# Patient Record
Sex: FEMALE | Race: BLACK OR AFRICAN AMERICAN | NOT HISPANIC OR LATINO | URBAN - METROPOLITAN AREA
[De-identification: names, ages, dates, MRNs, and addresses within clinical notes are randomized per-mention and may not be internally consistent; named-entity substitution may affect disease eponyms.]

---

## 2017-01-01 ENCOUNTER — INPATIENT (INPATIENT)
Facility: HOSPITAL | Age: 72
LOS: 2 days | Discharge: NURSING, REHAB OR LONG-TERM CARE | End: 2017-01-04
Attending: INTERNAL MEDICINE | Admitting: INTERNAL MEDICINE
Payer: MEDICARE

## 2017-01-01 VITALS
RESPIRATION RATE: 20 BRPM | HEART RATE: 88 BPM | DIASTOLIC BLOOD PRESSURE: 91 MMHG | TEMPERATURE: 99 F | SYSTOLIC BLOOD PRESSURE: 156 MMHG | OXYGEN SATURATION: 99 %

## 2017-01-01 DIAGNOSIS — E11.9 TYPE 2 DIABETES MELLITUS WITHOUT COMPLICATIONS: ICD-10-CM

## 2017-01-01 DIAGNOSIS — Z96.7 PRESENCE OF OTHER BONE AND TENDON IMPLANTS: Chronic | ICD-10-CM

## 2017-01-01 DIAGNOSIS — Z29.9 ENCOUNTER FOR PROPHYLACTIC MEASURES, UNSPECIFIED: ICD-10-CM

## 2017-01-01 DIAGNOSIS — S32.599A OTHER SPECIFIED FRACTURE OF UNSPECIFIED PUBIS, INITIAL ENCOUNTER FOR CLOSED FRACTURE: ICD-10-CM

## 2017-01-01 DIAGNOSIS — Z98.89 OTHER SPECIFIED POSTPROCEDURAL STATES: Chronic | ICD-10-CM

## 2017-01-01 DIAGNOSIS — Z98.51 TUBAL LIGATION STATUS: Chronic | ICD-10-CM

## 2017-01-01 DIAGNOSIS — I10 ESSENTIAL (PRIMARY) HYPERTENSION: ICD-10-CM

## 2017-01-01 DIAGNOSIS — Z87.81 PERSONAL HISTORY OF (HEALED) TRAUMATIC FRACTURE: Chronic | ICD-10-CM
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
## 2017-01-01 DIAGNOSIS — E78.5 HYPERLIPIDEMIA, UNSPECIFIED: ICD-10-CM

## 2017-01-01 DIAGNOSIS — N60.02 SOLITARY CYST OF LEFT BREAST: Chronic | ICD-10-CM

## 2017-01-01 LAB
ALBUMIN SERPL ELPH-MCNC: 3.8 G/DL — SIGNIFICANT CHANGE UP (ref 3.3–5)
ALP SERPL-CCNC: 93 U/L — SIGNIFICANT CHANGE UP (ref 40–120)
ALT FLD-CCNC: 18 U/L — SIGNIFICANT CHANGE UP (ref 4–33)
ANISOCYTOSIS BLD QL: SLIGHT — SIGNIFICANT CHANGE UP
AST SERPL-CCNC: 28 U/L — SIGNIFICANT CHANGE UP (ref 4–32)
BASOPHILS # BLD AUTO: 0.01 K/UL — SIGNIFICANT CHANGE UP (ref 0–0.2)
BASOPHILS NFR BLD AUTO: 0.2 % — SIGNIFICANT CHANGE UP (ref 0–2)
BILIRUB SERPL-MCNC: 0.4 MG/DL — SIGNIFICANT CHANGE UP (ref 0.2–1.2)
BUN SERPL-MCNC: 15 MG/DL — SIGNIFICANT CHANGE UP (ref 7–23)
CALCIUM SERPL-MCNC: 9.1 MG/DL — SIGNIFICANT CHANGE UP (ref 8.4–10.5)
CHLORIDE SERPL-SCNC: 109 MMOL/L — HIGH (ref 98–107)
CO2 SERPL-SCNC: 22 MMOL/L — SIGNIFICANT CHANGE UP (ref 22–31)
CREAT SERPL-MCNC: 0.88 MG/DL — SIGNIFICANT CHANGE UP (ref 0.5–1.3)
DACRYOCYTES BLD QL SMEAR: SLIGHT — SIGNIFICANT CHANGE UP
EOSINOPHIL # BLD AUTO: 0.07 K/UL — SIGNIFICANT CHANGE UP (ref 0–0.5)
EOSINOPHIL NFR BLD AUTO: 1.3 % — SIGNIFICANT CHANGE UP (ref 0–6)
GLUCOSE SERPL-MCNC: 134 MG/DL — HIGH (ref 70–99)
HCT VFR BLD CALC: 32.4 % — LOW (ref 34.5–45)
HGB BLD-MCNC: 10.5 G/DL — LOW (ref 11.5–15.5)
HYPOCHROMIA BLD QL: SLIGHT — SIGNIFICANT CHANGE UP
IMM GRANULOCYTES NFR BLD AUTO: 0 % — SIGNIFICANT CHANGE UP (ref 0–1.5)
LYMPHOCYTES # BLD AUTO: 1.65 K/UL — SIGNIFICANT CHANGE UP (ref 1–3.3)
LYMPHOCYTES # BLD AUTO: 31 % — SIGNIFICANT CHANGE UP (ref 13–44)
MANUAL SMEAR VERIFICATION: SIGNIFICANT CHANGE UP
MCHC RBC-ENTMCNC: 23.5 PG — LOW (ref 27–34)
MCHC RBC-ENTMCNC: 32.4 % — SIGNIFICANT CHANGE UP (ref 32–36)
MCV RBC AUTO: 72.6 FL — LOW (ref 80–100)
MICROCYTES BLD QL: SLIGHT — SIGNIFICANT CHANGE UP
MONOCYTES # BLD AUTO: 0.22 K/UL — SIGNIFICANT CHANGE UP (ref 0–0.9)
MONOCYTES NFR BLD AUTO: 4.1 % — SIGNIFICANT CHANGE UP (ref 2–14)
NEUTROPHILS # BLD AUTO: 3.38 K/UL — SIGNIFICANT CHANGE UP (ref 1.8–7.4)
NEUTROPHILS NFR BLD AUTO: 63.4 % — SIGNIFICANT CHANGE UP (ref 43–77)
PLATELET # BLD AUTO: 164 K/UL — SIGNIFICANT CHANGE UP (ref 150–400)
PLATELET COUNT - ESTIMATE: NORMAL — SIGNIFICANT CHANGE UP
PMV BLD: 8.5 FL — SIGNIFICANT CHANGE UP (ref 7–13)
POLYCHROMASIA BLD QL SMEAR: SLIGHT — SIGNIFICANT CHANGE UP
POTASSIUM SERPL-MCNC: 3.6 MMOL/L — SIGNIFICANT CHANGE UP (ref 3.5–5.3)
POTASSIUM SERPL-SCNC: 3.6 MMOL/L — SIGNIFICANT CHANGE UP (ref 3.5–5.3)
PROT SERPL-MCNC: 6.9 G/DL — SIGNIFICANT CHANGE UP (ref 6–8.3)
RBC # BLD: 4.46 M/UL — SIGNIFICANT CHANGE UP (ref 3.8–5.2)
RBC # FLD: 15.3 % — HIGH (ref 10.3–14.5)
SODIUM SERPL-SCNC: 144 MMOL/L — SIGNIFICANT CHANGE UP (ref 135–145)
WBC # BLD: 5.33 K/UL — SIGNIFICANT CHANGE UP (ref 3.8–10.5)
WBC # FLD AUTO: 5.33 K/UL — SIGNIFICANT CHANGE UP (ref 3.8–10.5)

## 2017-01-01 PROCEDURE — 99223 1ST HOSP IP/OBS HIGH 75: CPT

## 2017-01-01 PROCEDURE — 72192 CT PELVIS W/O DYE: CPT | Mod: 26

## 2017-01-01 PROCEDURE — 72020 X-RAY EXAM OF SPINE 1 VIEW: CPT | Mod: 26

## 2017-01-01 PROCEDURE — 71020: CPT | Mod: 26

## 2017-01-01 PROCEDURE — 72170 X-RAY EXAM OF PELVIS: CPT | Mod: 26

## 2017-01-01 RX ORDER — DEXTROSE 50 % IN WATER 50 %
12.5 SYRINGE (ML) INTRAVENOUS ONCE
Qty: 0 | Refills: 0 | Status: DISCONTINUED | OUTPATIENT
Start: 2017-01-01 | End: 2017-01-04

## 2017-01-01 RX ORDER — ATORVASTATIN CALCIUM 80 MG/1
80 TABLET, FILM COATED ORAL AT BEDTIME
Qty: 0 | Refills: 0 | Status: DISCONTINUED | OUTPATIENT
Start: 2017-01-01 | End: 2017-01-04

## 2017-01-01 RX ORDER — INSULIN GLARGINE 100 [IU]/ML
15 INJECTION, SOLUTION SUBCUTANEOUS AT BEDTIME
Qty: 0 | Refills: 0 | Status: DISCONTINUED | OUTPATIENT
Start: 2017-01-01 | End: 2017-01-04

## 2017-01-01 RX ORDER — INSULIN GLARGINE 100 [IU]/ML
20 INJECTION, SOLUTION SUBCUTANEOUS AT BEDTIME
Qty: 0 | Refills: 0 | Status: DISCONTINUED | OUTPATIENT
Start: 2017-01-01 | End: 2017-01-01

## 2017-01-01 RX ORDER — LIDOCAINE 4 G/100G
1 CREAM TOPICAL DAILY
Qty: 0 | Refills: 0 | Status: DISCONTINUED | OUTPATIENT
Start: 2017-01-01 | End: 2017-01-04

## 2017-01-01 RX ORDER — DEXTROSE 50 % IN WATER 50 %
25 SYRINGE (ML) INTRAVENOUS ONCE
Qty: 0 | Refills: 0 | Status: DISCONTINUED | OUTPATIENT
Start: 2017-01-01 | End: 2017-01-04

## 2017-01-01 RX ORDER — GLUCAGON INJECTION, SOLUTION 0.5 MG/.1ML
1 INJECTION, SOLUTION SUBCUTANEOUS ONCE
Qty: 0 | Refills: 0 | Status: DISCONTINUED | OUTPATIENT
Start: 2017-01-01 | End: 2017-01-04

## 2017-01-01 RX ORDER — ENOXAPARIN SODIUM 100 MG/ML
40 INJECTION SUBCUTANEOUS EVERY 24 HOURS
Qty: 0 | Refills: 0 | Status: DISCONTINUED | OUTPATIENT
Start: 2017-01-01 | End: 2017-01-04

## 2017-01-01 RX ORDER — ACETAMINOPHEN 500 MG
650 TABLET ORAL EVERY 6 HOURS
Qty: 0 | Refills: 0 | Status: DISCONTINUED | OUTPATIENT
Start: 2017-01-01 | End: 2017-01-04

## 2017-01-01 RX ORDER — ACETAMINOPHEN 500 MG
650 TABLET ORAL EVERY 6 HOURS
Qty: 0 | Refills: 0 | Status: DISCONTINUED | OUTPATIENT
Start: 2017-01-01 | End: 2017-01-01

## 2017-01-01 RX ORDER — INSULIN LISPRO 100/ML
VIAL (ML) SUBCUTANEOUS
Qty: 0 | Refills: 0 | Status: DISCONTINUED | OUTPATIENT
Start: 2017-01-01 | End: 2017-01-04

## 2017-01-01 RX ORDER — INSULIN LISPRO 100/ML
VIAL (ML) SUBCUTANEOUS AT BEDTIME
Qty: 0 | Refills: 0 | Status: DISCONTINUED | OUTPATIENT
Start: 2017-01-01 | End: 2017-01-04

## 2017-01-01 RX ORDER — VALSARTAN 80 MG/1
160 TABLET ORAL DAILY
Qty: 0 | Refills: 0 | Status: DISCONTINUED | OUTPATIENT
Start: 2017-01-01 | End: 2017-01-04

## 2017-01-01 RX ORDER — OXYCODONE HYDROCHLORIDE 5 MG/1
5 TABLET ORAL EVERY 4 HOURS
Qty: 0 | Refills: 0 | Status: DISCONTINUED | OUTPATIENT
Start: 2017-01-01 | End: 2017-01-04

## 2017-01-01 RX ORDER — METOPROLOL TARTRATE 50 MG
50 TABLET ORAL
Qty: 0 | Refills: 0 | Status: DISCONTINUED | OUTPATIENT
Start: 2017-01-01 | End: 2017-01-04

## 2017-01-01 RX ORDER — IBUPROFEN 200 MG
400 TABLET ORAL ONCE
Qty: 0 | Refills: 0 | Status: COMPLETED | OUTPATIENT
Start: 2017-01-01 | End: 2017-01-01

## 2017-01-01 RX ORDER — OXYCODONE HYDROCHLORIDE 5 MG/1
5 TABLET ORAL ONCE
Qty: 0 | Refills: 0 | Status: DISCONTINUED | OUTPATIENT
Start: 2017-01-01 | End: 2017-01-01

## 2017-01-01 RX ORDER — ACETAMINOPHEN 500 MG
650 TABLET ORAL EVERY 6 HOURS
Qty: 0 | Refills: 0 | Status: COMPLETED | OUTPATIENT
Start: 2017-01-01 | End: 2017-01-02

## 2017-01-01 RX ORDER — SODIUM CHLORIDE 9 MG/ML
1000 INJECTION, SOLUTION INTRAVENOUS
Qty: 0 | Refills: 0 | Status: DISCONTINUED | OUTPATIENT
Start: 2017-01-01 | End: 2017-01-04

## 2017-01-01 RX ORDER — INSULIN LISPRO 100/ML
5 VIAL (ML) SUBCUTANEOUS
Qty: 0 | Refills: 0 | Status: DISCONTINUED | OUTPATIENT
Start: 2017-01-01 | End: 2017-01-04

## 2017-01-01 RX ORDER — HYDROMORPHONE HYDROCHLORIDE 2 MG/ML
0.5 INJECTION INTRAMUSCULAR; INTRAVENOUS; SUBCUTANEOUS ONCE
Qty: 0 | Refills: 0 | Status: DISCONTINUED | OUTPATIENT
Start: 2017-01-01 | End: 2017-01-01

## 2017-01-01 RX ORDER — DEXTROSE 50 % IN WATER 50 %
1 SYRINGE (ML) INTRAVENOUS ONCE
Qty: 0 | Refills: 0 | Status: DISCONTINUED | OUTPATIENT
Start: 2017-01-01 | End: 2017-01-04

## 2017-01-01 RX ADMIN — VALSARTAN 160 MILLIGRAM(S): 80 TABLET ORAL at 14:15

## 2017-01-01 RX ADMIN — Medication 5 UNIT(S): at 17:48

## 2017-01-01 RX ADMIN — ATORVASTATIN CALCIUM 80 MILLIGRAM(S): 80 TABLET, FILM COATED ORAL at 22:44

## 2017-01-01 RX ADMIN — OXYCODONE HYDROCHLORIDE 5 MILLIGRAM(S): 5 TABLET ORAL at 22:55

## 2017-01-01 RX ADMIN — LIDOCAINE 1 PATCH: 4 CREAM TOPICAL at 17:51

## 2017-01-01 RX ADMIN — OXYCODONE HYDROCHLORIDE 5 MILLIGRAM(S): 5 TABLET ORAL at 21:51

## 2017-01-01 RX ADMIN — OXYCODONE HYDROCHLORIDE 5 MILLIGRAM(S): 5 TABLET ORAL at 10:32

## 2017-01-01 RX ADMIN — OXYCODONE HYDROCHLORIDE 5 MILLIGRAM(S): 5 TABLET ORAL at 14:50

## 2017-01-01 RX ADMIN — Medication 650 MILLIGRAM(S): at 18:21

## 2017-01-01 RX ADMIN — Medication 650 MILLIGRAM(S): at 17:51

## 2017-01-01 RX ADMIN — ENOXAPARIN SODIUM 40 MILLIGRAM(S): 100 INJECTION SUBCUTANEOUS at 14:15

## 2017-01-01 RX ADMIN — HYDROMORPHONE HYDROCHLORIDE 0.5 MILLIGRAM(S): 2 INJECTION INTRAMUSCULAR; INTRAVENOUS; SUBCUTANEOUS at 17:47

## 2017-01-01 RX ADMIN — OXYCODONE HYDROCHLORIDE 5 MILLIGRAM(S): 5 TABLET ORAL at 09:57

## 2017-01-01 RX ADMIN — Medication 400 MILLIGRAM(S): at 03:31

## 2017-01-01 RX ADMIN — OXYCODONE HYDROCHLORIDE 5 MILLIGRAM(S): 5 TABLET ORAL at 14:16

## 2017-01-01 RX ADMIN — HYDROMORPHONE HYDROCHLORIDE 0.5 MILLIGRAM(S): 2 INJECTION INTRAMUSCULAR; INTRAVENOUS; SUBCUTANEOUS at 18:07

## 2017-01-01 RX ADMIN — Medication 400 MILLIGRAM(S): at 04:00

## 2017-01-01 RX ADMIN — INSULIN GLARGINE 15 UNIT(S): 100 INJECTION, SOLUTION SUBCUTANEOUS at 22:43

## 2017-01-01 RX ADMIN — Medication 50 MILLIGRAM(S): at 17:51

## 2017-01-01 NOTE — ED PROVIDER NOTE - PMH
Anemia    Diabetes mellitus type II    HTN (hypertension), benign    Hypercholesterolemia    Radius fracture  left

## 2017-01-01 NOTE — ED PROVIDER NOTE - ATTENDING CONTRIBUTION TO CARE
Dr. Thomas:  I have personally performed a face to face bedside history and physical examination of this patient. I have discussed the history, examination, review of systems, assessment and plan of management with the resident. I have reviewed the electronic medical record and amended it to reflect my history, review of systems, physical exam, assessment and plan. Dr. Thomas:  I have personally performed a face to face bedside history and physical examination of this patient. I have discussed the history, examination, review of systems, assessment and plan of management with the resident. I have reviewed the electronic medical record and amended it to reflect my history, review of systems, physical exam, assessment and plan.    71F h/o HTN, HLD, DM, presents s/p mechanical fall with bilateral shoulder and right groin pain.  Pt states she was attempting to get ready to go to [employer] and slipped and fell in the bathroom.  Denies head trauma, syncope, other complaints.    Exam:  - NAD  - RRR  - CTAB  - +mild TTP thoracic spine  - pain with ranging lower extremities at the hip, stable pelvis    A/P  - mechanical fall  - CXR, pelvic XR, spine XR  - pain control  - reassess

## 2017-01-01 NOTE — ED ADULT NURSE NOTE - OBJECTIVE STATEMENT
Pt received to rm 20 aaox4 reports she can't walk d/t pain c/o mechanical trip and fall. Pt denies head trauma or LOC.  Pt c/o right groin pain and bilateral shoulder pain.  Pt denies numbness tingling.  Pt exhibits full ROM and sensory in all 4 extremities.  Pt in NAD awaiting further evaluation.

## 2017-01-01 NOTE — H&P ADULT. - PROBLEM SELECTOR PLAN 1
-non-operative per Ortho, WBAT  -f/u official Ortho note  -PT eval  -pain control w/ Tylenol, oxycodone PRN

## 2017-01-01 NOTE — H&P ADULT. - HISTORY OF PRESENT ILLNESS
70 yo F w/ anemia, HTN, HLD, IDDM presents s/p fall. Patient was getting dressed for [employer] this morning when she saw some water on the floor. She went to the bathroom to get a towel and thinks she slipped on the water. Patient fell and landed on her L hip. She denies hitting her head and did not have any LOC. Shortly thereafter, patient began experiencing R groin pain and difficulty ambulating and presented to the ED.    In the ED, patient was afebrile, VSS.  Labs unremarkable.   CT pelvis revealed an acute non-displaced R superior pubic ramus fracture.   Ortho was consulted and verbalized recs of WBAT, no surgical intervention. Official consult pending.

## 2017-01-01 NOTE — H&P ADULT. - ASSESSMENT
72 yo F w/ anemia, HTN, HLD, IDDM presents s/p fall with R groin pain found to have acute nondisplaced R superior pubic ramus fracture

## 2017-01-01 NOTE — H&P ADULT. - PROBLEM SELECTOR PLAN 2
-pt takes Lantus 40 qhs at home, but serum glucose in low 100's, will order 20 units Lantus tonight, monitor FS throughout the day  -ISS  -check Hga1c in AM

## 2017-01-01 NOTE — ED ADULT NURSE NOTE - CHIEF COMPLAINT QUOTE
Pt. brought to [facility] ED via EMS. Pt. tripped and fell while going to the bathroom. Denies head trauma. c/o right groin pain and bilateral shoulder pain. NAD noted.

## 2017-01-01 NOTE — ED ADULT TRIAGE NOTE - CHIEF COMPLAINT QUOTE
Pt. brought to [facility] ED via EMS. Pt. tripped and fell while going to the bathroom. Denies head trauma. c/o right groin pain and bilateral shoulder pain. NAD noted.

## 2017-01-01 NOTE — ED PROVIDER NOTE - PSH
Breast cyst, left  Excision of left breast cyst; 1964  H/O tubal ligation  1983  History of bunionectomy of left great toe    History of cataract extraction  bilateral;  (Left 02/013 - right 03/3013)  History of wrist fracture  ORIF left wrist ; 2013  S/P ORIF (open reduction internal fixation) fracture  left ankle; ~ 5-6 years ago

## 2017-01-01 NOTE — ED PROVIDER NOTE - OBJECTIVE STATEMENT
70yo f pmh htn, hld, dm, presents s/p fall. Pt tripped while getting dressed for [employer], pt now complaining for right groin pain, difficulty ambulating. Pt has some bilateral shoulder pain as well. Pt did not hit head, no LOC. Not on AC.

## 2017-01-01 NOTE — PROVIDER CONTACT NOTE (OTHER) - ACTION/TREATMENT ORDERED:
Dr. العلي notified and ordered 15U Lantus to be administered, hold Humalog at this time. Will continue to monitor and assess.

## 2017-01-02 ENCOUNTER — TRANSCRIPTION ENCOUNTER (OUTPATIENT)
Age: 72
End: 2017-01-02

## 2017-01-02 LAB
ALBUMIN SERPL ELPH-MCNC: 3.5 G/DL — SIGNIFICANT CHANGE UP (ref 3.3–5)
ALP SERPL-CCNC: 88 U/L — SIGNIFICANT CHANGE UP (ref 40–120)
ALT FLD-CCNC: 16 U/L — SIGNIFICANT CHANGE UP (ref 4–33)
AST SERPL-CCNC: 23 U/L — SIGNIFICANT CHANGE UP (ref 4–32)
BASOPHILS # BLD AUTO: 0.02 K/UL — SIGNIFICANT CHANGE UP (ref 0–0.2)
BASOPHILS NFR BLD AUTO: 0.4 % — SIGNIFICANT CHANGE UP (ref 0–2)
BILIRUB SERPL-MCNC: 0.4 MG/DL — SIGNIFICANT CHANGE UP (ref 0.2–1.2)
BUN SERPL-MCNC: 13 MG/DL — SIGNIFICANT CHANGE UP (ref 7–23)
BUN SERPL-MCNC: 13 MG/DL — SIGNIFICANT CHANGE UP (ref 7–23)
CALCIUM SERPL-MCNC: 8.8 MG/DL — SIGNIFICANT CHANGE UP (ref 8.4–10.5)
CALCIUM SERPL-MCNC: 8.8 MG/DL — SIGNIFICANT CHANGE UP (ref 8.4–10.5)
CHLORIDE SERPL-SCNC: 104 MMOL/L — SIGNIFICANT CHANGE UP (ref 98–107)
CHLORIDE SERPL-SCNC: 104 MMOL/L — SIGNIFICANT CHANGE UP (ref 98–107)
CO2 SERPL-SCNC: 21 MMOL/L — LOW (ref 22–31)
CO2 SERPL-SCNC: 21 MMOL/L — LOW (ref 22–31)
CREAT SERPL-MCNC: 0.92 MG/DL — SIGNIFICANT CHANGE UP (ref 0.5–1.3)
CREAT SERPL-MCNC: 0.92 MG/DL — SIGNIFICANT CHANGE UP (ref 0.5–1.3)
EOSINOPHIL # BLD AUTO: 0.18 K/UL — SIGNIFICANT CHANGE UP (ref 0–0.5)
EOSINOPHIL NFR BLD AUTO: 3.8 % — SIGNIFICANT CHANGE UP (ref 0–6)
GLUCOSE SERPL-MCNC: 150 MG/DL — HIGH (ref 70–99)
GLUCOSE SERPL-MCNC: 150 MG/DL — HIGH (ref 70–99)
HBA1C BLD-MCNC: 7 % — HIGH (ref 4–5.6)
HCT VFR BLD CALC: 33.7 % — LOW (ref 34.5–45)
HGB BLD-MCNC: 10.6 G/DL — LOW (ref 11.5–15.5)
IMM GRANULOCYTES NFR BLD AUTO: 0.2 % — SIGNIFICANT CHANGE UP (ref 0–1.5)
LYMPHOCYTES # BLD AUTO: 1.39 K/UL — SIGNIFICANT CHANGE UP (ref 1–3.3)
LYMPHOCYTES # BLD AUTO: 29 % — SIGNIFICANT CHANGE UP (ref 13–44)
MAGNESIUM SERPL-MCNC: 1.8 MG/DL — SIGNIFICANT CHANGE UP (ref 1.6–2.6)
MCHC RBC-ENTMCNC: 23.5 PG — LOW (ref 27–34)
MCHC RBC-ENTMCNC: 31.5 % — LOW (ref 32–36)
MCV RBC AUTO: 74.7 FL — LOW (ref 80–100)
MONOCYTES # BLD AUTO: 0.15 K/UL — SIGNIFICANT CHANGE UP (ref 0–0.9)
MONOCYTES NFR BLD AUTO: 3.1 % — SIGNIFICANT CHANGE UP (ref 2–14)
NEUTROPHILS # BLD AUTO: 3.05 K/UL — SIGNIFICANT CHANGE UP (ref 1.8–7.4)
NEUTROPHILS NFR BLD AUTO: 63.5 % — SIGNIFICANT CHANGE UP (ref 43–77)
PHOSPHATE SERPL-MCNC: 2.3 MG/DL — LOW (ref 2.5–4.5)
PLATELET # BLD AUTO: 159 K/UL — SIGNIFICANT CHANGE UP (ref 150–400)
PMV BLD: 9 FL — SIGNIFICANT CHANGE UP (ref 7–13)
POTASSIUM SERPL-MCNC: 3.7 MMOL/L — SIGNIFICANT CHANGE UP (ref 3.5–5.3)
POTASSIUM SERPL-MCNC: 3.7 MMOL/L — SIGNIFICANT CHANGE UP (ref 3.5–5.3)
POTASSIUM SERPL-SCNC: 3.7 MMOL/L — SIGNIFICANT CHANGE UP (ref 3.5–5.3)
POTASSIUM SERPL-SCNC: 3.7 MMOL/L — SIGNIFICANT CHANGE UP (ref 3.5–5.3)
PROT SERPL-MCNC: 6.5 G/DL — SIGNIFICANT CHANGE UP (ref 6–8.3)
RBC # BLD: 4.51 M/UL — SIGNIFICANT CHANGE UP (ref 3.8–5.2)
RBC # FLD: 15.3 % — HIGH (ref 10.3–14.5)
SODIUM SERPL-SCNC: 137 MMOL/L — SIGNIFICANT CHANGE UP (ref 135–145)
SODIUM SERPL-SCNC: 137 MMOL/L — SIGNIFICANT CHANGE UP (ref 135–145)
WBC # BLD: 4.8 K/UL — SIGNIFICANT CHANGE UP (ref 3.8–10.5)
WBC # FLD AUTO: 4.8 K/UL — SIGNIFICANT CHANGE UP (ref 3.8–10.5)

## 2017-01-02 RX ORDER — SENNA PLUS 8.6 MG/1
2 TABLET ORAL AT BEDTIME
Qty: 0 | Refills: 0 | Status: DISCONTINUED | OUTPATIENT
Start: 2017-01-02 | End: 2017-01-04

## 2017-01-02 RX ORDER — CALCIUM CARBONATE 500(1250)
2 TABLET ORAL ONCE
Qty: 0 | Refills: 0 | Status: COMPLETED | OUTPATIENT
Start: 2017-01-02 | End: 2017-01-02

## 2017-01-02 RX ORDER — DOCUSATE SODIUM 100 MG
100 CAPSULE ORAL THREE TIMES A DAY
Qty: 0 | Refills: 0 | Status: DISCONTINUED | OUTPATIENT
Start: 2017-01-02 | End: 2017-01-04

## 2017-01-02 RX ADMIN — OXYCODONE HYDROCHLORIDE 5 MILLIGRAM(S): 5 TABLET ORAL at 19:26

## 2017-01-02 RX ADMIN — Medication 1: at 12:30

## 2017-01-02 RX ADMIN — OXYCODONE HYDROCHLORIDE 5 MILLIGRAM(S): 5 TABLET ORAL at 07:40

## 2017-01-02 RX ADMIN — OXYCODONE HYDROCHLORIDE 5 MILLIGRAM(S): 5 TABLET ORAL at 02:09

## 2017-01-02 RX ADMIN — Medication 100 MILLIGRAM(S): at 21:58

## 2017-01-02 RX ADMIN — INSULIN GLARGINE 15 UNIT(S): 100 INJECTION, SOLUTION SUBCUTANEOUS at 22:03

## 2017-01-02 RX ADMIN — Medication 50 MILLIGRAM(S): at 18:33

## 2017-01-02 RX ADMIN — ENOXAPARIN SODIUM 40 MILLIGRAM(S): 100 INJECTION SUBCUTANEOUS at 13:35

## 2017-01-02 RX ADMIN — OXYCODONE HYDROCHLORIDE 5 MILLIGRAM(S): 5 TABLET ORAL at 22:35

## 2017-01-02 RX ADMIN — LIDOCAINE 1 PATCH: 4 CREAM TOPICAL at 13:34

## 2017-01-02 RX ADMIN — OXYCODONE HYDROCHLORIDE 5 MILLIGRAM(S): 5 TABLET ORAL at 18:33

## 2017-01-02 RX ADMIN — LIDOCAINE 1 PATCH: 4 CREAM TOPICAL at 05:40

## 2017-01-02 RX ADMIN — Medication 650 MILLIGRAM(S): at 06:40

## 2017-01-02 RX ADMIN — Medication 2 TABLET(S): at 21:58

## 2017-01-02 RX ADMIN — OXYCODONE HYDROCHLORIDE 5 MILLIGRAM(S): 5 TABLET ORAL at 03:10

## 2017-01-02 RX ADMIN — Medication 650 MILLIGRAM(S): at 07:40

## 2017-01-02 RX ADMIN — SENNA PLUS 2 TABLET(S): 8.6 TABLET ORAL at 21:58

## 2017-01-02 RX ADMIN — OXYCODONE HYDROCHLORIDE 5 MILLIGRAM(S): 5 TABLET ORAL at 06:40

## 2017-01-02 RX ADMIN — VALSARTAN 160 MILLIGRAM(S): 80 TABLET ORAL at 06:40

## 2017-01-02 RX ADMIN — OXYCODONE HYDROCHLORIDE 5 MILLIGRAM(S): 5 TABLET ORAL at 14:30

## 2017-01-02 RX ADMIN — Medication 650 MILLIGRAM(S): at 13:30

## 2017-01-02 RX ADMIN — ATORVASTATIN CALCIUM 80 MILLIGRAM(S): 80 TABLET, FILM COATED ORAL at 21:58

## 2017-01-02 RX ADMIN — Medication 650 MILLIGRAM(S): at 12:30

## 2017-01-02 RX ADMIN — Medication 5 UNIT(S): at 17:33

## 2017-01-02 RX ADMIN — Medication 5 UNIT(S): at 12:30

## 2017-01-02 RX ADMIN — OXYCODONE HYDROCHLORIDE 5 MILLIGRAM(S): 5 TABLET ORAL at 13:34

## 2017-01-02 RX ADMIN — Medication 50 MILLIGRAM(S): at 06:40

## 2017-01-02 RX ADMIN — Medication 5 UNIT(S): at 08:55

## 2017-01-02 RX ADMIN — OXYCODONE HYDROCHLORIDE 5 MILLIGRAM(S): 5 TABLET ORAL at 23:35

## 2017-01-02 RX ADMIN — Medication 100 MILLIGRAM(S): at 13:34

## 2017-01-02 NOTE — DISCHARGE NOTE ADULT - HOSPITAL COURSE
72 yo F w/ anemia, HTN, HLD, IDDM presents s/p fall, found to have, acute non-displaced R superior pubic ramus fracture on CT. Ortho consulted, no surgical intervention, WBAT, pain control. PT eval-    Diabetes mellitus.  [id number]  -ISS  -Lantus 15 units  -Endo consulted Dr. Saleem    HTN (hypertension).   -c/w metoprolol and valsartan.     HLD (hyperlipidemia).  -c/w statin.     Prophylactic measure  -Lovenox. 70 yo F w/ anemia, HTN, HLD, IDDM presents s/p fall, found to have, acute non-displaced R superior pubic ramus fracture on CT. Ortho consulted, no surgical intervention, WBAT, pain control. PT eval-    Diabetes mellitus.  [id number]-6.0  -ISS  -Lantus 15 units  -Endo consulted Dr. Saleem    HTN (hypertension).   -c/w metoprolol and valsartan.     HLD (hyperlipidemia).  -c/w statin.     Prophylactic measure  -Lovenox.     Pt medically stable for discharge to rehab 72 yo F w/ anemia, HTN, HLD, IDDM presents s/p fall, found to have, acute non-displaced R superior pubic ramus fracture on CT. Ortho consulted, no surgical intervention, WBAT, pain control. PT eval- rehab recommened    Diabetes mellitus.  - Zwa2q-1.0  - ISS  - Basal/bolus  - Endo consulted Dr. Saleem    HTN    -c/w metoprolol and valsartan.     HLD   - c/w statin.     Prophylactic measure  - Lovenox.     Pt medically stable for discharge to rehab

## 2017-01-02 NOTE — DISCHARGE NOTE ADULT - CARE PLAN
Principal Discharge DX:	Pubic ramus fracture  Goal:	tolerable pain level  Instructions for follow-up, activity and diet:	follow up  Secondary Diagnosis:	HTN (hypertension)  Goal:	SBP<140  Instructions for follow-up, activity and diet:	continue Valsartan, Metoprolol  Secondary Diagnosis:	Diabetes mellitus  Goal:	Blood sugar 110-180  Instructions for follow-up, activity and diet:	continue Insulin  Secondary Diagnosis:	HLD (hyperlipidemia)  Instructions for follow-up, activity and diet:	continue Crestor Principal Discharge DX:	Pubic ramus fracture  Goal:	tolerable pain level  Instructions for follow-up, activity and diet:	Follow up with orthopedics within 1 to 2 wks of discharge  Secondary Diagnosis:	HTN (hypertension)  Goal:	SBP<140  Instructions for follow-up, activity and diet:	continue Valsartan, Metoprolol  Secondary Diagnosis:	Diabetes mellitus  Goal:	Blood sugar 110-180  Instructions for follow-up, activity and diet:	continue Insulin  Secondary Diagnosis:	HLD (hyperlipidemia)  Instructions for follow-up, activity and diet:	continue Crestor

## 2017-01-02 NOTE — PHYSICAL THERAPY INITIAL EVALUATION ADULT - NS ASR WT BEARING DETAIL RLE
"as per verbal consult from Ortho - WBAT - awaiting official c/s" as per PT Orders./weight-bearing as tolerated

## 2017-01-02 NOTE — DISCHARGE NOTE ADULT - CARE PROVIDERS DIRECT ADDRESSES
,DirectAddress_Unknown,DirectAddress_Unknown ,DirectAddress_Unknown,DirectAddress_Unknown,DirectAddress_Unknown

## 2017-01-02 NOTE — DISCHARGE NOTE ADULT - PLAN OF CARE
tolerable pain level follow up SBP<140 Blood sugar 110-180 continue Valsartan, Metoprolol continue Crestor continue Insulin Follow up with orthopedics within 1 to 2 wks of discharge

## 2017-01-02 NOTE — DISCHARGE NOTE ADULT - MEDICATION SUMMARY - MEDICATIONS TO TAKE
I will START or STAY ON the medications listed below when I get home from the hospital:    acetaminophen 325 mg oral tablet  -- 2 tab(s) by mouth every 6 hours, As needed, For Temp greater than 38 C (100.4 F)  -- Indication: For Fever     oxyCODONE 5 mg oral tablet  -- 1 tab(s) by mouth every 4 hours, As needed, Severe Pain (7 - 10)  -- Indication: For Pain     valsartan 160 mg oral tablet  -- 1 tab(s) by mouth once a day  -- Indication: For HTN (hypertension)    insulin glargine  -- 15 unit(s) subcutaneous once a day (at bedtime)  -- Indication: For DM    insulin lispro 100 units/mL subcutaneous solution  -- 5 unit(s) subcutaneous 3 times a day (before meals)  -- Indication: For DM    Crestor 20 mg oral tablet  -- 1 tab(s) by mouth once a day (at bedtime)  -- Indication: For HLD (hyperlipidemia)    metoprolol tartrate 50 mg oral tablet  -- 1 tab(s) by mouth 2 times a day  -- Indication: For HTN (hypertension)    lidocaine 5% topical film  -- Apply on skin to affected area once a day  -- Indication: For PAIN    senna oral tablet  -- 2 tab(s) by mouth once a day (at bedtime)  -- Indication: For cONSTIAPTION     docusate sodium 100 mg oral capsule  -- 1 cap(s) by mouth 3 times a day  -- Indication: For cONSTIAPTION     polyethylene glycol 3350 oral powder for reconstitution  -- 17 gram(s) by mouth 2 times a day  -- Indication: For cONSTIAPTION

## 2017-01-02 NOTE — DISCHARGE NOTE ADULT - PROVIDER TOKENS
FREE:[LAST:[Dr. Majano (PCP)],PHONE:[(   )    -],FAX:[(   )    -]] FREE:[LAST:[Dr. Majano (PCP)],PHONE:[(   )    -],FAX:[(   )    -]],FREE:[LAST:[[facility]],PHONE:[phone]],FAX:[(   )    -]]

## 2017-01-02 NOTE — DISCHARGE NOTE ADULT - CARE PROVIDER_API CALL
Dr. Majano (PCP),   Phone: (   )    -  Fax: (   )    - Dr. Majano (PCP),   Phone: (   )    -  Fax: (   )    -    [facility],   Phone: [phone]  Fax: (   )    -

## 2017-01-02 NOTE — DISCHARGE NOTE ADULT - PATIENT PORTAL LINK FT
“You can access the Follow[facility] Patient Portal, offered by [facility], by registering with the following website: [email or web address]”

## 2017-01-03 LAB
BUN SERPL-MCNC: 13 MG/DL — SIGNIFICANT CHANGE UP (ref 7–23)
CALCIUM SERPL-MCNC: 8.6 MG/DL — SIGNIFICANT CHANGE UP (ref 8.4–10.5)
CHLORIDE SERPL-SCNC: 103 MMOL/L — SIGNIFICANT CHANGE UP (ref 98–107)
CO2 SERPL-SCNC: 22 MMOL/L — SIGNIFICANT CHANGE UP (ref 22–31)
CREAT SERPL-MCNC: 0.88 MG/DL — SIGNIFICANT CHANGE UP (ref 0.5–1.3)
GLUCOSE SERPL-MCNC: 123 MG/DL — HIGH (ref 70–99)
HBA1C BLD-MCNC: 7.3 % — HIGH (ref 4–5.6)
HCT VFR BLD CALC: 33.5 % — LOW (ref 34.5–45)
HGB BLD-MCNC: 11 G/DL — LOW (ref 11.5–15.5)
MCHC RBC-ENTMCNC: 23.7 PG — LOW (ref 27–34)
MCHC RBC-ENTMCNC: 32.8 % — SIGNIFICANT CHANGE UP (ref 32–36)
MCV RBC AUTO: 72 FL — LOW (ref 80–100)
PLATELET # BLD AUTO: 150 K/UL — SIGNIFICANT CHANGE UP (ref 150–400)
PMV BLD: 8.6 FL — SIGNIFICANT CHANGE UP (ref 7–13)
POTASSIUM SERPL-MCNC: 3.7 MMOL/L — SIGNIFICANT CHANGE UP (ref 3.5–5.3)
POTASSIUM SERPL-SCNC: 3.7 MMOL/L — SIGNIFICANT CHANGE UP (ref 3.5–5.3)
RBC # BLD: 4.65 M/UL — SIGNIFICANT CHANGE UP (ref 3.8–5.2)
RBC # FLD: 15.4 % — HIGH (ref 10.3–14.5)
SODIUM SERPL-SCNC: 140 MMOL/L — SIGNIFICANT CHANGE UP (ref 135–145)
T4 FREE SERPL-MCNC: 1.04 NG/DL — SIGNIFICANT CHANGE UP (ref 0.9–1.8)
TSH SERPL-MCNC: 0.84 UIU/ML — SIGNIFICANT CHANGE UP (ref 0.27–4.2)
WBC # BLD: 4.24 K/UL — SIGNIFICANT CHANGE UP (ref 3.8–10.5)
WBC # FLD AUTO: 4.24 K/UL — SIGNIFICANT CHANGE UP (ref 3.8–10.5)

## 2017-01-03 RX ORDER — POLYETHYLENE GLYCOL 3350 17 G/17G
17 POWDER, FOR SOLUTION ORAL
Qty: 0 | Refills: 0 | Status: DISCONTINUED | OUTPATIENT
Start: 2017-01-03 | End: 2017-01-04

## 2017-01-03 RX ADMIN — POLYETHYLENE GLYCOL 3350 17 GRAM(S): 17 POWDER, FOR SOLUTION ORAL at 17:07

## 2017-01-03 RX ADMIN — LIDOCAINE 1 PATCH: 4 CREAM TOPICAL at 13:29

## 2017-01-03 RX ADMIN — OXYCODONE HYDROCHLORIDE 5 MILLIGRAM(S): 5 TABLET ORAL at 15:01

## 2017-01-03 RX ADMIN — Medication 5 UNIT(S): at 08:34

## 2017-01-03 RX ADMIN — LIDOCAINE 1 PATCH: 4 CREAM TOPICAL at 02:13

## 2017-01-03 RX ADMIN — OXYCODONE HYDROCHLORIDE 5 MILLIGRAM(S): 5 TABLET ORAL at 03:00

## 2017-01-03 RX ADMIN — OXYCODONE HYDROCHLORIDE 5 MILLIGRAM(S): 5 TABLET ORAL at 15:46

## 2017-01-03 RX ADMIN — Medication 5 UNIT(S): at 13:28

## 2017-01-03 RX ADMIN — OXYCODONE HYDROCHLORIDE 5 MILLIGRAM(S): 5 TABLET ORAL at 03:50

## 2017-01-03 RX ADMIN — OXYCODONE HYDROCHLORIDE 5 MILLIGRAM(S): 5 TABLET ORAL at 08:44

## 2017-01-03 RX ADMIN — Medication 5 UNIT(S): at 17:06

## 2017-01-03 RX ADMIN — VALSARTAN 160 MILLIGRAM(S): 80 TABLET ORAL at 06:54

## 2017-01-03 RX ADMIN — SENNA PLUS 2 TABLET(S): 8.6 TABLET ORAL at 21:16

## 2017-01-03 RX ADMIN — Medication 1: at 17:06

## 2017-01-03 RX ADMIN — ATORVASTATIN CALCIUM 80 MILLIGRAM(S): 80 TABLET, FILM COATED ORAL at 21:16

## 2017-01-03 RX ADMIN — OXYCODONE HYDROCHLORIDE 5 MILLIGRAM(S): 5 TABLET ORAL at 21:55

## 2017-01-03 RX ADMIN — ENOXAPARIN SODIUM 40 MILLIGRAM(S): 100 INJECTION SUBCUTANEOUS at 13:28

## 2017-01-03 RX ADMIN — OXYCODONE HYDROCHLORIDE 5 MILLIGRAM(S): 5 TABLET ORAL at 09:20

## 2017-01-03 RX ADMIN — Medication 100 MILLIGRAM(S): at 06:54

## 2017-01-03 RX ADMIN — LIDOCAINE 1 PATCH: 4 CREAM TOPICAL at 22:56

## 2017-01-03 RX ADMIN — Medication 100 MILLIGRAM(S): at 13:29

## 2017-01-03 RX ADMIN — INSULIN GLARGINE 15 UNIT(S): 100 INJECTION, SOLUTION SUBCUTANEOUS at 22:54

## 2017-01-03 RX ADMIN — Medication 50 MILLIGRAM(S): at 06:54

## 2017-01-03 RX ADMIN — Medication 50 MILLIGRAM(S): at 17:07

## 2017-01-03 RX ADMIN — Medication 100 MILLIGRAM(S): at 21:16

## 2017-01-03 RX ADMIN — OXYCODONE HYDROCHLORIDE 5 MILLIGRAM(S): 5 TABLET ORAL at 21:16

## 2017-01-04 VITALS — HEART RATE: 76 BPM | SYSTOLIC BLOOD PRESSURE: 154 MMHG | DIASTOLIC BLOOD PRESSURE: 76 MMHG

## 2017-01-04 LAB
BUN SERPL-MCNC: 13 MG/DL — SIGNIFICANT CHANGE UP (ref 7–23)
CALCIUM SERPL-MCNC: 9 MG/DL — SIGNIFICANT CHANGE UP (ref 8.4–10.5)
CHLORIDE SERPL-SCNC: 102 MMOL/L — SIGNIFICANT CHANGE UP (ref 98–107)
CO2 SERPL-SCNC: 26 MMOL/L — SIGNIFICANT CHANGE UP (ref 22–31)
CREAT SERPL-MCNC: 0.9 MG/DL — SIGNIFICANT CHANGE UP (ref 0.5–1.3)
GLUCOSE SERPL-MCNC: 139 MG/DL — HIGH (ref 70–99)
HCT VFR BLD CALC: 33.7 % — LOW (ref 34.5–45)
HGB BLD-MCNC: 10.9 G/DL — LOW (ref 11.5–15.5)
MCHC RBC-ENTMCNC: 23.6 PG — LOW (ref 27–34)
MCHC RBC-ENTMCNC: 32.3 % — SIGNIFICANT CHANGE UP (ref 32–36)
MCV RBC AUTO: 72.9 FL — LOW (ref 80–100)
PLATELET # BLD AUTO: 169 K/UL — SIGNIFICANT CHANGE UP (ref 150–400)
PMV BLD: 9 FL — SIGNIFICANT CHANGE UP (ref 7–13)
POTASSIUM SERPL-MCNC: 4.4 MMOL/L — SIGNIFICANT CHANGE UP (ref 3.5–5.3)
POTASSIUM SERPL-SCNC: 4.4 MMOL/L — SIGNIFICANT CHANGE UP (ref 3.5–5.3)
RBC # BLD: 4.62 M/UL — SIGNIFICANT CHANGE UP (ref 3.8–5.2)
RBC # FLD: 15.3 % — HIGH (ref 10.3–14.5)
SODIUM SERPL-SCNC: 141 MMOL/L — SIGNIFICANT CHANGE UP (ref 135–145)
WBC # BLD: 4.26 K/UL — SIGNIFICANT CHANGE UP (ref 3.8–10.5)
WBC # FLD AUTO: 4.26 K/UL — SIGNIFICANT CHANGE UP (ref 3.8–10.5)

## 2017-01-04 RX ORDER — INSULIN GLARGINE 100 [IU]/ML
30 INJECTION, SOLUTION SUBCUTANEOUS
Qty: 0 | Refills: 0 | COMMUNITY
Start: 2017-01-04

## 2017-01-04 RX ORDER — INSULIN GLARGINE 100 [IU]/ML
35 INJECTION, SOLUTION SUBCUTANEOUS
Qty: 0 | Refills: 0 | COMMUNITY
Start: 2017-01-04

## 2017-01-04 RX ORDER — INSULIN GLARGINE 100 [IU]/ML
40 INJECTION, SOLUTION SUBCUTANEOUS
Qty: 0 | Refills: 0 | COMMUNITY

## 2017-01-04 RX ORDER — INSULIN LISPRO 100/ML
5 VIAL (ML) SUBCUTANEOUS
Qty: 0 | Refills: 0 | COMMUNITY
Start: 2017-01-04

## 2017-01-04 RX ORDER — POLYETHYLENE GLYCOL 3350 17 G/17G
17 POWDER, FOR SOLUTION ORAL
Qty: 0 | Refills: 0 | COMMUNITY
Start: 2017-01-04

## 2017-01-04 RX ORDER — DOCUSATE SODIUM 100 MG
1 CAPSULE ORAL
Qty: 0 | Refills: 0 | COMMUNITY
Start: 2017-01-04

## 2017-01-04 RX ORDER — LIDOCAINE 4 G/100G
1 CREAM TOPICAL
Qty: 0 | Refills: 0 | COMMUNITY
Start: 2017-01-04

## 2017-01-04 RX ORDER — SENNA PLUS 8.6 MG/1
2 TABLET ORAL
Qty: 0 | Refills: 0 | COMMUNITY
Start: 2017-01-04

## 2017-01-04 RX ORDER — OXYCODONE HYDROCHLORIDE 5 MG/1
1 TABLET ORAL
Qty: 0 | Refills: 0 | COMMUNITY
Start: 2017-01-04

## 2017-01-04 RX ORDER — ACETAMINOPHEN 500 MG
2 TABLET ORAL
Qty: 0 | Refills: 0 | COMMUNITY
Start: 2017-01-04

## 2017-01-04 RX ORDER — INSULIN GLARGINE 100 [IU]/ML
15 INJECTION, SOLUTION SUBCUTANEOUS
Qty: 0 | Refills: 0 | COMMUNITY
Start: 2017-01-04

## 2017-01-04 RX ADMIN — Medication 100 MILLIGRAM(S): at 06:40

## 2017-01-04 RX ADMIN — OXYCODONE HYDROCHLORIDE 5 MILLIGRAM(S): 5 TABLET ORAL at 02:00

## 2017-01-04 RX ADMIN — OXYCODONE HYDROCHLORIDE 5 MILLIGRAM(S): 5 TABLET ORAL at 12:10

## 2017-01-04 RX ADMIN — Medication 1: at 17:23

## 2017-01-04 RX ADMIN — POLYETHYLENE GLYCOL 3350 17 GRAM(S): 17 POWDER, FOR SOLUTION ORAL at 17:33

## 2017-01-04 RX ADMIN — Medication 50 MILLIGRAM(S): at 06:40

## 2017-01-04 RX ADMIN — VALSARTAN 160 MILLIGRAM(S): 80 TABLET ORAL at 06:40

## 2017-01-04 RX ADMIN — Medication 5 UNIT(S): at 17:22

## 2017-01-04 RX ADMIN — ENOXAPARIN SODIUM 40 MILLIGRAM(S): 100 INJECTION SUBCUTANEOUS at 13:19

## 2017-01-04 RX ADMIN — OXYCODONE HYDROCHLORIDE 5 MILLIGRAM(S): 5 TABLET ORAL at 01:18

## 2017-01-04 RX ADMIN — OXYCODONE HYDROCHLORIDE 5 MILLIGRAM(S): 5 TABLET ORAL at 16:30

## 2017-01-04 RX ADMIN — OXYCODONE HYDROCHLORIDE 5 MILLIGRAM(S): 5 TABLET ORAL at 15:42

## 2017-01-04 RX ADMIN — Medication 50 MILLIGRAM(S): at 17:33

## 2017-01-04 RX ADMIN — POLYETHYLENE GLYCOL 3350 17 GRAM(S): 17 POWDER, FOR SOLUTION ORAL at 06:40

## 2017-01-04 RX ADMIN — Medication 5 UNIT(S): at 12:40

## 2017-01-04 RX ADMIN — LIDOCAINE 1 PATCH: 4 CREAM TOPICAL at 11:20

## 2017-01-04 RX ADMIN — Medication 100 MILLIGRAM(S): at 13:19

## 2017-01-04 RX ADMIN — OXYCODONE HYDROCHLORIDE 5 MILLIGRAM(S): 5 TABLET ORAL at 11:20

## 2017-01-04 RX ADMIN — Medication 5 UNIT(S): at 08:33

## 2017-01-06 ENCOUNTER — OUTPATIENT (OUTPATIENT)
Dept: OUTPATIENT SERVICES | Facility: HOSPITAL | Age: 72
LOS: 1 days | Discharge: HOME | End: 2017-01-06
Payer: MEDICARE

## 2017-01-06 DIAGNOSIS — I72.4 ANEURYSM OF ARTERY OF LOWER EXTREMITY: ICD-10-CM

## 2017-01-06 DIAGNOSIS — Z87.81 PERSONAL HISTORY OF (HEALED) TRAUMATIC FRACTURE: Chronic | ICD-10-CM

## 2017-01-06 PROCEDURE — 71101 X-RAY EXAM UNILAT RIBS/CHEST: CPT | Mod: 26,RT

## 2017-06-13 ENCOUNTER — APPOINTMENT (OUTPATIENT)
Dept: OPHTHALMOLOGY | Facility: CLINIC | Age: 72
End: 2017-06-13

## 2017-06-21 ENCOUNTER — OUTPATIENT (OUTPATIENT)
Dept: OUTPATIENT SERVICES | Facility: HOSPITAL | Age: 72
LOS: 1 days | End: 2017-06-21
Payer: MEDICARE

## 2017-06-21 ENCOUNTER — RESULT REVIEW (OUTPATIENT)
Age: 72
End: 2017-06-21

## 2017-06-21 DIAGNOSIS — Z98.51 TUBAL LIGATION STATUS: Chronic | ICD-10-CM

## 2017-06-21 DIAGNOSIS — Z87.81 PERSONAL HISTORY OF (HEALED) TRAUMATIC FRACTURE: Chronic | ICD-10-CM

## 2017-06-21 DIAGNOSIS — D64.9 ANEMIA, UNSPECIFIED: ICD-10-CM

## 2017-06-21 DIAGNOSIS — N60.02 SOLITARY CYST OF LEFT BREAST: Chronic | ICD-10-CM

## 2017-06-21 DIAGNOSIS — Z96.7 PRESENCE OF OTHER BONE AND TENDON IMPLANTS: Chronic | ICD-10-CM

## 2017-06-21 DIAGNOSIS — Z98.89 OTHER SPECIFIED POSTPROCEDURAL STATES: Chronic | ICD-10-CM

## 2017-06-21 PROCEDURE — 88312 SPECIAL STAINS GROUP 1: CPT | Mod: 26

## 2017-06-21 PROCEDURE — 43239 EGD BIOPSY SINGLE/MULTIPLE: CPT

## 2017-06-21 PROCEDURE — 45385 COLONOSCOPY W/LESION REMOVAL: CPT

## 2017-06-21 PROCEDURE — 88305 TISSUE EXAM BY PATHOLOGIST: CPT

## 2017-06-21 PROCEDURE — 88305 TISSUE EXAM BY PATHOLOGIST: CPT | Mod: 26

## 2017-06-21 PROCEDURE — 88312 SPECIAL STAINS GROUP 1: CPT

## 2017-06-22 ENCOUNTER — OUTPATIENT (OUTPATIENT)
Dept: OUTPATIENT SERVICES | Facility: HOSPITAL | Age: 72
LOS: 1 days | End: 2017-06-22
Payer: MEDICARE

## 2017-06-22 ENCOUNTER — RESULT REVIEW (OUTPATIENT)
Age: 72
End: 2017-06-22

## 2017-06-22 DIAGNOSIS — Z98.89 OTHER SPECIFIED POSTPROCEDURAL STATES: Chronic | ICD-10-CM

## 2017-06-22 DIAGNOSIS — D64.9 ANEMIA, UNSPECIFIED: ICD-10-CM

## 2017-06-22 DIAGNOSIS — Z87.81 PERSONAL HISTORY OF (HEALED) TRAUMATIC FRACTURE: Chronic | ICD-10-CM

## 2017-06-22 DIAGNOSIS — Z98.51 TUBAL LIGATION STATUS: Chronic | ICD-10-CM

## 2017-06-22 DIAGNOSIS — N60.02 SOLITARY CYST OF LEFT BREAST: Chronic | ICD-10-CM

## 2017-06-22 DIAGNOSIS — Z96.7 PRESENCE OF OTHER BONE AND TENDON IMPLANTS: Chronic | ICD-10-CM

## 2017-06-22 LAB — SURGICAL PATHOLOGY STUDY: SIGNIFICANT CHANGE UP

## 2017-06-22 PROCEDURE — 88305 TISSUE EXAM BY PATHOLOGIST: CPT | Mod: 26

## 2017-06-22 PROCEDURE — 88305 TISSUE EXAM BY PATHOLOGIST: CPT

## 2017-06-22 PROCEDURE — 45385 COLONOSCOPY W/LESION REMOVAL: CPT

## 2017-06-22 PROCEDURE — 45380 COLONOSCOPY AND BIOPSY: CPT | Mod: 59

## 2017-06-23 LAB — SURGICAL PATHOLOGY STUDY: SIGNIFICANT CHANGE UP

## 2017-07-31 ENCOUNTER — EMERGENCY (EMERGENCY)
Facility: HOSPITAL | Age: 72
LOS: 1 days | Discharge: HOME | End: 2017-07-31
Attending: EMERGENCY MEDICINE | Admitting: EMERGENCY MEDICINE
Payer: MEDICARE

## 2017-07-31 VITALS
OXYGEN SATURATION: 100 % | HEART RATE: 90 BPM | TEMPERATURE: 98 F | RESPIRATION RATE: 18 BRPM | SYSTOLIC BLOOD PRESSURE: 156 MMHG | DIASTOLIC BLOOD PRESSURE: 117 MMHG

## 2017-07-31 DIAGNOSIS — Z98.89 OTHER SPECIFIED POSTPROCEDURAL STATES: Chronic | ICD-10-CM

## 2017-07-31 DIAGNOSIS — N60.02 SOLITARY CYST OF LEFT BREAST: Chronic | ICD-10-CM

## 2017-07-31 DIAGNOSIS — Z96.7 PRESENCE OF OTHER BONE AND TENDON IMPLANTS: Chronic | ICD-10-CM

## 2017-07-31 DIAGNOSIS — Z87.81 PERSONAL HISTORY OF (HEALED) TRAUMATIC FRACTURE: Chronic | ICD-10-CM

## 2017-07-31 DIAGNOSIS — Z98.51 TUBAL LIGATION STATUS: Chronic | ICD-10-CM

## 2017-07-31 PROCEDURE — 93010 ELECTROCARDIOGRAM REPORT: CPT

## 2017-07-31 PROCEDURE — 71250 CT THORAX DX C-: CPT | Mod: 26

## 2017-07-31 PROCEDURE — 99285 EMERGENCY DEPT VISIT HI MDM: CPT | Mod: 25

## 2017-07-31 RX ORDER — IBUPROFEN 200 MG
600 TABLET ORAL ONCE
Qty: 0 | Refills: 0 | Status: COMPLETED | OUTPATIENT
Start: 2017-07-31 | End: 2017-07-31

## 2017-07-31 RX ADMIN — Medication 600 MILLIGRAM(S): at 10:35

## 2017-07-31 NOTE — ED PROVIDER NOTE - MEDICAL DECISION MAKING DETAILS
fell 3 days ago hitting chest wall. only findings are chest wall pain. As xrays are relatively insensitive, will obtain CT chest

## 2017-07-31 NOTE — ED PROVIDER NOTE - PROGRESS NOTE DETAILS
joann: possible fractures 3 and 4th rib. no other pathology. will give incentive spirometry, nsaids and recc fu pcp

## 2017-07-31 NOTE — ED PROVIDER NOTE - OBJECTIVE STATEMENT
joann: tripped and fell to anterior chest wall 3 days prior, hitting floor first. only pain was to chest wall and left shoulder (milder). chest pain progressively worsening. no relief from tylenol. denies head or neck trauma.

## 2017-07-31 NOTE — ED PROVIDER NOTE - MUSCULOSKELETAL, MLM
Spine appears normal, range of motion is not limited, no muscle or joint tenderness. full ROM appropriate for age

## 2017-07-31 NOTE — ED ADULT NURSE NOTE - CHIEF COMPLAINT QUOTE
Pt states she tripped and fell on Thursday. States she landed on her chest and left arm. Pain progressively getting worse. Hx of diabetes, htn, high cholesterol.

## 2017-09-21 ENCOUNTER — EMERGENCY (EMERGENCY)
Facility: HOSPITAL | Age: 72
LOS: 1 days | Discharge: HOME | End: 2017-09-21
Attending: EMERGENCY MEDICINE | Admitting: EMERGENCY MEDICINE
Payer: MEDICARE

## 2017-09-21 VITALS
HEART RATE: 74 BPM | DIASTOLIC BLOOD PRESSURE: 114 MMHG | OXYGEN SATURATION: 100 % | RESPIRATION RATE: 16 BRPM | SYSTOLIC BLOOD PRESSURE: 161 MMHG | TEMPERATURE: 98 F

## 2017-09-21 DIAGNOSIS — Z87.81 PERSONAL HISTORY OF (HEALED) TRAUMATIC FRACTURE: Chronic | ICD-10-CM

## 2017-09-21 DIAGNOSIS — Z98.51 TUBAL LIGATION STATUS: Chronic | ICD-10-CM

## 2017-09-21 DIAGNOSIS — Z98.89 OTHER SPECIFIED POSTPROCEDURAL STATES: Chronic | ICD-10-CM

## 2017-09-21 DIAGNOSIS — Z96.7 PRESENCE OF OTHER BONE AND TENDON IMPLANTS: Chronic | ICD-10-CM

## 2017-09-21 DIAGNOSIS — N60.02 SOLITARY CYST OF LEFT BREAST: Chronic | ICD-10-CM

## 2017-09-21 PROCEDURE — 99284 EMERGENCY DEPT VISIT MOD MDM: CPT | Mod: 25

## 2017-09-21 NOTE — ED ADULT TRIAGE NOTE - CHIEF COMPLAINT QUOTE
Pt arrives c/o trip and fall down stairs, missed a step and fell to Left side, no head trauma or LOC.  Pt reports pain to entire left side, L lateral Wrist w/ +Deformity, very painful per pt.  Abrasion w/ dried blood to L Knee, no swelling noted. Pt ambulatory w/ steady gait currently, denies dizziness.  Denies Anticoag use. Pt arrives c/o trip and fall, missed a step and fell to Left side, no head trauma or LOC.  Pt reports pain to entire left side, L lateral Wrist w/ +Deformity, very painful per pt.  Abrasion w/ dried blood to L Knee, no swelling noted. Pt ambulatory w/ steady gait currently, denies dizziness.  Denies Anticoag use.

## 2017-09-22 PROCEDURE — 73110 X-RAY EXAM OF WRIST: CPT | Mod: 26,76,LT

## 2017-09-22 RX ORDER — IBUPROFEN 200 MG
600 TABLET ORAL ONCE
Qty: 0 | Refills: 0 | Status: COMPLETED | OUTPATIENT
Start: 2017-09-22 | End: 2017-09-22

## 2017-09-22 RX ORDER — ACETAMINOPHEN 500 MG
650 TABLET ORAL ONCE
Qty: 0 | Refills: 0 | Status: COMPLETED | OUTPATIENT
Start: 2017-09-22 | End: 2017-09-22

## 2017-09-22 RX ADMIN — Medication 600 MILLIGRAM(S): at 00:34

## 2017-09-22 RX ADMIN — Medication 650 MILLIGRAM(S): at 00:34

## 2017-09-22 NOTE — ED PROVIDER NOTE - OBJECTIVE STATEMENT
72y F presents to the ED for left wrist pain s/p fall. Pt states she missed last step walking down stairs and fell on left wrist. Pain is severe and drove to ED after incident. Pt is right hand dominant. Did not take medications before arrival. Denies HIV testing

## 2017-09-22 NOTE — ED PROVIDER NOTE - CONSTITUTIONAL, MLM
normal... Well appearing, well nourished, awake, alert, oriented to person, place, time/situation. Appears uncomfortable. Good hygiene

## 2017-09-22 NOTE — ED PROVIDER NOTE - PROGRESS NOTE DETAILS
MICHAEL Rojas- Spoke with radiology resident, xray neg for scaphoid fracture. Pt placed in thumb spica splint, hand f/u given. stable for dc

## 2017-10-23 ENCOUNTER — EMERGENCY (EMERGENCY)
Facility: HOSPITAL | Age: 72
LOS: 1 days | Discharge: HOME | End: 2017-10-23
Attending: EMERGENCY MEDICINE | Admitting: EMERGENCY MEDICINE
Payer: MEDICARE

## 2017-10-23 VITALS
OXYGEN SATURATION: 100 % | RESPIRATION RATE: 18 BRPM | HEART RATE: 64 BPM | TEMPERATURE: 99 F | SYSTOLIC BLOOD PRESSURE: 143 MMHG | DIASTOLIC BLOOD PRESSURE: 80 MMHG

## 2017-10-23 DIAGNOSIS — Z96.7 PRESENCE OF OTHER BONE AND TENDON IMPLANTS: Chronic | ICD-10-CM

## 2017-10-23 DIAGNOSIS — Z87.81 PERSONAL HISTORY OF (HEALED) TRAUMATIC FRACTURE: Chronic | ICD-10-CM

## 2017-10-23 DIAGNOSIS — Z98.51 TUBAL LIGATION STATUS: Chronic | ICD-10-CM

## 2017-10-23 DIAGNOSIS — Z98.89 OTHER SPECIFIED POSTPROCEDURAL STATES: Chronic | ICD-10-CM

## 2017-10-23 DIAGNOSIS — N60.02 SOLITARY CYST OF LEFT BREAST: Chronic | ICD-10-CM

## 2017-10-23 PROCEDURE — 73110 X-RAY EXAM OF WRIST: CPT | Mod: 26,LT

## 2017-10-23 PROCEDURE — 99284 EMERGENCY DEPT VISIT MOD MDM: CPT

## 2017-10-23 RX ORDER — ACETAMINOPHEN 500 MG
650 TABLET ORAL ONCE
Qty: 0 | Refills: 0 | Status: COMPLETED | OUTPATIENT
Start: 2017-10-23 | End: 2017-10-23

## 2017-10-23 RX ADMIN — Medication 650 MILLIGRAM(S): at 12:03

## 2017-10-23 NOTE — ED PROVIDER NOTE - OBJECTIVE STATEMENT
71 y/o F w/ PMHx of anemia, DM, HTN, HLD and radius fracture, presents to the ED c/o left wrist pain x1 month s/p mechanical fall. Pain is rated 7/10 in severity. Pt states she was seen in ED 1 month ago, dx w/ an old left wrist fracture and had splint placed. Pt notes her ace wrap/splint came off, can't re-wrap it on her own and wants to know if she still needs to wear it. Denies numbness/tingling or any other complaints. NKDA. Meds: Metformin, Metoprolol

## 2017-10-23 NOTE — ED ADULT TRIAGE NOTE - CHIEF COMPLAINT QUOTE
s/p left wrist fracture 1 month ago.  arrives today stating her ace wrap/splint came off and she cant re-wrap it and also want to know if she still needs to wear it.  pt still c/o soreness to area.

## 2017-10-23 NOTE — ED PROVIDER NOTE - PROGRESS NOTE DETAILS
marcella shah- x ray findings reviewed with attending.  patient to follow up with ortho as outpatient. ready for discharge

## 2017-10-23 NOTE — ED PROVIDER NOTE - MEDICAL DECISION MAKING DETAILS
71 y/o F w/ likely wrist sprain. Will XR to evaluate for evidence of any healing fractures, pain control and reassess.

## 2017-10-23 NOTE — ED PROVIDER NOTE - CARE PLAN
Principal Discharge DX:	History of wrist fracture  Instructions for follow-up, activity and diet:	follow up with your pmd regarding your recent hospital stay in the next 24-48 hours.  if symptoms worsen return to the ed immediately.   follow up with orthopedics either in the clinic call for appointment  or call for appointment - referral list provided Principal Discharge DX:	History of wrist fracture  Instructions for follow-up, activity and diet:	follow up with your pmd regarding your recent hospital stay in the next 24-48 hours.  if symptoms worsen return to the ed immediately.   follow up with orthopedics either in the clinic call for appointment  or call for appointment - referral list provided.  start range of motion exercises may need physical therapy referral

## 2017-10-23 NOTE — ED PROVIDER NOTE - UPPER EXTREMITY EXAM, LEFT
no soft tissue swelling, no deformity of left hand, mild tenderness of dorsum of carpus, no radius or ulnar tenderness, mild snuff box tenderness, FROM of fingers, pulses normal, sensation intact

## 2017-10-23 NOTE — ED PROVIDER NOTE - PLAN OF CARE
follow up with your pmd regarding your recent hospital stay in the next 24-48 hours.  if symptoms worsen return to the ed immediately.   follow up with orthopedics either in the clinic call for appointment  or call for appointment - referral list provided follow up with your pmd regarding your recent hospital stay in the next 24-48 hours.  if symptoms worsen return to the ed immediately.   follow up with orthopedics either in the clinic call for appointment  or call for appointment - referral list provided.  start range of motion exercises may need physical therapy referral

## 2018-01-04 ENCOUNTER — EMERGENCY (EMERGENCY)
Facility: HOSPITAL | Age: 73
LOS: 0 days | Discharge: HOME | End: 2018-01-04
Attending: EMERGENCY MEDICINE
Payer: MEDICARE

## 2018-01-04 VITALS
TEMPERATURE: 98 F | HEART RATE: 79 BPM | WEIGHT: 179.9 LBS | SYSTOLIC BLOOD PRESSURE: 136 MMHG | OXYGEN SATURATION: 99 % | DIASTOLIC BLOOD PRESSURE: 106 MMHG | HEIGHT: 66 IN

## 2018-01-04 VITALS
RESPIRATION RATE: 16 BRPM | HEART RATE: 76 BPM | SYSTOLIC BLOOD PRESSURE: 126 MMHG | DIASTOLIC BLOOD PRESSURE: 77 MMHG | OXYGEN SATURATION: 98 %

## 2018-01-04 DIAGNOSIS — E11.9 TYPE 2 DIABETES MELLITUS WITHOUT COMPLICATIONS: ICD-10-CM
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
## 2018-01-04 DIAGNOSIS — S61.213A LACERATION WITHOUT FOREIGN BODY OF LEFT MIDDLE FINGER WITHOUT DAMAGE TO NAIL, INITIAL ENCOUNTER: ICD-10-CM

## 2018-01-04 DIAGNOSIS — N60.02 SOLITARY CYST OF LEFT BREAST: Chronic | ICD-10-CM

## 2018-01-04 DIAGNOSIS — S92.911A UNSPECIFIED FRACTURE OF RIGHT TOE(S), INITIAL ENCOUNTER FOR CLOSED FRACTURE: ICD-10-CM
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
## 2018-01-04 DIAGNOSIS — Z23 ENCOUNTER FOR IMMUNIZATION: ICD-10-CM

## 2018-01-04 DIAGNOSIS — I10 ESSENTIAL (PRIMARY) HYPERTENSION: ICD-10-CM

## 2018-01-04 DIAGNOSIS — S61.511A LACERATION WITHOUT FOREIGN BODY OF RIGHT WRIST, INITIAL ENCOUNTER: ICD-10-CM

## 2018-01-04 PROCEDURE — 99284 EMERGENCY DEPT VISIT MOD MDM: CPT | Mod: 57,25

## 2018-01-04 PROCEDURE — 28510 TREATMENT OF TOE FRACTURE: CPT | Mod: 54

## 2018-01-04 PROCEDURE — 73660 X-RAY EXAM OF TOE(S): CPT | Mod: 26,RT

## 2018-01-04 PROCEDURE — 73140 X-RAY EXAM OF FINGER(S): CPT | Mod: 26,LT

## 2018-01-04 PROCEDURE — 12002 RPR S/N/AX/GEN/TRNK2.6-7.5CM: CPT | Mod: 59

## 2018-01-04 RX ORDER — TETANUS TOXOID, REDUCED DIPHTHERIA TOXOID AND ACELLULAR PERTUSSIS VACCINE, ADSORBED 5; 2.5; 8; 8; 2.5 [IU]/.5ML; [IU]/.5ML; UG/.5ML; UG/.5ML; UG/.5ML
0.5 SUSPENSION INTRAMUSCULAR ONCE
Refills: 0 | Status: COMPLETED | OUTPATIENT
Start: 2018-01-04 | End: 2018-01-04

## 2018-01-04 RX ORDER — ACETAMINOPHEN 500 MG
650 TABLET ORAL ONCE
Refills: 0 | Status: COMPLETED | OUTPATIENT
Start: 2018-01-04 | End: 2018-01-04

## 2018-01-04 RX ADMIN — Medication 650 MILLIGRAM(S): at 17:05

## 2018-01-04 RX ADMIN — TETANUS TOXOID, REDUCED DIPHTHERIA TOXOID AND ACELLULAR PERTUSSIS VACCINE, ADSORBED 0.5 MILLILITER(S): 5; 2.5; 8; 8; 2.5 SUSPENSION INTRAMUSCULAR at 17:05

## 2018-01-04 NOTE — ED PROVIDER NOTE - MEDICAL DECISION MAKING DETAILS
Lac repaired with splint on finger. toe jorge taped. dc with cane. tdap given. Discussed results and outcome of testing with the patient.  Patient given copy of available results. Patient advised to please follow up with their PMD within the next 24 hours and return to the Emergency Department for worsening symptoms or any other concerns.

## 2018-01-04 NOTE — ED PROVIDER NOTE - CARE PLAN
Principal Discharge DX:	Laceration of hand  Secondary Diagnosis:	Laceration of wrist  Secondary Diagnosis:	Toe fracture, right

## 2018-01-04 NOTE — ED PROCEDURE NOTE - CPROC ED TIME OUT STATEMENT1
“Patient's name, , procedure and correct site were confirmed during the [place] Timeout.”
“Patient's name, , procedure and correct site were confirmed during the [place] Timeout.”
“Patient's name, , procedure and correct site were confirmed during the [place] Timeout.”

## 2018-01-04 NOTE — ED ADULT TRIAGE NOTE - CHIEF COMPLAINT QUOTE
pt status post trip and fall this afternoon. pt states she fell with a glass in her hand . lac to left middle finger. pt denies head injury or LOC. no blood thinners.

## 2018-01-04 NOTE — ED PROCEDURE NOTE - CPROC ED LACER REPAIR DETAIL1
The wound was explored to base in bloodless field.
The wound was explored to base in bloodless field.

## 2018-01-04 NOTE — ED PROVIDER NOTE - PHYSICAL EXAMINATION
Gen: Alert, Well appearing. NAD    Head: NC, AT, PERRL, EOMI, normal lids/conjunctiva   ENT: Bilateral TM WNL, normal hearing, patent oropharynx without erythema/exudate, uvula midline  Neck: supple, no tenderness/meningismus/JVD   Pulm: Bilateral clear BS, normal resp effort, no wheeze/stridor/retractions  CV: RRR, no M/R/G, +dist pulses   Abd: soft, NT/ND, +BS, no guarding/rebound tenderness  Mskel: linear 4cm superficial vertical lac to volar pip area. 2cm very superficial lac over ulnart styloid of rt wrist. from, power and pulses intact. 2nd big toe with swelling and tender to palpation. cr intact.   Skin: no rash   Neuro: AAOx3, no sensory/motor deficits, CN 2-12 intact

## 2018-01-04 NOTE — ED PROCEDURE NOTE - CPROC ED POST PROC CARE GUIDE1
Verbal/written post procedure instructions were given to patient/caregiver.

## 2018-01-04 NOTE — ED ADULT NURSE NOTE - OBJECTIVE STATEMENT
patient tripped and fell in the kitchen, reports pain of 7 on a scale of 0 to 10, pain the in the right foot, right wrist and left hand, laceration in right wrist and left third digit finger

## 2018-01-04 NOTE — ED PROVIDER NOTE - OBJECTIVE STATEMENT
71yo female with pmh DM, HTN, HL, presents s/p trip and fall. States water on floor in kitchen and had glass in hand. She slipped and landed on rt side. denies head trauma, loc, headache. Pt with lac to 3rd finger, rt lateral wrist, and has pain to 2nd toe.     ROS: No fever/chills. No photophobia/eye pain/changes in vision, No ear pain/sore throat/dysphagia, No chest pain/palpitations. No SOB/cough/stridor. No abdominal pain, N/V/D, no black/bloody bm. No dysuria/frequency/discharge, No headache. No Dizziness.  + LAc  No numbness/tingling/weakness.

## 2018-02-01 NOTE — ED ADULT TRIAGE NOTE - CHIEF COMPLAINT QUOTE
Blood drawn, a rainbow with lactic in Triage. ECG done at 1824  
Patient denies dizziness at this time.   
Provider at bedside. Waiting on lab results.   
Pt ambulated to the bathroom with a steady gait.   
Pt states she tripped and fell on Thursday. States she landed on her chest and left arm. Pain progressively getting worse. Hx of diabetes, htn, high cholesterol.

## 2018-06-13 ENCOUNTER — INPATIENT (INPATIENT)
Facility: HOSPITAL | Age: 73
LOS: 2 days | Discharge: NURSING, REHAB OR LONG-TERM CARE | End: 2018-06-16
Attending: HOSPITALIST | Admitting: HOSPITALIST
Payer: MEDICARE

## 2018-06-13 VITALS
TEMPERATURE: 98 F | RESPIRATION RATE: 18 BRPM | OXYGEN SATURATION: 99 % | HEART RATE: 79 BPM | SYSTOLIC BLOOD PRESSURE: 146 MMHG | DIASTOLIC BLOOD PRESSURE: 84 MMHG

## 2018-06-13 DIAGNOSIS — Z98.89 OTHER SPECIFIED POSTPROCEDURAL STATES: Chronic | ICD-10-CM

## 2018-06-13 DIAGNOSIS — S82.002S UNSPECIFIED FRACTURE OF LEFT PATELLA, SEQUELA: ICD-10-CM

## 2018-06-13 DIAGNOSIS — E78.5 HYPERLIPIDEMIA, UNSPECIFIED: ICD-10-CM

## 2018-06-13 DIAGNOSIS — S82.009A UNSPECIFIED FRACTURE OF UNSPECIFIED PATELLA, INITIAL ENCOUNTER FOR CLOSED FRACTURE: ICD-10-CM

## 2018-06-13 DIAGNOSIS — N60.02 SOLITARY CYST OF LEFT BREAST: Chronic | ICD-10-CM

## 2018-06-13 DIAGNOSIS — Z29.9 ENCOUNTER FOR PROPHYLACTIC MEASURES, UNSPECIFIED: ICD-10-CM

## 2018-06-13 DIAGNOSIS — E11.8 TYPE 2 DIABETES MELLITUS WITH UNSPECIFIED COMPLICATIONS: ICD-10-CM

## 2018-06-13 DIAGNOSIS — I10 ESSENTIAL (PRIMARY) HYPERTENSION: ICD-10-CM

## 2018-06-13 DIAGNOSIS — Z87.81 PERSONAL HISTORY OF (HEALED) TRAUMATIC FRACTURE: Chronic | ICD-10-CM

## 2018-06-13 DIAGNOSIS — Z98.51 TUBAL LIGATION STATUS: Chronic | ICD-10-CM

## 2018-06-13 DIAGNOSIS — Z96.7 PRESENCE OF OTHER BONE AND TENDON IMPLANTS: Chronic | ICD-10-CM

## 2018-06-13 LAB
ALBUMIN SERPL ELPH-MCNC: 3.8 G/DL — SIGNIFICANT CHANGE UP (ref 3.3–5)
ALP SERPL-CCNC: 91 U/L — SIGNIFICANT CHANGE UP (ref 40–120)
ALT FLD-CCNC: 17 U/L — SIGNIFICANT CHANGE UP (ref 4–33)
APTT BLD: 29.7 SEC — SIGNIFICANT CHANGE UP (ref 27.5–37.4)
AST SERPL-CCNC: 25 U/L — SIGNIFICANT CHANGE UP (ref 4–32)
BASOPHILS # BLD AUTO: 0.02 K/UL — SIGNIFICANT CHANGE UP (ref 0–0.2)
BASOPHILS NFR BLD AUTO: 0.4 % — SIGNIFICANT CHANGE UP (ref 0–2)
BILIRUB SERPL-MCNC: 0.2 MG/DL — SIGNIFICANT CHANGE UP (ref 0.2–1.2)
BLD GP AB SCN SERPL QL: NEGATIVE — SIGNIFICANT CHANGE UP
BUN SERPL-MCNC: 14 MG/DL — SIGNIFICANT CHANGE UP (ref 7–23)
CALCIUM SERPL-MCNC: 9 MG/DL — SIGNIFICANT CHANGE UP (ref 8.4–10.5)
CHLORIDE SERPL-SCNC: 101 MMOL/L — SIGNIFICANT CHANGE UP (ref 98–107)
CO2 SERPL-SCNC: 26 MMOL/L — SIGNIFICANT CHANGE UP (ref 22–31)
CREAT SERPL-MCNC: 0.87 MG/DL — SIGNIFICANT CHANGE UP (ref 0.5–1.3)
EOSINOPHIL # BLD AUTO: 0.13 K/UL — SIGNIFICANT CHANGE UP (ref 0–0.5)
EOSINOPHIL NFR BLD AUTO: 2.9 % — SIGNIFICANT CHANGE UP (ref 0–6)
GLUCOSE SERPL-MCNC: 161 MG/DL — HIGH (ref 70–99)
HCT VFR BLD CALC: 33.4 % — LOW (ref 34.5–45)
HGB BLD-MCNC: 10.3 G/DL — LOW (ref 11.5–15.5)
IMM GRANULOCYTES # BLD AUTO: 0.01 # — SIGNIFICANT CHANGE UP
IMM GRANULOCYTES NFR BLD AUTO: 0.2 % — SIGNIFICANT CHANGE UP (ref 0–1.5)
INR BLD: 0.97 — SIGNIFICANT CHANGE UP (ref 0.88–1.17)
LYMPHOCYTES # BLD AUTO: 1.74 K/UL — SIGNIFICANT CHANGE UP (ref 1–3.3)
LYMPHOCYTES # BLD AUTO: 38.4 % — SIGNIFICANT CHANGE UP (ref 13–44)
MCHC RBC-ENTMCNC: 23 PG — LOW (ref 27–34)
MCHC RBC-ENTMCNC: 30.8 % — LOW (ref 32–36)
MCV RBC AUTO: 74.7 FL — LOW (ref 80–100)
MONOCYTES # BLD AUTO: 0.32 K/UL — SIGNIFICANT CHANGE UP (ref 0–0.9)
MONOCYTES NFR BLD AUTO: 7.1 % — SIGNIFICANT CHANGE UP (ref 2–14)
NEUTROPHILS # BLD AUTO: 2.31 K/UL — SIGNIFICANT CHANGE UP (ref 1.8–7.4)
NEUTROPHILS NFR BLD AUTO: 51 % — SIGNIFICANT CHANGE UP (ref 43–77)
NRBC # FLD: 0 — SIGNIFICANT CHANGE UP
PLATELET # BLD AUTO: 205 K/UL — SIGNIFICANT CHANGE UP (ref 150–400)
PMV BLD: 9.2 FL — SIGNIFICANT CHANGE UP (ref 7–13)
POTASSIUM SERPL-MCNC: 3.6 MMOL/L — SIGNIFICANT CHANGE UP (ref 3.5–5.3)
POTASSIUM SERPL-SCNC: 3.6 MMOL/L — SIGNIFICANT CHANGE UP (ref 3.5–5.3)
PROT SERPL-MCNC: 6.9 G/DL — SIGNIFICANT CHANGE UP (ref 6–8.3)
PROTHROM AB SERPL-ACNC: 11.2 SEC — SIGNIFICANT CHANGE UP (ref 9.8–13.1)
RBC # BLD: 4.47 M/UL — SIGNIFICANT CHANGE UP (ref 3.8–5.2)
RBC # FLD: 14.5 % — SIGNIFICANT CHANGE UP (ref 10.3–14.5)
RH IG SCN BLD-IMP: POSITIVE — SIGNIFICANT CHANGE UP
SODIUM SERPL-SCNC: 139 MMOL/L — SIGNIFICANT CHANGE UP (ref 135–145)
WBC # BLD: 4.53 K/UL — SIGNIFICANT CHANGE UP (ref 3.8–10.5)
WBC # FLD AUTO: 4.53 K/UL — SIGNIFICANT CHANGE UP (ref 3.8–10.5)

## 2018-06-13 PROCEDURE — 73590 X-RAY EXAM OF LOWER LEG: CPT | Mod: 26,50

## 2018-06-13 PROCEDURE — 73562 X-RAY EXAM OF KNEE 3: CPT | Mod: 26,50

## 2018-06-13 PROCEDURE — 73552 X-RAY EXAM OF FEMUR 2/>: CPT | Mod: 26,LT

## 2018-06-13 PROCEDURE — 73610 X-RAY EXAM OF ANKLE: CPT | Mod: 26,50

## 2018-06-13 PROCEDURE — 72170 X-RAY EXAM OF PELVIS: CPT | Mod: 26

## 2018-06-13 PROCEDURE — 73080 X-RAY EXAM OF ELBOW: CPT | Mod: 26,LT

## 2018-06-13 PROCEDURE — 99223 1ST HOSP IP/OBS HIGH 75: CPT

## 2018-06-13 RX ORDER — ENOXAPARIN SODIUM 100 MG/ML
40 INJECTION SUBCUTANEOUS EVERY 24 HOURS
Qty: 0 | Refills: 0 | Status: DISCONTINUED | OUTPATIENT
Start: 2018-06-13 | End: 2018-06-16

## 2018-06-13 RX ORDER — DEXTROSE 50 % IN WATER 50 %
25 SYRINGE (ML) INTRAVENOUS ONCE
Qty: 0 | Refills: 0 | Status: DISCONTINUED | OUTPATIENT
Start: 2018-06-13 | End: 2018-06-16

## 2018-06-13 RX ORDER — DEXTROSE 50 % IN WATER 50 %
15 SYRINGE (ML) INTRAVENOUS ONCE
Qty: 0 | Refills: 0 | Status: DISCONTINUED | OUTPATIENT
Start: 2018-06-13 | End: 2018-06-16

## 2018-06-13 RX ORDER — OXYCODONE AND ACETAMINOPHEN 5; 325 MG/1; MG/1
1 TABLET ORAL ONCE
Qty: 0 | Refills: 0 | Status: DISCONTINUED | OUTPATIENT
Start: 2018-06-13 | End: 2018-06-13

## 2018-06-13 RX ORDER — INSULIN GLARGINE 100 [IU]/ML
30 INJECTION, SOLUTION SUBCUTANEOUS ONCE
Qty: 0 | Refills: 0 | Status: COMPLETED | OUTPATIENT
Start: 2018-06-13 | End: 2018-06-14

## 2018-06-13 RX ORDER — OXYCODONE AND ACETAMINOPHEN 5; 325 MG/1; MG/1
1 TABLET ORAL EVERY 6 HOURS
Qty: 0 | Refills: 0 | Status: DISCONTINUED | OUTPATIENT
Start: 2018-06-13 | End: 2018-06-16

## 2018-06-13 RX ORDER — GLUCAGON INJECTION, SOLUTION 0.5 MG/.1ML
1 INJECTION, SOLUTION SUBCUTANEOUS ONCE
Qty: 0 | Refills: 0 | Status: DISCONTINUED | OUTPATIENT
Start: 2018-06-13 | End: 2018-06-16

## 2018-06-13 RX ORDER — INSULIN LISPRO 100/ML
VIAL (ML) SUBCUTANEOUS AT BEDTIME
Qty: 0 | Refills: 0 | Status: DISCONTINUED | OUTPATIENT
Start: 2018-06-13 | End: 2018-06-16

## 2018-06-13 RX ORDER — MORPHINE SULFATE 50 MG/1
4 CAPSULE, EXTENDED RELEASE ORAL ONCE
Qty: 0 | Refills: 0 | Status: DISCONTINUED | OUTPATIENT
Start: 2018-06-13 | End: 2018-06-13

## 2018-06-13 RX ORDER — INSULIN GLARGINE 100 [IU]/ML
30 INJECTION, SOLUTION SUBCUTANEOUS AT BEDTIME
Qty: 0 | Refills: 0 | Status: DISCONTINUED | OUTPATIENT
Start: 2018-06-14 | End: 2018-06-16

## 2018-06-13 RX ORDER — INSULIN LISPRO 100/ML
VIAL (ML) SUBCUTANEOUS
Qty: 0 | Refills: 0 | Status: DISCONTINUED | OUTPATIENT
Start: 2018-06-13 | End: 2018-06-16

## 2018-06-13 RX ORDER — SODIUM CHLORIDE 9 MG/ML
1000 INJECTION, SOLUTION INTRAVENOUS
Qty: 0 | Refills: 0 | Status: DISCONTINUED | OUTPATIENT
Start: 2018-06-13 | End: 2018-06-16

## 2018-06-13 RX ORDER — DEXTROSE 50 % IN WATER 50 %
12.5 SYRINGE (ML) INTRAVENOUS ONCE
Qty: 0 | Refills: 0 | Status: DISCONTINUED | OUTPATIENT
Start: 2018-06-13 | End: 2018-06-16

## 2018-06-13 RX ADMIN — OXYCODONE AND ACETAMINOPHEN 1 TABLET(S): 5; 325 TABLET ORAL at 19:34

## 2018-06-13 RX ADMIN — MORPHINE SULFATE 4 MILLIGRAM(S): 50 CAPSULE, EXTENDED RELEASE ORAL at 19:34

## 2018-06-13 RX ADMIN — MORPHINE SULFATE 4 MILLIGRAM(S): 50 CAPSULE, EXTENDED RELEASE ORAL at 17:02

## 2018-06-13 NOTE — CONSULT NOTE ADULT - SUBJECTIVE AND OBJECTIVE BOX
Orthopaedic Surgery Consult Note    HPI:    74 y/o F with PMH as below who presents with LLE pain after mechanical fall. Patient was walking out of [employer] walking out of [employer] when she fell and landed hard of her left knee. She was immediately unable to bear weight on the leg. No other injuries reported, denies LOC, headstrike. No weakness, SOB, CP, at the time of fall.       PAST MEDICAL & SURGICAL HISTORY:  Anemia  Hypercholesterolemia  Radius fracture: left  HTN (hypertension), benign  Diabetes mellitus type II  S/P ORIF (open reduction internal fixation) fracture: left ankle; ~ 5-6 years ago  Breast cyst, left: Excision of left breast cyst; 1964  H/O tubal ligation: 1983  History of bunionectomy of left great toe  History of wrist fracture: ORIF left wrist ; 2013  History of cataract extraction: bilateral;  (Left 02/013 - right 03/3013)    [] No significant past history as reviewed with the patient and family    FAMILY HISTORY:  Family history of diabetes mellitus (Sibling)    [] Family history not pertinent as reviewed with the patient and family    SOCIAL HISTORY:    MEDICATIONS  (STANDING):    MEDICATIONS  (PRN):    Allergies    No Known Allergies    Intolerances        REVIEW OF SYSTEMS  [x] All review of systems negative except for those marked.  Systemic:	[] Fever		[] Chills		[] Night sweats		[] Fatigue	[] Other  [] Cardiovascular:  [] Pulmonary:  [] Renal/Urologic:  [] Gastrointestinal:  [] Metabolic:  [] Neurologic:  [] Hematologic:  [] ENT:  [] Ophthalmologic:  [] Musculoskeletal: see HPI    Vital Signs Last 24 Hrs  T(C): 36.9 (13 Jun 2018 12:47), Max: 36.9 (13 Jun 2018 12:47)  T(F): 98.5 (13 Jun 2018 12:47), Max: 98.5 (13 Jun 2018 12:47)  HR: 77 (13 Jun 2018 19:36) (77 - 79)  BP: 158/74 (13 Jun 2018 19:36) (146/84 - 158/74)  BP(mean): --  RR: 20 (13 Jun 2018 19:36) (18 - 20)  SpO2: 100% (13 Jun 2018 19:36) (99% - 100%)      PHYSICAL EXAM:  Exam:  Gen: NAD  LLE:  Superficial abrasions on skin right over central portion of patella  Diffuse swelling of L knee as compared to Right  Inability to straight leg raise   Motor: 5/5 EHL/FHL/TA/Gastrocnemius  Sensory: SILT DP/SP/S/S/T nerve distributions  Vascular: 2+ Dorsalis Pedis pulse                              10.3   4.53  )-----------( 205      ( 13 Jun 2018 16:00 )             33.4     06-13    139  |  101  |  14  ----------------------------<  161<H>  3.6   |  26  |  0.87    Ca    9.0      13 Jun 2018 16:20    TPro  6.9  /  Alb  3.8  /  TBili  0.2  /  DBili  x   /  AST  25  /  ALT  17  /  AlkPhos  91  06-13    PT/INR - ( 13 Jun 2018 16:20 )   PT: 11.2 SEC;   INR: 0.97          PTT - ( 13 Jun 2018 16:20 )  PTT:29.7 SEC      IMAGING STUDIES:  L knee XR  Comminuted L patella fracture. No evidence of patella baja or elsa indicative of rupture extensor mechanism.     73y Female who presented with comminuted L patella fracture. Patient placed in Bulky Yang Dressing with knee immobilizer and reported immediate relief.    -pain control  -PT  -NWB in bulky yang knee immobilizer  -OOB  -DVT ppx  -dispo plan  -No acute orthopaedic intervention at this time  -May follow up with Dr. Abhinav Dempsey as outpatient

## 2018-06-13 NOTE — H&P ADULT - HISTORY OF PRESENT ILLNESS
74 y/o F with h/o dm, htn, hld; presents with LLE pain after mechanical fall tripping over shopping cart wheel. She was immediately unable to bear weight on the leg. No other injuries reported, denies LOC, headstrike. No weakness, SOB, CP, at the time of fall. Plainfilm + for acute left patellar fracture. Seen by ortho, placed in bulky sly knee immobilizer 72 y/o [place]  F with h/o dm, htn, hld; presents with LLE pain after mechanical fall tripping over shopping cart wheel. She was immediately unable to bear weight on the leg. No other injuries reported, denies LOC, headstrike. No weakness, SOB, CP, at the time of fall. Plainfilm + for acute left patellar fracture. Seen by ortho, placed in bulky chu knee immobilizer.   Denies recent change to bp or dm med regimen, denies low FS recordings at home; denies mer-episodic cp, sob, lightheadedness, palpitations   last fell (mechanical) Jan 2017    EKG sinus stephanie 56 bpm, 1st degree HB

## 2018-06-13 NOTE — ED PROVIDER NOTE - LOWER EXTREMITY EXAM, LEFT
SWELLING/JOINT SWELLING/DEFORMITY/BRUISING/LIMITED ROM/TENDERNESS/of knee. ankle with full rom, no swelling

## 2018-06-13 NOTE — H&P ADULT - PROBLEM SELECTOR PLAN 2
-c/w losartan  -hold lopressor for now given sinus bradycardia and 1st degree block not seen on prior EKG  -repeat ekg in am -c/w losartan  -reduce lopressor from 50 to 25 bid given sinus bradycardia and 1st degree block not seen on prior EKG  -repeat ekg in am

## 2018-06-13 NOTE — ED PROVIDER NOTE - PROGRESS NOTE DETAILS
Zaire: Spoke with orthopedics, no acute surgical intervention at this time. patient must stay NWB and in full extension with KI on at all times. Should not be moving leg. follow up with Dr. Dempsey in 1 week Z[place]: attempted to have patient use crutches. unable to move leg at all. will admit.

## 2018-06-13 NOTE — H&P ADULT - PROBLEM SELECTOR PLAN 5
IMPROVE VTE Individual Risk Assessment    RISK                                                          Points  [] Previous VTE                                           3  [] Thrombophilia                                        2  [] Lower limb paralysis                              2   [] Current Cancer                                       2   [x] Immobilization > 24 hrs                        1  [] ICU/CCU stay > 24 hours                       1  [x] Age > 60                                                   1    IMPROVE VTE Score: 2  lovenox daily

## 2018-06-13 NOTE — ED PROVIDER NOTE - LOCATION
knee/abrasion and deformity with swelling of knee, tender over entire anterior surface of knee. . mild tenderness of ankles/ankle ankle/knee/tender over medial aspect of knee elbow/tender over lateral aspect of knee

## 2018-06-13 NOTE — ED ADULT NURSE NOTE - OBJECTIVE STATEMENT
pt alert oriented x 4 c/o left knee pain after falling today SL placed admission labs sent awaiting x-ray

## 2018-06-13 NOTE — ED ADULT TRIAGE NOTE - CHIEF COMPLAINT QUOTE
Patient brought to [facility] from [facility] by EMS after she tripped over a shopping cart on her knees. She c/o left elbow and bilateral knee pain.

## 2018-06-13 NOTE — ED PROVIDER NOTE - ATTENDING CONTRIBUTION TO CARE
Dr. Thomas:  I have personally performed a face to face bedside history and physical examination of this patient. I have discussed the history, examination, review of systems, assessment and plan of management with the resident. I have reviewed the electronic medical record and amended it to reflect my history, review of systems, physical exam, assessment and plan.    73F presents after mechanical fall with bilateral knee pain, L>R, and left elbow pain.  Denies head trauma/LOC and other injuries.    Exam:  - nad  - rrr  - ctab   - abd soft ntnd Dr. Thomas:  I have personally performed a face to face bedside history and physical examination of this patient. I have discussed the history, examination, review of systems, assessment and plan of management with the resident. I have reviewed the electronic medical record and amended it to reflect my history, review of systems, physical exam, assessment and plan.    73F presents after mechanical fall with bilateral knee pain, L>R, and left elbow pain.  Denies head trauma/LOC and other injuries.    Exam:  - nad  - rrr  - ctab   - abd soft ntnd  - left knee with swelling and deformity, neurovascularly intact distally; +abrasion over left knee; +mild ttp right knee and b/l ankles with no deformity, +mild TTP over left olecranon    A/P  - mechanical fall, eval fracture  - XR left elbow, pelvis, left knee/tib/fib/ankle, right knee/tib/fib/ankle

## 2018-06-13 NOTE — H&P ADULT - NSHPLABSRESULTS_GEN_ALL_CORE
10.3   4.53  )-----------( 205      ( 13 Jun 2018 16:00 )             33.4     06-13    139  |  101  |  14  ----------------------------<  161<H>  3.6   |  26  |  0.87    Ca    9.0      13 Jun 2018 16:20    TPro  6.9  /  Alb  3.8  /  TBili  0.2  /  DBili  x   /  AST  25  /  ALT  17  /  AlkPhos  91  06-13    CAPILLARY BLOOD GLUCOSE        PT/INR - ( 13 Jun 2018 16:20 )   PT: 11.2 SEC;   INR: 0.97          PTT - ( 13 Jun 2018 16:20 )  PTT:29.7 SEC    Vital Signs Last 24 Hrs  T(C): 36.9 (13 Jun 2018 12:47), Max: 36.9 (13 Jun 2018 12:47)  T(F): 98.5 (13 Jun 2018 12:47), Max: 98.5 (13 Jun 2018 12:47)  HR: 62 (13 Jun 2018 22:24) (62 - 79)  BP: 137/72 (13 Jun 2018 22:24) (137/72 - 158/74)  BP(mean): --  RR: 18 (13 Jun 2018 22:24) (18 - 20)  SpO2: 100% (13 Jun 2018 22:24) (99% - 100%)

## 2018-06-13 NOTE — ED PROVIDER NOTE - OBJECTIVE STATEMENT
73F presents after mechanical fall. patient was at [employer] and then was walking out when she tripped and fell. her foot got caught on a shopping cart near the doorway. patient fell onto her knees and left elbow. no head strike, no LOC. Patient is c/o b/l knee and elbow pain. 73F presents after mechanical fall. patient was at [employer] and then was walking out when she tripped and fell. her foot got caught on a shopping cart near the doorway. patient fell onto her knees and left elbow. no head strike, no LOC. Patient is c/o b/l knee pain worse on the left than the right and left elbow pain. Patient unable to ambulate after the fall secondary to pain. Last Tdap 5 years ago. 73F presents after mechanical fall. patient was at [employer] and then was walking out when she tripped and fell. her foot got caught on a shopping cart near the doorway. patient fell onto her knees and left elbow. no head strike, no LOC. Patient is c/o b/l knee pain worse on the left than the right and left elbow pain. Patient unable to ambulate after the fall secondary to pain. Last Tdap 5 years ago.    PMD: Dr. Majano

## 2018-06-13 NOTE — ED ADULT NURSE NOTE - CHIEF COMPLAINT QUOTE
Patient brought to [facility] from [facility] by EMS after she tripped over a shopping cart on her knees. She c/o left elbow and bilateral knee pain.

## 2018-06-13 NOTE — H&P ADULT - PROBLEM SELECTOR PLAN 1
-seen by ortho; no intervention planned at this time  -papito chu knee immobilizer  -NWB  -pain control, PT  -follow up with DR Dempsey as outpt

## 2018-06-13 NOTE — H&P ADULT - NSHPREVIEWOFSYSTEMS_GEN_ALL_CORE
Review of Systems:   CONSTITUTIONAL: No fever, weight loss, or fatigue  EYES: No eye pain, visual disturbances, or discharge  ENMT:  No difficulty hearing, tinnitus, vertigo; No sinus or throat pain  NECK: No pain or stiffness  RESPIRATORY: No cough, wheezing, chills or hemoptysis; No shortness of breath  CARDIOVASCULAR: No chest pain, palpitations, dizziness, or leg swelling  GASTROINTESTINAL: No abdominal or epigastric pain. No nausea, vomiting, or hematemesis; No diarrhea or constipation. No melena or hematochezia.  GENITOURINARY: No dysuria, frequency, hematuria, or incontinence  NEUROLOGICAL: No headaches, memory loss, loss of strength, numbness, or tremors  SKIN: No itching, burning, rashes, or lesions   LYMPH NODES: No enlarged glands  ENDOCRINE: No heat or cold intolerance; No hair loss  MUSCULOSKELETAL: Left knee pain

## 2018-06-14 DIAGNOSIS — D64.9 ANEMIA, UNSPECIFIED: ICD-10-CM

## 2018-06-14 LAB
BUN SERPL-MCNC: 11 MG/DL — SIGNIFICANT CHANGE UP (ref 7–23)
CALCIUM SERPL-MCNC: 8.8 MG/DL — SIGNIFICANT CHANGE UP (ref 8.4–10.5)
CHLORIDE SERPL-SCNC: 102 MMOL/L — SIGNIFICANT CHANGE UP (ref 98–107)
CO2 SERPL-SCNC: 29 MMOL/L — SIGNIFICANT CHANGE UP (ref 22–31)
CREAT SERPL-MCNC: 0.87 MG/DL — SIGNIFICANT CHANGE UP (ref 0.5–1.3)
GLUCOSE BLDC GLUCOMTR-MCNC: 137 MG/DL — HIGH (ref 70–99)
GLUCOSE BLDC GLUCOMTR-MCNC: 160 MG/DL — HIGH (ref 70–99)
GLUCOSE SERPL-MCNC: 98 MG/DL — SIGNIFICANT CHANGE UP (ref 70–99)
HBA1C BLD-MCNC: 7.7 % — HIGH (ref 4–5.6)
HCT VFR BLD CALC: 31.3 % — LOW (ref 34.5–45)
HGB BLD-MCNC: 9.6 G/DL — LOW (ref 11.5–15.5)
MCHC RBC-ENTMCNC: 22.9 PG — LOW (ref 27–34)
MCHC RBC-ENTMCNC: 30.7 % — LOW (ref 32–36)
MCV RBC AUTO: 74.7 FL — LOW (ref 80–100)
NRBC # FLD: 0 — SIGNIFICANT CHANGE UP
PLATELET # BLD AUTO: 187 K/UL — SIGNIFICANT CHANGE UP (ref 150–400)
PMV BLD: 9.3 FL — SIGNIFICANT CHANGE UP (ref 7–13)
POTASSIUM SERPL-MCNC: 3.5 MMOL/L — SIGNIFICANT CHANGE UP (ref 3.5–5.3)
POTASSIUM SERPL-SCNC: 3.5 MMOL/L — SIGNIFICANT CHANGE UP (ref 3.5–5.3)
RBC # BLD: 4.19 M/UL — SIGNIFICANT CHANGE UP (ref 3.8–5.2)
RBC # FLD: 14.4 % — SIGNIFICANT CHANGE UP (ref 10.3–14.5)
SODIUM SERPL-SCNC: 142 MMOL/L — SIGNIFICANT CHANGE UP (ref 135–145)
WBC # BLD: 4.99 K/UL — SIGNIFICANT CHANGE UP (ref 3.8–10.5)
WBC # FLD AUTO: 4.99 K/UL — SIGNIFICANT CHANGE UP (ref 3.8–10.5)

## 2018-06-14 PROCEDURE — 99233 SBSQ HOSP IP/OBS HIGH 50: CPT

## 2018-06-14 PROCEDURE — 93010 ELECTROCARDIOGRAM REPORT: CPT

## 2018-06-14 RX ORDER — BENZOCAINE AND MENTHOL 5; 1 G/100ML; G/100ML
1 LIQUID ORAL
Qty: 0 | Refills: 0 | Status: DISCONTINUED | OUTPATIENT
Start: 2018-06-14 | End: 2018-06-16

## 2018-06-14 RX ORDER — VALSARTAN 80 MG/1
160 TABLET ORAL DAILY
Qty: 0 | Refills: 0 | Status: DISCONTINUED | OUTPATIENT
Start: 2018-06-13 | End: 2018-06-16

## 2018-06-14 RX ORDER — SENNA PLUS 8.6 MG/1
2 TABLET ORAL AT BEDTIME
Qty: 0 | Refills: 0 | Status: DISCONTINUED | OUTPATIENT
Start: 2018-06-14 | End: 2018-06-15

## 2018-06-14 RX ORDER — VALSARTAN 80 MG/1
1 TABLET ORAL
Qty: 0 | Refills: 0 | COMMUNITY

## 2018-06-14 RX ORDER — ATORVASTATIN CALCIUM 80 MG/1
80 TABLET, FILM COATED ORAL AT BEDTIME
Qty: 0 | Refills: 0 | Status: DISCONTINUED | OUTPATIENT
Start: 2018-06-13 | End: 2018-06-16

## 2018-06-14 RX ORDER — METOPROLOL TARTRATE 50 MG
25 TABLET ORAL
Qty: 0 | Refills: 0 | Status: DISCONTINUED | OUTPATIENT
Start: 2018-06-14 | End: 2018-06-16

## 2018-06-14 RX ORDER — OMEPRAZOLE 10 MG/1
1 CAPSULE, DELAYED RELEASE ORAL
Qty: 0 | Refills: 0 | COMMUNITY

## 2018-06-14 RX ORDER — PREGABALIN 225 MG/1
0 CAPSULE ORAL
Qty: 0 | Refills: 0 | COMMUNITY

## 2018-06-14 RX ORDER — METOPROLOL TARTRATE 50 MG
1 TABLET ORAL
Qty: 0 | Refills: 0 | COMMUNITY

## 2018-06-14 RX ORDER — DOCUSATE SODIUM 100 MG
100 CAPSULE ORAL THREE TIMES A DAY
Qty: 0 | Refills: 0 | Status: DISCONTINUED | OUTPATIENT
Start: 2018-06-14 | End: 2018-06-15

## 2018-06-14 RX ORDER — SENNA PLUS 8.6 MG/1
2 TABLET ORAL AT BEDTIME
Qty: 0 | Refills: 0 | Status: DISCONTINUED | OUTPATIENT
Start: 2018-06-14 | End: 2018-06-14

## 2018-06-14 RX ADMIN — Medication 25 MILLIGRAM(S): at 17:00

## 2018-06-14 RX ADMIN — OXYCODONE AND ACETAMINOPHEN 1 TABLET(S): 5; 325 TABLET ORAL at 12:10

## 2018-06-14 RX ADMIN — OXYCODONE AND ACETAMINOPHEN 1 TABLET(S): 5; 325 TABLET ORAL at 06:39

## 2018-06-14 RX ADMIN — ENOXAPARIN SODIUM 40 MILLIGRAM(S): 100 INJECTION SUBCUTANEOUS at 00:14

## 2018-06-14 RX ADMIN — OXYCODONE AND ACETAMINOPHEN 1 TABLET(S): 5; 325 TABLET ORAL at 00:36

## 2018-06-14 RX ADMIN — OXYCODONE AND ACETAMINOPHEN 1 TABLET(S): 5; 325 TABLET ORAL at 20:43

## 2018-06-14 RX ADMIN — INSULIN GLARGINE 30 UNIT(S): 100 INJECTION, SOLUTION SUBCUTANEOUS at 00:14

## 2018-06-14 RX ADMIN — OXYCODONE AND ACETAMINOPHEN 1 TABLET(S): 5; 325 TABLET ORAL at 06:09

## 2018-06-14 RX ADMIN — Medication 25 MILLIGRAM(S): at 06:02

## 2018-06-14 RX ADMIN — Medication 1: at 12:12

## 2018-06-14 RX ADMIN — BENZOCAINE AND MENTHOL 1 LOZENGE: 5; 1 LIQUID ORAL at 21:15

## 2018-06-14 RX ADMIN — OXYCODONE AND ACETAMINOPHEN 1 TABLET(S): 5; 325 TABLET ORAL at 21:43

## 2018-06-14 RX ADMIN — OXYCODONE AND ACETAMINOPHEN 1 TABLET(S): 5; 325 TABLET ORAL at 13:05

## 2018-06-14 RX ADMIN — ENOXAPARIN SODIUM 40 MILLIGRAM(S): 100 INJECTION SUBCUTANEOUS at 23:16

## 2018-06-14 RX ADMIN — Medication 1: at 17:00

## 2018-06-14 RX ADMIN — ATORVASTATIN CALCIUM 80 MILLIGRAM(S): 80 TABLET, FILM COATED ORAL at 21:15

## 2018-06-14 RX ADMIN — INSULIN GLARGINE 30 UNIT(S): 100 INJECTION, SOLUTION SUBCUTANEOUS at 21:16

## 2018-06-14 RX ADMIN — OXYCODONE AND ACETAMINOPHEN 1 TABLET(S): 5; 325 TABLET ORAL at 00:06

## 2018-06-14 RX ADMIN — VALSARTAN 160 MILLIGRAM(S): 80 TABLET ORAL at 06:02

## 2018-06-14 NOTE — PROGRESS NOTE ADULT - PROBLEM SELECTOR PLAN 1
-seen by ortho; no intervention planned at this time  -papito chu knee immobilizer  -NWB  -pain control, PT  -follow up with DR Dempsey as outpt -seen by ortho; no intervention planned at this time  -papito chu knee immobilizer  -NWB  -pain control as needed with percocet  -follow up with DR Dempsey as outpt  - f/u MINDA regarding subacute rehab placement

## 2018-06-14 NOTE — PROGRESS NOTE ADULT - SUBJECTIVE AND OBJECTIVE BOX
Ms Cristobal is known to me for a long time.She is being rx in my office for Hypertension,Dyslipidemia,and for the past one year for B 12 and folic acid anemia by the GI consultant Dr. Rafi Henao. Her Diabetesmis fairly stable with HGB A1C hovering around 7.5 to 8 without any serious microproteinuria. She has not been good in going to the ophthalmologist for regular examination. Lately her HCT/Hb seems to drop and I relay this information to Dr. Hneao to make sure she is not having a concomittant GI bleeding.    Ezekiel Majano   -[id number].

## 2018-06-14 NOTE — PROGRESS NOTE ADULT - PROBLEM SELECTOR PLAN 2
-c/w losartan  -reduce lopressor from 50 to 25 bid given sinus bradycardia and 1st degree block not seen on prior EKG  -repeat ekg in am -c/w losartan  -continue lower dose of metoprolol 25 bid given sinus bradycardia and 1st degree block not seen on prior EKG

## 2018-06-14 NOTE — PROGRESS NOTE ADULT - PROBLEM SELECTOR PLAN 5
IMPROVE VTE Individual Risk Assessment    RISK                                                          Points  [] Previous VTE                                           3  [] Thrombophilia                                        2  [] Lower limb paralysis                              2   [] Current Cancer                                       2   [x] Immobilization > 24 hrs                        1  [] ICU/CCU stay > 24 hours                       1  [x] Age > 60                                                   1    IMPROVE VTE Score: 2  lovenox daily Chronic and stable. Patient reports being diagnosed with pernicious anemia. She follows up with her oupatient hematology for "shots"  - no inpatient w/u indicated at this time

## 2018-06-14 NOTE — PROGRESS NOTE ADULT - ASSESSMENT
72 yo f with left patellar fracture following mechanical fall 74 yo woman admitted for left patellar fracture following mechanical fall, now deemed candidate for rehab placement.

## 2018-06-14 NOTE — PROGRESS NOTE ADULT - SUBJECTIVE AND OBJECTIVE BOX
No acute events overnight. Pain controlled.     PE:  Vital Signs Last 24 Hrs  T(C): 36.7 (14 Jun 2018 05:59), Max: 36.9 (13 Jun 2018 12:47)  T(F): 98 (14 Jun 2018 05:59), Max: 98.5 (13 Jun 2018 12:47)  HR: 60 (14 Jun 2018 05:59) (60 - 79)  BP: 147/73 (14 Jun 2018 05:59) (137/72 - 158/74)  BP(mean): --  RR: 18 (14 Jun 2018 05:59) (18 - 20)  SpO2: 100% (14 Jun 2018 05:59) (99% - 100%)    Exam:  Gen: NAD  LLE:  Knee in bulky chu dressing and knee immobilizer   Motor: 5/5 EHL/FHL/TA/Gastrocnemius  Sensory: SILT DP/SP/S/S/T nerve distributions  Vascular: 2+ Dorsalis Pedis pulse      Labs:                        9.6    4.99  )-----------( 187      ( 14 Jun 2018 05:35 )             31.3   06-14    142  |  102  |  11  ----------------------------<  98  3.5   |  29  |  0.87    Ca    8.8      14 Jun 2018 05:35    TPro  6.9  /  Alb  3.8  /  TBili  0.2  /  DBili  x   /  AST  25  /  ALT  17  /  AlkPhos  91  06-13        Assessment/Plan:  73yFemale who p/w L comminuted patella fracture. Encouraged to ambulate and get out of bed. Able to ambulate a few short steps, will need adequate pain control and PT.     -pain control  -PT  -WBAT in bulky chu knee immobilizer   -OOB  -DVT ppx  -dispo plan

## 2018-06-14 NOTE — PROGRESS NOTE ADULT - SUBJECTIVE AND OBJECTIVE BOX
Patient is a 73y old  Female who presents with a chief complaint of fall (13 Jun 2018 22:37)    SUBJECTIVE / OVERNIGHT EVENTS:      MEDICATIONS  (STANDING):  atorvastatin 80 milliGRAM(s) Oral at bedtime  dextrose 5%. 1000 milliLiter(s) (50 mL/Hr) IV Continuous <Continuous>  dextrose 50% Injectable 12.5 Gram(s) IV Push once  dextrose 50% Injectable 25 Gram(s) IV Push once  dextrose 50% Injectable 25 Gram(s) IV Push once  enoxaparin Injectable 40 milliGRAM(s) SubCutaneous every 24 hours  insulin glargine Injectable (LANTUS) 30 Unit(s) SubCutaneous at bedtime  insulin lispro (HumaLOG) corrective regimen sliding scale   SubCutaneous three times a day before meals  insulin lispro (HumaLOG) corrective regimen sliding scale   SubCutaneous at bedtime  metoprolol tartrate 25 milliGRAM(s) Oral two times a day  senna 2 Tablet(s) Oral at bedtime  valsartan 160 milliGRAM(s) Oral daily    MEDICATIONS  (PRN):  dextrose 40% Gel 15 Gram(s) Oral once PRN Blood Glucose LESS THAN 70 milliGRAM(s)/deciliter  docusate sodium 100 milliGRAM(s) Oral three times a day PRN Constipation  glucagon  Injectable 1 milliGRAM(s) IntraMuscular once PRN Glucose LESS THAN 70 milligrams/deciliter  oxyCODONE    5 mG/acetaminophen 325 mG 1 Tablet(s) Oral every 6 hours PRN moderate and severe pain  senna 2 Tablet(s) Oral at bedtime PRN Constipation      Vital Signs Last 24 Hrs  T(C): 36.7 (14 Jun 2018 05:59), Max: 36.9 (13 Jun 2018 12:47)  T(F): 98 (14 Jun 2018 05:59), Max: 98.5 (13 Jun 2018 12:47)  HR: 60 (14 Jun 2018 05:59) (60 - 79)  BP: 147/73 (14 Jun 2018 05:59) (137/72 - 158/74)  BP(mean): --  RR: 18 (14 Jun 2018 05:59) (18 - 20)  SpO2: 100% (14 Jun 2018 05:59) (99% - 100%)  CAPILLARY BLOOD GLUCOSE      POCT Blood Glucose.: 115 mg/dL (14 Jun 2018 06:29)  POCT Blood Glucose.: 139 mg/dL (13 Jun 2018 23:53)      PHYSICAL EXAM  GENERAL: NAD, well-developed  HEAD:  Atraumatic, Normocephalic  EYES: EOMI, PERRLA, conjunctiva and sclera clear  NECK: Supple, No JVD  CHEST/LUNG: Clear to auscultation bilaterally; No wheeze  HEART: Regular rate and rhythm; No murmurs, rubs, or gallops  ABDOMEN: Soft, Nontender, Nondistended; Bowel sounds present  EXTREMITIES:  2+ Peripheral Pulses, No clubbing, cyanosis, or edema  PSYCH: AAOx3  SKIN: No rashes or lesions    LABS:                        9.6    4.99  )-----------( 187      ( 14 Jun 2018 05:35 )             31.3     06-14    142  |  102  |  11  ----------------------------<  98  3.5   |  29  |  0.87    Ca    8.8      14 Jun 2018 05:35    TPro  6.9  /  Alb  3.8  /  TBili  0.2  /  DBili  x   /  AST  25  /  ALT  17  /  AlkPhos  91  06-13    PT/INR - ( 13 Jun 2018 16:20 )   PT: 11.2 SEC;   INR: 0.97          PTT - ( 13 Jun 2018 16:20 )  PTT:29.7 SEC          RADIOLOGY & ADDITIONAL TESTS:    Imaging Personally Reviewed:  Consultant(s) Notes Reviewed:    Care Discussed with Consultants/Other Providers: Patient is a 73y old  Female who presents with a chief complaint of fall (13 Jun 2018 22:37)    SUBJECTIVE / OVERNIGHT EVENTS:  Patient seen and examined. Pt complaining of pain from her knee. Otherwise no other complaints. She participated with PT earlier this morning and is agreeable to go to rehab.    MEDICATIONS  (STANDING):  atorvastatin 80 milliGRAM(s) Oral at bedtime  dextrose 5%. 1000 milliLiter(s) (50 mL/Hr) IV Continuous <Continuous>  dextrose 50% Injectable 12.5 Gram(s) IV Push once  dextrose 50% Injectable 25 Gram(s) IV Push once  dextrose 50% Injectable 25 Gram(s) IV Push once  enoxaparin Injectable 40 milliGRAM(s) SubCutaneous every 24 hours  insulin glargine Injectable (LANTUS) 30 Unit(s) SubCutaneous at bedtime  insulin lispro (HumaLOG) corrective regimen sliding scale   SubCutaneous three times a day before meals  insulin lispro (HumaLOG) corrective regimen sliding scale   SubCutaneous at bedtime  metoprolol tartrate 25 milliGRAM(s) Oral two times a day  senna 2 Tablet(s) Oral at bedtime  valsartan 160 milliGRAM(s) Oral daily    MEDICATIONS  (PRN):  dextrose 40% Gel 15 Gram(s) Oral once PRN Blood Glucose LESS THAN 70 milliGRAM(s)/deciliter  docusate sodium 100 milliGRAM(s) Oral three times a day PRN Constipation  glucagon  Injectable 1 milliGRAM(s) IntraMuscular once PRN Glucose LESS THAN 70 milligrams/deciliter  oxyCODONE    5 mG/acetaminophen 325 mG 1 Tablet(s) Oral every 6 hours PRN moderate and severe pain  senna 2 Tablet(s) Oral at bedtime PRN Constipation      Vital Signs Last 24 Hrs  T(C): 36.7 (14 Jun 2018 05:59), Max: 36.9 (13 Jun 2018 12:47)  T(F): 98 (14 Jun 2018 05:59), Max: 98.5 (13 Jun 2018 12:47)  HR: 60 (14 Jun 2018 05:59) (60 - 79)  BP: 147/73 (14 Jun 2018 05:59) (137/72 - 158/74)  BP(mean): --  RR: 18 (14 Jun 2018 05:59) (18 - 20)  SpO2: 100% (14 Jun 2018 05:59) (99% - 100%)      CAPILLARY BLOOD GLUCOSE  POCT Blood Glucose.: 115 mg/dL (14 Jun 2018 06:29)  POCT Blood Glucose.: 139 mg/dL (13 Jun 2018 23:53)      PHYSICAL EXAM  GENERAL: NAD, well-developed  CHEST/LUNG: Clear to auscultation bilaterally; No wheeze  HEART: Regular rate and rhythm; No murmurs, rubs, or gallops  ABDOMEN: Soft, Nontender, Nondistended; Bowel sounds present  EXTREMITIES:  2+ Peripheral Pulses, No clubbing, cyanosis. LLE wrapped in ACE bandage and immobilizer brace in place  PSYCH: AAOx3        LABS:                        9.6    4.99  )-----------( 187      ( 14 Jun 2018 05:35 )             31.3     06-14    142  |  102  |  11  ----------------------------<  98  3.5   |  29  |  0.87    Ca    8.8      14 Jun 2018 05:35    TPro  6.9  /  Alb  3.8  /  TBili  0.2  /  DBili  x   /  AST  25  /  ALT  17  /  AlkPhos  91  06-13    PT/INR - ( 13 Jun 2018 16:20 )   PT: 11.2 SEC;   INR: 0.97          PTT - ( 13 Jun 2018 16:20 )  PTT:29.7 SEC        Consultant(s) Notes Reviewed:  Yes  Care Discussed with Consultants/Other Providers: Yes

## 2018-06-14 NOTE — PROGRESS NOTE ADULT - PROBLEM SELECTOR PLAN 3
-ISS  -reports 35 lantus qhs  -will email pharmacist -ISS  - continue lantus 30 units at bedtime  - FSG before meals TID and at bedtime

## 2018-06-15 ENCOUNTER — TRANSCRIPTION ENCOUNTER (OUTPATIENT)
Age: 73
End: 2018-06-15

## 2018-06-15 LAB
GLUCOSE BLDC GLUCOMTR-MCNC: 177 MG/DL — HIGH (ref 70–99)
GLUCOSE BLDC GLUCOMTR-MCNC: 178 MG/DL — HIGH (ref 70–99)
GLUCOSE BLDC GLUCOMTR-MCNC: 89 MG/DL — SIGNIFICANT CHANGE UP (ref 70–99)
GLUCOSE BLDC GLUCOMTR-MCNC: 92 MG/DL — SIGNIFICANT CHANGE UP (ref 70–99)

## 2018-06-15 PROCEDURE — 99233 SBSQ HOSP IP/OBS HIGH 50: CPT

## 2018-06-15 RX ORDER — METOPROLOL TARTRATE 50 MG
1 TABLET ORAL
Qty: 0 | Refills: 0 | DISCHARGE
Start: 2018-06-15

## 2018-06-15 RX ORDER — DOCUSATE SODIUM 100 MG
100 CAPSULE ORAL THREE TIMES A DAY
Qty: 0 | Refills: 0 | Status: DISCONTINUED | OUTPATIENT
Start: 2018-06-15 | End: 2018-06-16

## 2018-06-15 RX ORDER — METOPROLOL TARTRATE 50 MG
1 TABLET ORAL
Qty: 0 | Refills: 0 | COMMUNITY

## 2018-06-15 RX ORDER — POLYETHYLENE GLYCOL 3350 17 G/17G
17 POWDER, FOR SOLUTION ORAL DAILY
Qty: 0 | Refills: 0 | Status: DISCONTINUED | OUTPATIENT
Start: 2018-06-15 | End: 2018-06-16

## 2018-06-15 RX ORDER — VALSARTAN 80 MG/1
1 TABLET ORAL
Qty: 0 | Refills: 0 | DISCHARGE
Start: 2018-06-15

## 2018-06-15 RX ORDER — MECLIZINE HCL 12.5 MG
1 TABLET ORAL
Qty: 0 | Refills: 0 | COMMUNITY

## 2018-06-15 RX ORDER — ROSUVASTATIN CALCIUM 5 MG/1
1 TABLET ORAL
Qty: 0 | Refills: 0 | COMMUNITY

## 2018-06-15 RX ORDER — DOCUSATE SODIUM 100 MG
1 CAPSULE ORAL
Qty: 0 | Refills: 0 | DISCHARGE
Start: 2018-06-15

## 2018-06-15 RX ORDER — ATORVASTATIN CALCIUM 80 MG/1
1 TABLET, FILM COATED ORAL
Qty: 0 | Refills: 0 | DISCHARGE
Start: 2018-06-15

## 2018-06-15 RX ORDER — OXYCODONE AND ACETAMINOPHEN 5; 325 MG/1; MG/1
1 TABLET ORAL
Qty: 0 | Refills: 0 | DISCHARGE
Start: 2018-06-15

## 2018-06-15 RX ORDER — SENNA PLUS 8.6 MG/1
2 TABLET ORAL AT BEDTIME
Qty: 0 | Refills: 0 | Status: DISCONTINUED | OUTPATIENT
Start: 2018-06-15 | End: 2018-06-16

## 2018-06-15 RX ORDER — SENNA PLUS 8.6 MG/1
2 TABLET ORAL
Qty: 0 | Refills: 0 | DISCHARGE
Start: 2018-06-15

## 2018-06-15 RX ORDER — BENZOCAINE AND MENTHOL 5; 1 G/100ML; G/100ML
1 LIQUID ORAL
Qty: 0 | Refills: 0 | DISCHARGE
Start: 2018-06-15

## 2018-06-15 RX ADMIN — OXYCODONE AND ACETAMINOPHEN 1 TABLET(S): 5; 325 TABLET ORAL at 12:44

## 2018-06-15 RX ADMIN — INSULIN GLARGINE 30 UNIT(S): 100 INJECTION, SOLUTION SUBCUTANEOUS at 22:56

## 2018-06-15 RX ADMIN — OXYCODONE AND ACETAMINOPHEN 1 TABLET(S): 5; 325 TABLET ORAL at 07:17

## 2018-06-15 RX ADMIN — SENNA PLUS 2 TABLET(S): 8.6 TABLET ORAL at 22:56

## 2018-06-15 RX ADMIN — Medication 25 MILLIGRAM(S): at 18:17

## 2018-06-15 RX ADMIN — OXYCODONE AND ACETAMINOPHEN 1 TABLET(S): 5; 325 TABLET ORAL at 01:23

## 2018-06-15 RX ADMIN — ENOXAPARIN SODIUM 40 MILLIGRAM(S): 100 INJECTION SUBCUTANEOUS at 23:00

## 2018-06-15 RX ADMIN — OXYCODONE AND ACETAMINOPHEN 1 TABLET(S): 5; 325 TABLET ORAL at 19:00

## 2018-06-15 RX ADMIN — VALSARTAN 160 MILLIGRAM(S): 80 TABLET ORAL at 05:30

## 2018-06-15 RX ADMIN — Medication 100 MILLIGRAM(S): at 22:56

## 2018-06-15 RX ADMIN — OXYCODONE AND ACETAMINOPHEN 1 TABLET(S): 5; 325 TABLET ORAL at 12:17

## 2018-06-15 RX ADMIN — OXYCODONE AND ACETAMINOPHEN 1 TABLET(S): 5; 325 TABLET ORAL at 18:17

## 2018-06-15 RX ADMIN — OXYCODONE AND ACETAMINOPHEN 1 TABLET(S): 5; 325 TABLET ORAL at 02:23

## 2018-06-15 RX ADMIN — ATORVASTATIN CALCIUM 80 MILLIGRAM(S): 80 TABLET, FILM COATED ORAL at 22:56

## 2018-06-15 RX ADMIN — Medication 100 MILLIGRAM(S): at 14:13

## 2018-06-15 RX ADMIN — Medication 1: at 12:01

## 2018-06-15 RX ADMIN — POLYETHYLENE GLYCOL 3350 17 GRAM(S): 17 POWDER, FOR SOLUTION ORAL at 12:01

## 2018-06-15 RX ADMIN — Medication 25 MILLIGRAM(S): at 05:30

## 2018-06-15 NOTE — DISCHARGE NOTE ADULT - HOSPITAL COURSE
Closed displaced fracture of left patella, unspecified fracture morphology, sequela.     -seen by ortho; no intervention planned at this time  -bulky chu knee immobilizer  -NWB  -pain control, PT  -follow up with DR Dempsey as outpt.      Essential hypertension.    -c/w losartan  -reduce lopressor from 50 to 25 bid given sinus bradycardia and 1st degree block not seen on prior EKG  -repeat ekg in am. -c/w losartan  -hold lopressor for now given sinus bradycardia and 1st degree block not seen on prior EKG  -repeat ekg in am      Type 2 diabetes mellitus with complication, unspecified whether long term insulin use.     -ISS  -reports 35 lantus qhs  -will email pharmacist.     Hyperlipidemia, unspecified hyperlipidemia type.    c/w statin. 74 yo f with left patellar fracture following mechanical fall  Hospital Course    Closed displaced fracture of left patella, unspecified fracture morphology, sequela- XRay Pelvis, Knee- Acute comminuted intra-articular left patellar fracture with peripatellar soft tissue swelling and large knee joint effusion. Ortho consulted- no intervention planned at this time, bulky chu knee immobilizer, NWB, pain control, PT, follow up with Dr Dempsey as out pt.     Essential hypertension- c/w Losartan, repeated EKG, changed Lopressor to 25 BID given sinus bradycardia and 1st degree block not seen on prior EKG    Type 2 diabetes mellitus with complication- ISS, reports 35 lantus qhs. HgA1C- 7.7%    Hyperlipidemia, unspecified hyperlipidemia type - c/w statin.

## 2018-06-15 NOTE — PROGRESS NOTE ADULT - PROBLEM SELECTOR PLAN 2
-c/w losartan  -continue lower dose of metoprolol 25 bid given sinus bradycardia and 1st degree block not seen on prior EKG

## 2018-06-15 NOTE — DISCHARGE NOTE ADULT - CARE PROVIDER_API CALL
Abhinav Dempsey), Orthopaedic Surgery  [address] [place] [address]  Phone: [phone]  Fax: [phone]

## 2018-06-15 NOTE — PROGRESS NOTE ADULT - ASSESSMENT
72 yo woman admitted for left patellar fracture following mechanical fall, now deemed candidate for rehab placement.

## 2018-06-15 NOTE — PROGRESS NOTE ADULT - SUBJECTIVE AND OBJECTIVE BOX
Orthopedic Surgery Progress Note  S: Patient continues to have pain.  No nausea, vomiting, chest pain, shortness of breath, lightheadedness, or dizziness. Ambulated with PT yesterday.    O:  Vital Signs Last 24 Hrs  T(C): 36.6 (15 Leobardo 2018 05:21), Max: 36.7 (14 Jun 2018 13:24)  T(F): 97.9 (15 Leobardo 2018 05:21), Max: 98.1 (14 Jun 2018 13:24)  HR: 62 (15 Leobardo 2018 05:21) (62 - 68)  BP: 129/66 (15 Leobardo 2018 05:21) (117/72 - 139/67)  RR: 17 (15 Leobardo 2018 05:21) (16 - 18)  SpO2: 99% (15 Leobardo 2018 05:21) (99% - 100%)    Gen: NAD  LLE  BJKI in place  fires EHL/FHL/TA/GS  sensation intact at foot  WWP distally                        9.6    4.99  )-----------( 187      ( 14 Jun 2018 05:35 )             31.3                         10.3   4.53  )-----------( 205      ( 13 Jun 2018 16:00 )             33.4     06-14    142  |  102  |  11  ----------------------------<  98  3.5   |  29  |  0.8    PT/INR - ( 13 Jun 2018 16:20 )   PT: 11.2 SEC;   INR: 0.97        PTT - ( 13 Jun 2018 16:20 )  PTT:29.7 SEC    73yFemale who p/w L comminuted patella fracture. Encouraged to ambulate and get out of bed. Able to ambulate a few short steps, will need adequate pain control and PT.     -pain control  -PT  -WBAT in bulky chu knee immobilizer   -OOB  -DVT ppx  -dispo plan: home with home PT vs. rehab    Ry Downey MD

## 2018-06-15 NOTE — DISCHARGE NOTE ADULT - PLAN OF CARE
resolution Wear bulky chu knee immobilizer as directed. Weight bearing as tolerated. Percocet as needed for pain. PERCOCET IS NOT A CHRONIC MED, PLEASE WEAN OFF AS you IMPROVE WITH THERAPY. Follow up with Dr. Dempsey upon discharge from rehab. Continue metoprolol and valsartan continue with diabetes medications as prescribed. Continue to check finger sticks at home as directed. Follow up with your PCP or endocrinologist upon discharge from rehab. stable - You were seen by Ortho in the hospital  - Wear bulky chu knee immobilizer as directed. Weight bearing as tolerated.   - Percocet as needed for pain. PERCOCET IS NOT A CHRONIC MED, PLEASE WEAN OFF AS you IMPROVE WITH THERAPY.   - Follow up with Dr. Dempsey upon discharge from Rehab. optimal BP control - Continue Metoprolol dose decreased to 25 mg oral 2xdaily and Valsartan 160mg oral daily  - follow up with PCP as outpatient for further management and treatment - continue with diabetes medications as prescribed. Continue to check finger sticks at home as directed. - Follow up with your PCP or endocrinologist upon discharge from rehab. - follow up with Heme as outpatient for further management

## 2018-06-15 NOTE — PROGRESS NOTE ADULT - PROBLEM SELECTOR PROBLEM 3
Type 2 diabetes mellitus with complication, unspecified whether long term insulin use
Type 2 diabetes mellitus with complication, unspecified whether long term insulin use

## 2018-06-15 NOTE — DISCHARGE NOTE ADULT - MEDICATION SUMMARY - MEDICATIONS TO CHANGE
I will SWITCH the dose or number of times a day I take the medications listed below when I get home from the hospital:    metoprolol tartrate 50 mg oral tablet  -- 1 tab(s) by mouth 2 times a day    metoprolol succinate 50 mg oral tablet, extended release  -- 1 tab(s) by mouth once a day I will SWITCH the dose or number of times a day I take the medications listed below when I get home from the hospital:    metoprolol tartrate 50 mg oral tablet  -- 1 tab(s) by mouth 2 times a day    insulin glargine  -- 35 unit(s) subcutaneous once a day (at bedtime)    metoprolol succinate 50 mg oral tablet, extended release  -- 1 tab(s) by mouth once a day

## 2018-06-15 NOTE — PROGRESS NOTE ADULT - PROBLEM SELECTOR PLAN 6
IMPROVE VTE Individual Risk Assessment    RISK                                                          Points  [] Previous VTE                                           3  [] Thrombophilia                                        2  [] Lower limb paralysis                              2   [] Current Cancer                                       2   [x] Immobilization > 24 hrs                        1  [] ICU/CCU stay > 24 hours                       1  [x] Age > 60                                                   1    IMPROVE VTE Score: 2  lovenox daily    DISPO: Patient medically stable to be discharged to rehab, pending bed availability and insurance authorization. Spent 35 min coordinating discharge plan
IMPROVE VTE Individual Risk Assessment    RISK                                                          Points  [] Previous VTE                                           3  [] Thrombophilia                                        2  [] Lower limb paralysis                              2   [] Current Cancer                                       2   [x] Immobilization > 24 hrs                        1  [] ICU/CCU stay > 24 hours                       1  [x] Age > 60                                                   1    IMPROVE VTE Score: 2  lovenox daily

## 2018-06-15 NOTE — PROGRESS NOTE ADULT - PROBLEM SELECTOR PLAN 5
Chronic and stable. Patient reports being diagnosed with pernicious anemia. She follows up with her outpatient hematology for "shots"  - no inpatient w/u indicated at this time

## 2018-06-15 NOTE — DISCHARGE NOTE ADULT - MEDICATION SUMMARY - MEDICATIONS TO STOP TAKING
I will STOP taking the medications listed below when I get home from the hospital:    valsartan 160 mg oral tablet  -- 1 tab(s) by mouth once a day    insulin glargine  -- 15 unit(s) subcutaneous once a day (at bedtime)    insulin lispro 100 units/mL subcutaneous solution  -- 5 unit(s) subcutaneous 3 times a day (before meals)    lidocaine 5% topical film  -- Apply on skin to affected area once a day    polyethylene glycol 3350 oral powder for reconstitution  -- 17 gram(s) by mouth 2 times a day    meclizine 25 mg oral tablet  -- 1 tab(s) by mouth 3 times a day, As Needed I will STOP taking the medications listed below when I get home from the hospital:    Crestor 20 mg oral tablet  -- 1 tab(s) by mouth once a day (at bedtime)    insulin glargine  -- 15 unit(s) subcutaneous once a day (at bedtime)    insulin lispro 100 units/mL subcutaneous solution  -- 5 unit(s) subcutaneous 3 times a day (before meals)    lidocaine 5% topical film  -- Apply on skin to affected area once a day    polyethylene glycol 3350 oral powder for reconstitution  -- 17 gram(s) by mouth 2 times a day    meclizine 25 mg oral tablet  -- 1 tab(s) by mouth 3 times a day, As Needed    valsartan-hydrochlorothiazide 160mg-25mg oral tablet  -- 1 tab(s) by mouth once a day

## 2018-06-15 NOTE — PROGRESS NOTE ADULT - PROBLEM SELECTOR PLAN 1
-seen by ortho; no intervention planned at this time  -bulky chu knee immobilizer  -WBAT  -pain control as needed with percocet. Added standing bowel regimen for noted constipation  -follow up with DR Ke up outpt  - f/u MINDA regarding subacute rehab placement

## 2018-06-15 NOTE — DISCHARGE NOTE ADULT - MEDICATION SUMMARY - MEDICATIONS TO TAKE
I will START or STAY ON the medications listed below when I get home from the hospital:    oxyCODONE-acetaminophen 5 mg-325 mg oral tablet  -- 1 tab(s) by mouth every 6 hours, As needed, moderate and severe pain  -- Indication: For Closed displaced fracture of left patella, unspecified fracture morphology, sequela    valsartan 160 mg oral tablet  -- 1 tab(s) by mouth once a day  -- Indication: For Essential hypertension    insulin glargine  -- 30 unit(s) subcutaneous once a day (at bedtime)  -- Indication: For Type 2 diabetes mellitus with complication, unspecified whether long term insulin use    metFORMIN 1000 mg oral tablet, extended release  -- 1 tab(s) by mouth once a day  -- Indication: For Type 2 diabetes mellitus with complication, unspecified whether long term insulin use    atorvastatin 80 mg oral tablet  -- 1 tab(s) by mouth once a day (at bedtime)  -- Indication: For High cholesterol    metoprolol tartrate 25 mg oral tablet  -- 1 tab(s) by mouth 2 times a day  -- Indication: For Essential hypertension    Linzess 72 mcg oral capsule  -- 1 cap(s) by mouth once a day  -- Indication: For Constipation/IBS    senna oral tablet  -- 2 tab(s) by mouth once a day (at bedtime)  -- Indication: For bowel health    docusate sodium 100 mg oral capsule  -- 1 cap(s) by mouth 3 times a day  -- Indication: For bowel health    benzocaine-menthol 15 mg-3.6 mg mucous membrane lozenge  -- 1 lozenge mucous membrane 4 times a day, As Needed for sore throat  -- Indication: For sore throat    omeprazole 40 mg oral delayed release capsule  -- 1 cap(s) by mouth once a day  -- Indication: For GERD    cyanocobalamin 100 mcg/mL injectable solution  -- Indication: For supplement

## 2018-06-15 NOTE — DISCHARGE NOTE ADULT - PATIENT PORTAL LINK FT
You can access the [email or web address] Patient Portal, offered by [facility], by registering with the following website: [email or web address]

## 2018-06-15 NOTE — PROGRESS NOTE ADULT - PROBLEM SELECTOR PROBLEM 1
Closed displaced fracture of left patella, unspecified fracture morphology, sequela
Closed displaced fracture of left patella, unspecified fracture morphology, sequela

## 2018-06-15 NOTE — DISCHARGE NOTE ADULT - CARE PLAN
Principal Discharge DX:	Closed displaced fracture of left patella, unspecified fracture morphology, sequela  Goal:	resolution  Assessment and plan of treatment:	Wear bulky chu knee immobilizer as directed. Weight bearing as tolerated. Percocet as needed for pain. PERCOCET IS NOT A CHRONIC MED, PLEASE WEAN OFF AS you IMPROVE WITH THERAPY. Follow up with Dr. Dempsey upon discharge from rehab.  Secondary Diagnosis:	Essential hypertension  Assessment and plan of treatment:	Continue metoprolol and valsartan  Secondary Diagnosis:	Diabetes mellitus type II, controlled  Assessment and plan of treatment:	continue with diabetes medications as prescribed. Continue to check finger sticks at home as directed. Follow up with your PCP or endocrinologist upon discharge from rehab.  Secondary Diagnosis:	Anemia  Goal:	stable Principal Discharge DX:	Closed displaced fracture of left patella, unspecified fracture morphology, sequela  Goal:	resolution  Assessment and plan of treatment:	- You were seen by Ortho in the hospital  - Wear bulky chu knee immobilizer as directed. Weight bearing as tolerated.   - Percocet as needed for pain. PERCOCET IS NOT A CHRONIC MED, PLEASE WEAN OFF AS you IMPROVE WITH THERAPY.   - Follow up with Dr. Dempsey upon discharge from Rehab.  Secondary Diagnosis:	Essential hypertension  Goal:	optimal BP control  Assessment and plan of treatment:	- Continue Metoprolol dose decreased to 25 mg oral 2xdaily and Valsartan 160mg oral daily  - follow up with PCP as outpatient for further management and treatment  Secondary Diagnosis:	Diabetes mellitus type II, controlled  Assessment and plan of treatment:	- continue with diabetes medications as prescribed. Continue to check finger sticks at home as directed. - Follow up with your PCP or endocrinologist upon discharge from rehab.  Secondary Diagnosis:	Anemia  Goal:	stable  Assessment and plan of treatment:	- follow up with Heme as outpatient for further management

## 2018-06-16 VITALS
TEMPERATURE: 98 F | HEART RATE: 76 BPM | SYSTOLIC BLOOD PRESSURE: 118 MMHG | OXYGEN SATURATION: 100 % | RESPIRATION RATE: 18 BRPM | DIASTOLIC BLOOD PRESSURE: 63 MMHG

## 2018-06-16 LAB
GLUCOSE BLDC GLUCOMTR-MCNC: 119 MG/DL — HIGH (ref 70–99)
GLUCOSE BLDC GLUCOMTR-MCNC: 264 MG/DL — HIGH (ref 70–99)

## 2018-06-16 PROCEDURE — 99239 HOSP IP/OBS DSCHRG MGMT >30: CPT

## 2018-06-16 RX ADMIN — Medication 25 MILLIGRAM(S): at 06:20

## 2018-06-16 RX ADMIN — Medication 100 MILLIGRAM(S): at 12:26

## 2018-06-16 RX ADMIN — OXYCODONE AND ACETAMINOPHEN 1 TABLET(S): 5; 325 TABLET ORAL at 06:20

## 2018-06-16 RX ADMIN — OXYCODONE AND ACETAMINOPHEN 1 TABLET(S): 5; 325 TABLET ORAL at 15:00

## 2018-06-16 RX ADMIN — VALSARTAN 160 MILLIGRAM(S): 80 TABLET ORAL at 06:20

## 2018-06-16 RX ADMIN — OXYCODONE AND ACETAMINOPHEN 1 TABLET(S): 5; 325 TABLET ORAL at 01:15

## 2018-06-16 RX ADMIN — OXYCODONE AND ACETAMINOPHEN 1 TABLET(S): 5; 325 TABLET ORAL at 14:35

## 2018-06-16 RX ADMIN — Medication 3: at 12:26

## 2018-06-16 RX ADMIN — Medication 100 MILLIGRAM(S): at 06:20

## 2018-06-16 RX ADMIN — POLYETHYLENE GLYCOL 3350 17 GRAM(S): 17 POWDER, FOR SOLUTION ORAL at 12:26

## 2018-06-16 RX ADMIN — OXYCODONE AND ACETAMINOPHEN 1 TABLET(S): 5; 325 TABLET ORAL at 07:10

## 2018-06-16 RX ADMIN — OXYCODONE AND ACETAMINOPHEN 1 TABLET(S): 5; 325 TABLET ORAL at 00:05

## 2018-06-16 NOTE — PROGRESS NOTE ADULT - ASSESSMENT
74 yo woman admitted for left patellar fracture following mechanical fall, now deemed candidate for rehab placement.      1.  Closed displaced fracture of left patella, unspecified fracture morphology, sequela.    -seen by ortho; no intervention planned at this time  -bulky chu knee immobilizer  -WBAT  -pain control as needed with percocet. Added standing bowel regimen for noted constipation  -follow up with DR Dempsey as outpt  - f/u SW regarding subacute rehab placement.     2. Essential hypertension.     c/w losartan  -continue lower dose of metoprolol 25 bid given sinus bradycardia and 1st degree block not seen on prior EKG.     3. Type 2 diabetes mellitus with complication, unspecified whether long term insulin use.    -ISS  - continue lantus 30 units at bedtime  - FSG before meals TID and at bedtime.     4. Hyperlipidemia, unspecified hyperlipidemia type.    c/w statin.     5. Anemia, unspecified type.     Chronic and stable.    dc plan to rehab today at 3 pm

## 2018-06-16 NOTE — PROGRESS NOTE ADULT - SUBJECTIVE AND OBJECTIVE BOX
Patient is a 73y old  Female who presents with a chief complaint of fall (13 Jun 2018 22:37)    patient seen and examine at bed side  no acute issue    MEDICATIONS  (STANDING):  atorvastatin 80 milliGRAM(s) Oral at bedtime  dextrose 5%. 1000 milliLiter(s) (50 mL/Hr) IV Continuous <Continuous>  dextrose 50% Injectable 12.5 Gram(s) IV Push once  dextrose 50% Injectable 25 Gram(s) IV Push once  dextrose 50% Injectable 25 Gram(s) IV Push once  docusate sodium 100 milliGRAM(s) Oral three times a day  enoxaparin Injectable 40 milliGRAM(s) SubCutaneous every 24 hours  insulin glargine Injectable (LANTUS) 30 Unit(s) SubCutaneous at bedtime  insulin lispro (HumaLOG) corrective regimen sliding scale   SubCutaneous three times a day before meals  insulin lispro (HumaLOG) corrective regimen sliding scale   SubCutaneous at bedtime  metoprolol tartrate 25 milliGRAM(s) Oral two times a day  polyethylene glycol 3350 17 Gram(s) Oral daily  senna 2 Tablet(s) Oral at bedtime  valsartan 160 milliGRAM(s) Oral daily    MEDICATIONS  (PRN):  benzocaine 15 mG/menthol 3.6 mG Lozenge 1 Lozenge Oral four times a day PRN Sore Throat  dextrose 40% Gel 15 Gram(s) Oral once PRN Blood Glucose LESS THAN 70 milliGRAM(s)/deciliter  glucagon  Injectable 1 milliGRAM(s) IntraMuscular once PRN Glucose LESS THAN 70 milligrams/deciliter  oxyCODONE    5 mG/acetaminophen 325 mG 1 Tablet(s) Oral every 6 hours PRN moderate and severe pain        Vital Signs Last 24 Hrs  T(C): 36.9 (16 Jun 2018 05:34), Max: 37.4 (15 Leobardo 2018 12:32)  T(F): 98.5 (16 Jun 2018 05:34), Max: 99.4 (15 Leobardo 2018 12:32)  HR: 65 (16 Jun 2018 05:34) (64 - 89)  BP: 133/80 (16 Jun 2018 05:34) (118/68 - 134/63)  BP(mean): --  RR: 18 (16 Jun 2018 05:34) (17 - 18)  SpO2: 100% (16 Jun 2018 05:34) (99% - 100%)      CAPILLARY BLOOD GLUCOSE      POCT Blood Glucose.: 264 mg/dL (16 Jun 2018 12:03)  POCT Blood Glucose.: 119 mg/dL (16 Jun 2018 07:42)  POCT Blood Glucose.: 178 mg/dL (15 Leobardo 2018 22:02)  POCT Blood Glucose.: 89 mg/dL (15 Leobardo 2018 17:15)          PHYSICAL EXAM  GENERAL: NAD, well-developed  CHEST/LUNG: Clear to auscultation bilaterally; No wheeze  HEART: Regular rate and rhythm; No murmurs, rubs, or gallops  ABDOMEN: Soft, Nontender, Nondistended; Bowel sounds present  EXTREMITIES:  2+ Peripheral Pulses, No clubbing, cyanosis. LLE wrapped in ACE bandage and immobilizer brace in place  PSYCH: AAOx3    NO LABS      Consultant(s) Notes Reviewed:  YES  Care Discussed with Consultants/Other Providers: yES

## 2018-06-21 ENCOUNTER — OUTPATIENT (OUTPATIENT)
Dept: OUTPATIENT SERVICES | Facility: HOSPITAL | Age: 73
LOS: 1 days | Discharge: HOME | End: 2018-06-21
Payer: MEDICARE

## 2018-06-21 DIAGNOSIS — M25.561 PAIN IN RIGHT KNEE: ICD-10-CM

## 2018-06-21 PROCEDURE — 73560 X-RAY EXAM OF KNEE 1 OR 2: CPT | Mod: 26,LT

## 2018-06-28 ENCOUNTER — OUTPATIENT (OUTPATIENT)
Dept: OUTPATIENT SERVICES | Facility: HOSPITAL | Age: 73
LOS: 1 days | Discharge: HOME | End: 2018-06-28
Payer: MEDICARE

## 2018-06-28 DIAGNOSIS — Z96.7 PRESENCE OF OTHER BONE AND TENDON IMPLANTS: Chronic | ICD-10-CM

## 2018-06-28 DIAGNOSIS — S82.102A UNSPECIFIED FRACTURE OF UPPER END OF LEFT TIBIA, INITIAL ENCOUNTER FOR CLOSED FRACTURE: ICD-10-CM

## 2018-06-28 DIAGNOSIS — Z98.89 OTHER SPECIFIED POSTPROCEDURAL STATES: Chronic | ICD-10-CM

## 2018-06-28 DIAGNOSIS — Z98.51 TUBAL LIGATION STATUS: Chronic | ICD-10-CM

## 2018-06-28 PROCEDURE — 73560 X-RAY EXAM OF KNEE 1 OR 2: CPT | Mod: 26,LT

## 2018-09-11 ENCOUNTER — EMERGENCY (EMERGENCY)
Facility: HOSPITAL | Age: 73
LOS: 1 days | Discharge: HOME | End: 2018-09-11
Attending: EMERGENCY MEDICINE | Admitting: EMERGENCY MEDICINE
Payer: COMMERCIAL

## 2018-09-11 VITALS
OXYGEN SATURATION: 100 % | DIASTOLIC BLOOD PRESSURE: 56 MMHG | RESPIRATION RATE: 14 BRPM | SYSTOLIC BLOOD PRESSURE: 121 MMHG | TEMPERATURE: 98 F | HEART RATE: 68 BPM

## 2018-09-11 DIAGNOSIS — Z96.7 PRESENCE OF OTHER BONE AND TENDON IMPLANTS: Chronic | ICD-10-CM

## 2018-09-11 DIAGNOSIS — Z98.51 TUBAL LIGATION STATUS: Chronic | ICD-10-CM

## 2018-09-11 DIAGNOSIS — Z98.89 OTHER SPECIFIED POSTPROCEDURAL STATES: Chronic | ICD-10-CM

## 2018-09-11 DIAGNOSIS — N60.02 SOLITARY CYST OF LEFT BREAST: Chronic | ICD-10-CM

## 2018-09-11 DIAGNOSIS — Z87.81 PERSONAL HISTORY OF (HEALED) TRAUMATIC FRACTURE: Chronic | ICD-10-CM

## 2018-09-11 PROCEDURE — 99283 EMERGENCY DEPT VISIT LOW MDM: CPT

## 2018-09-11 RX ORDER — ACETAMINOPHEN 500 MG
975 TABLET ORAL ONCE
Qty: 0 | Refills: 0 | Status: COMPLETED | OUTPATIENT
Start: 2018-09-11 | End: 2018-09-11

## 2018-09-11 RX ADMIN — Medication 975 MILLIGRAM(S): at 15:24
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
## 2018-09-11 NOTE — ED PROVIDER NOTE - MUSCULOSKELETAL MINIMAL EXAM
no midline spinous process tenderness, 5/5 strength in all extremities, normal ROM in bilateral shoulders
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
## 2018-11-29 NOTE — ED ADULT NURSE NOTE - CAS TRG GENERAL AIRWAY, MLM
[facility] Patient  EP Instructions 1. You are scheduled to have a ROMULO/ With Possible A Fib Ablation on December 11, 2018 , at 8:00 am.    Please check in at 7:00 am. 
 
2. Please go to [facility] and park in the outpatient parking lot that is located around to the back of the hospital and enter through the [facility]. Once you enter through the [facility] check in with the  there. The  will either give you directions or assist you in getting to the cath holding area. 3.  [x]       You are not to eat or drink anything after midnight the morning of the  
            procedure. 4. Please continue to take your medications with a small sip of water on the morning of the procedure. 5. If you are diabetic, do not take your insulin/sugar pill the morning of the procedure. 6. We encourage families to wait in the waiting room on the first floor while the procedure is being done. The Doctor will come out and talk with you as soon as the procedure is over. 7. There is the possibility that you may spend the night in the hospital, depending on the results of the procedure. This will be determined after the procedure is done. 8.   If you or your family have any questions, please call our office Monday-Friday 9:00am  
      -4:30 pm , at [phone], and ask to speak to one of the nurses.
[employer]

## 2019-01-30 ENCOUNTER — APPOINTMENT (OUTPATIENT)
Dept: OPHTHALMOLOGY | Facility: CLINIC | Age: 74
End: 2019-01-30
Payer: MEDICARE

## 2019-01-30 PROCEDURE — 92014 COMPRE OPH EXAM EST PT 1/>: CPT

## 2019-01-30 PROCEDURE — 92225: CPT | Mod: LT

## 2019-01-30 PROCEDURE — 92015 DETERMINE REFRACTIVE STATE: CPT

## 2019-01-30 PROCEDURE — 92004 COMPRE OPH EXAM NEW PT 1/>: CPT

## 2019-01-30 PROCEDURE — 92286 ANT SGM IMG I&R SPECLR MIC: CPT

## 2019-08-16 ENCOUNTER — EMERGENCY (EMERGENCY)
Facility: HOSPITAL | Age: 74
LOS: 1 days | Discharge: HOME | End: 2019-08-16
Attending: EMERGENCY MEDICINE | Admitting: EMERGENCY MEDICINE
Payer: MEDICARE

## 2019-08-16 VITALS
OXYGEN SATURATION: 100 % | RESPIRATION RATE: 18 BRPM | HEART RATE: 64 BPM | DIASTOLIC BLOOD PRESSURE: 88 MMHG | SYSTOLIC BLOOD PRESSURE: 158 MMHG

## 2019-08-16 VITALS
DIASTOLIC BLOOD PRESSURE: 81 MMHG | TEMPERATURE: 98 F | OXYGEN SATURATION: 100 % | HEART RATE: 70 BPM | RESPIRATION RATE: 18 BRPM | SYSTOLIC BLOOD PRESSURE: 134 MMHG

## 2019-08-16 DIAGNOSIS — Z98.51 TUBAL LIGATION STATUS: Chronic | ICD-10-CM

## 2019-08-16 DIAGNOSIS — Z96.7 PRESENCE OF OTHER BONE AND TENDON IMPLANTS: Chronic | ICD-10-CM

## 2019-08-16 DIAGNOSIS — Z87.81 PERSONAL HISTORY OF (HEALED) TRAUMATIC FRACTURE: Chronic | ICD-10-CM

## 2019-08-16 DIAGNOSIS — N60.02 SOLITARY CYST OF LEFT BREAST: Chronic | ICD-10-CM

## 2019-08-16 DIAGNOSIS — Z98.89 OTHER SPECIFIED POSTPROCEDURAL STATES: Chronic | ICD-10-CM

## 2019-08-16 PROCEDURE — 73030 X-RAY EXAM OF SHOULDER: CPT | Mod: 26,RT

## 2019-08-16 PROCEDURE — 73630 X-RAY EXAM OF FOOT: CPT | Mod: 26,RT

## 2019-08-16 PROCEDURE — 93010 ELECTROCARDIOGRAM REPORT: CPT

## 2019-08-16 PROCEDURE — 71046 X-RAY EXAM CHEST 2 VIEWS: CPT | Mod: 26

## 2019-08-16 PROCEDURE — 99283 EMERGENCY DEPT VISIT LOW MDM: CPT | Mod: 25,GC

## 2019-08-16 PROCEDURE — 73610 X-RAY EXAM OF ANKLE: CPT | Mod: 26,RT

## 2019-08-16 RX ORDER — IBUPROFEN 200 MG
600 TABLET ORAL ONCE
Refills: 0 | Status: COMPLETED | OUTPATIENT
Start: 2019-08-16 | End: 2019-08-16

## 2019-08-16 RX ORDER — TETANUS TOXOID, REDUCED DIPHTHERIA TOXOID AND ACELLULAR PERTUSSIS VACCINE, ADSORBED 5; 2.5; 8; 8; 2.5 [IU]/.5ML; [IU]/.5ML; UG/.5ML; UG/.5ML; UG/.5ML
0.5 SUSPENSION INTRAMUSCULAR ONCE
Refills: 0 | Status: COMPLETED | OUTPATIENT
Start: 2019-08-16 | End: 2019-08-16

## 2019-08-16 RX ADMIN — Medication 600 MILLIGRAM(S): at 11:49

## 2019-08-16 RX ADMIN — TETANUS TOXOID, REDUCED DIPHTHERIA TOXOID AND ACELLULAR PERTUSSIS VACCINE, ADSORBED 0.5 MILLILITER(S): 5; 2.5; 8; 8; 2.5 SUSPENSION INTRAMUSCULAR at 12:36

## 2019-08-16 NOTE — ED PROVIDER NOTE - ATTENDING CONTRIBUTION TO CARE
Dr. Morgan: I have personally performed a face to face bedside history and physical examination of this patient. I have discussed the history, examination, review of systems, assessment and plan of management with the resident. I have reviewed the electronic medical record and amended it to reflect my history, review of systems, physical exam, assessment and plan.    71F with pmh of HTN, DM, HL p/w right  shoulder/chest/foot pain s/p mechanical fall yesterday. No head injury, dyspnea LOC, n/v, blood thinner use.    On exam  A -airway intact, no stridor or drooling  B- symmetric breath sounds  C- well perfused extremities,no external bleeding  GEN - NAD; well appearing; A+O x3   HEAD - NC/AT     EYES - EOMI, PERRLA  ENT -   mucous membranes  moist , no facial bone tenderness, normal mandibular alignment  NECK - No midline C spine tenderness  PULM - CTA b/l,  symmetric breath sounds,  mild R chest wall tenderness, no crepitus   COR -  RRR, S1 S2, no murmurs  ABD - , ND, NT, soft, no guarding, no rebound, no masses    BACK - no CVA tenderness, nontender spine, no contusions     EXTREMS - FROM all extremities, no deformity. R foot tenderness dorsally. R shoulder pain on ROM. NO deformity. FROM shoulder and elbow and ankle.   SKIN - no rash or bruising      NEUROLOGIC - alert, sensation nl, motor 5/5 RUE/LUE/RLE/LLE    71F with pmh of HTN, HL, DM p/w chest wall tenderness, shoulder pain, foot pain s/p mechanical fall from standing. No head trauma. Likely chest wall/foot contusion, will r/o ptx, rib fx, foot/shoulder fx. do not suspect  ICH or syncope. Plan for pain control and Xrays of shoulder, chest, foot. Dr. Morgan: I have personally performed a face to face bedside history and physical examination of this patient. I have discussed the history, examination, review of systems, assessment and plan of management with the resident. I have reviewed the electronic medical record and amended it to reflect my history, review of systems, physical exam, assessment and plan.    71F with pmh of HTN, DM, HL p/w right  shoulder/chest/foot pain s/p mechanical fall yesterday. No head injury, dyspnea LOC, n/v, blood thinner use. Reports that tetanus not utd.    On exam  A -airway intact, no stridor or drooling  B- symmetric breath sounds  C- well perfused extremities,no external bleeding  GEN - NAD; well appearing; A+O x3   HEAD - NC/AT     EYES - EOMI, PERRLA  ENT -   mucous membranes  moist , no facial bone tenderness, normal mandibular alignment  NECK - No midline C spine tenderness  PULM - CTA b/l,  symmetric breath sounds,  mild R chest wall tenderness, no crepitus   COR -  RRR, S1 S2, no murmurs  ABD - , ND, NT, soft, no guarding, no rebound, no masses    BACK - no CVA tenderness, nontender spine, no contusions     EXTREMS - FROM all extremities, no deformity. R foot tenderness dorsally and at lateral malleolus. R shoulder pain on ROM. No deformity. FROM shoulder and elbow and ankle.   SKIN - no rash or bruising. abrasion to R elbow, no point tenderness.     NEUROLOGIC - alert, sensation nl, motor 5/5 RUE/LUE/RLE/LLE    71F with pmh of HTN, HL, DM p/w chest wall tenderness, shoulder pain, foot pain s/p mechanical fall from standing. No head trauma. Likely chest wall/foot contusion, will r/o ptx, rib fx, foot/shoulder fx. do not suspect  ICH or syncope. Plan for pain control, tdap and Xrays of shoulder, chest, ankle,  foot.

## 2019-08-16 NOTE — ED PROVIDER NOTE - PHYSICAL EXAMINATION
GENERAL: no acute distress, non-toxic appearing  HEAD: normocephalic, atraumatic  HEENT: normal conjunctiva, oral mucosa moist, neck supple  CARDIAC: regular rate and rhythm, normal S1 and S2,  no appreciable murmurs  PULM: clear to ascultation bilaterally, no crackles, rales, rhonchi, or wheezing  GI: abdomen nondistended, soft, nontender, no guarding or rebound tenderness  NEURO: alert and oriented x 3, normal speech, PERRLA, EOMI, no focal motor or sensory deficits, nonantalgic gait  MSK: limited ROM in the right shoulder, tenderness in the dorsal portion of the right foot around the 2nd metacarpal bone, no visible deformities, no peripheral edema, calf tenderness/redness/swelling  SKIN: +abrasion on the right elbow over the olecranon process, no visible rashes, dry, well-perfused  PSYCH: appropriate mood and affect

## 2019-08-16 NOTE — ED ADULT TRIAGE NOTE - CHIEF COMPLAINT QUOTE
Pt states she tripped and fell yesterday and landed on right side. Co pain to right side, right arm and right foot. Right foot swollen. Abrasion to right elbow noted. Co pain when taking a deep breath. Pt states she tripped and fell yesterday and landed on right side. Co pain to right side, right chest, right arm and right foot. Right foot swollen. Abrasion to right elbow noted. Co pain when taking a deep breath.

## 2019-08-16 NOTE — ED ADULT NURSE NOTE - OBJECTIVE STATEMENT
75 yo female, s/p mechanical fall yesterday where pt landed on right side. abrasion noted to right elbow. pt reports pain to right arm, right rib cage area, right leg. pt denies SOB, however reports it being painful to on right side when taking deep breath in. pt denies hitting head, loc or any other falls within last six months. pt denies chest pain, n/v/d, fevers. pt medicated as ordered.

## 2019-08-16 NOTE — ED PROVIDER NOTE - NSFOLLOWUPINSTRUCTIONS_ED_ALL_ED_FT
Please follow up with your Primary Care Doctor within 3-5 days    - Extra strength Tylenol up to 1000 mg once a day  - Motrin up to 400 mg as needed for pain    - Be sure to return to the ED if you develop new or worsening symptoms. Specific signs and symptoms to be vigilant of: fever or chills, chest pain, difficulty breathing, palpitations, loss of consciousness, headache, vision changes, slurred speech, difficulty swallowing or drooling, facial droop, weakness in the arms or legs, numbness or tingling, abdominal pain, nausea or vomiting, diarrhea, constipation, blood in the stool or urine, pain on urination, difficulty urinating.

## 2019-08-16 NOTE — ED PROVIDER NOTE - CLINICAL SUMMARY MEDICAL DECISION MAKING FREE TEXT BOX
73y/o F with h/o HTN, T2DM, HLD presents to the ED 24 hours after a fall with worsening pain on the right side of her body. Will order x ray to r/o fracture and give NSAIDs for pain management. Reassess after pain and d/c home if xrays are negative.

## 2019-08-16 NOTE — ED PROVIDER NOTE - NS ED ROS FT
GENERAL: denies fever, chills  HEENT: denies congestion, dysphagia, lightheadedness   CARDIAC: denies chest pain, palpitations  PULM: denies dyspnea, wheezing, cough  GI: denies abdominal pain, nausea, vomiting, diarrhea, constipation, melena, hematochezia  : denies dysuria, frequency, incontinence, hematuria  NEURO: denies headache, motor weakness, sensory changes  MSK: +R shoulder foot pain,  SKIN: +new abrasion on the R elbow  HEME: denies active bleeding, bruising

## 2019-08-16 NOTE — ED PROVIDER NOTE - OBJECTIVE STATEMENT
75y/o F w/ h/o T2DM, HTN, HLD presents to the ED after a fall. Patient states that 12PM yesterday she tripped over a shopping cart and fell to the ground on her right side. She comes in today because she is still feeling pain from the fall and had a difficult time sleeping last night. She also complains of pleuritic chest pain, pain in her right foot, pain in the right shoulder. She denies AC use, head trauma, LOC, syncope, SOB, nausea, vomiting.

## 2019-08-16 NOTE — ED ADULT NURSE NOTE - CHIEF COMPLAINT QUOTE
Pt states she tripped and fell yesterday and landed on right side. Co pain to right side, right chest, right arm and right foot. Right foot swollen. Abrasion to right elbow noted. Co pain when taking a deep breath.

## 2019-08-16 NOTE — ED ADULT NURSE NOTE - NSIMPLEMENTINTERV_GEN_ALL_ED
Implemented All Fall Risk Interventions:  [place] to call system. Call bell, personal items and telephone within reach. Instruct patient to call for assistance. Room bathroom lighting operational. Non-slip footwear when patient is off stretcher. Physically safe environment: no spills, clutter or unnecessary equipment. Stretcher in lowest position, wheels locked, appropriate side rails in place. Provide visual cue, wrist band, yellow gown, etc. Monitor gait and stability. Monitor for mental status changes and reorient to person, place, and time. Review medications for side effects contributing to fall risk. Reinforce activity limits and safety measures with patient and family.

## 2020-03-25 NOTE — PATIENT PROFILE ADULT. - FUNCTIONAL SCREEN CURRENT LEVEL: EATING, MLM
"atorvastatin (LIPITOR) 80 MG tablet  90 tablet  3  1/4/2019     Last Written Prescription Date:  1/4/19  Last Fill Quantity: 90,  # refills: 3   Last office visit: 10/29/2019 with prescribing provider:  Iveth   Future Office Visit:  None    Requested Prescriptions   Pending Prescriptions Disp Refills     atorvastatin (LIPITOR) 80 MG tablet 90 tablet 3     Sig: Take 1 tablet (80 mg) by mouth daily       Statins Protocol Passed - 3/25/2020  8:42 AM        Passed - LDL on file in past 12 months     Recent Labs   Lab Test 10/29/19  0827   LDL 64             Passed - No abnormal creatine kinase in past 12 months     Recent Labs   Lab Test 06/02/14  0817                   Passed - Recent (12 mo) or future (30 days) visit within the authorizing provider's specialty     Patient has had an office visit with the authorizing provider or a provider within the authorizing providers department within the previous 12 mos or has a future within next 30 days. See \"Patient Info\" tab in inbasket, or \"Choose Columns\" in Meds & Orders section of the refill encounter.              Passed - Medication is active on med list        Passed - Patient is age 18 or older           No flowsheet data found.      " (0) independent

## 2020-07-07 ENCOUNTER — EMERGENCY (EMERGENCY)
Facility: HOSPITAL | Age: 75
LOS: 1 days | Discharge: HOME | End: 2020-07-07
Attending: EMERGENCY MEDICINE | Admitting: EMERGENCY MEDICINE
Payer: MEDICARE

## 2020-07-07 VITALS
OXYGEN SATURATION: 100 % | SYSTOLIC BLOOD PRESSURE: 130 MMHG | TEMPERATURE: 98 F | HEART RATE: 71 BPM | DIASTOLIC BLOOD PRESSURE: 76 MMHG | RESPIRATION RATE: 18 BRPM

## 2020-07-07 DIAGNOSIS — Z98.51 TUBAL LIGATION STATUS: Chronic | ICD-10-CM

## 2020-07-07 DIAGNOSIS — Z96.7 PRESENCE OF OTHER BONE AND TENDON IMPLANTS: Chronic | ICD-10-CM

## 2020-07-07 DIAGNOSIS — Z98.89 OTHER SPECIFIED POSTPROCEDURAL STATES: Chronic | ICD-10-CM

## 2020-07-07 DIAGNOSIS — N60.02 SOLITARY CYST OF LEFT BREAST: Chronic | ICD-10-CM

## 2020-07-07 DIAGNOSIS — Z87.81 PERSONAL HISTORY OF (HEALED) TRAUMATIC FRACTURE: Chronic | ICD-10-CM

## 2020-07-07 PROCEDURE — 73562 X-RAY EXAM OF KNEE 3: CPT | Mod: 26,RT

## 2020-07-07 PROCEDURE — 71045 X-RAY EXAM CHEST 1 VIEW: CPT | Mod: 26

## 2020-07-07 PROCEDURE — 73030 X-RAY EXAM OF SHOULDER: CPT | Mod: 26,LT

## 2020-07-07 PROCEDURE — 99283 EMERGENCY DEPT VISIT LOW MDM: CPT | Mod: GC

## 2020-07-07 RX ORDER — ACETAMINOPHEN 500 MG
650 TABLET ORAL ONCE
Refills: 0 | Status: COMPLETED | OUTPATIENT
Start: 2020-07-07 | End: 2020-07-07

## 2020-07-07 RX ORDER — LIDOCAINE 4 G/100G
1 CREAM TOPICAL ONCE
Refills: 0 | Status: COMPLETED | OUTPATIENT
Start: 2020-07-07 | End: 2020-07-07

## 2020-07-07 RX ADMIN — LIDOCAINE 1 PATCH: 4 CREAM TOPICAL at 16:07

## 2020-07-07 RX ADMIN — Medication 650 MILLIGRAM(S): at 16:07

## 2020-07-07 NOTE — ED PROVIDER NOTE - PROGRESS NOTE DETAILS
Lexie FINLEY (PGY-1): Pain has improved following medication, discussed normal xray findings with patient at bedside, Discussed strict return precautions and follow-up instructions with her PCP. Patient agreeable with plan. Medically stable for discharge.
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
## 2020-07-07 NOTE — ED PROVIDER NOTE - PATIENT PORTAL LINK FT
You can access the FollowMyHealth Patient Portal offered by [facility] by registering at the following website: [email or web address]. By joining [employer]’s FollowMyHealth portal, you will also be able to view your health information using other applications (apps) compatible with our system.

## 2020-07-07 NOTE — ED PROVIDER NOTE - ATTENDING CONTRIBUTION TO CARE
I performed a history and physical exam of the patient and discussed their management with the resident. I reviewed the resident's note and agree with the documented findings and plan of care. I have edited as appropriate. My medical decision making and observations are found above.  75y F w/ PMHx HTN, DM presenting with R knee pain and L shoulder and rib pain after fall 1.5 weeks ago. Neurologically intact, able to ambulate w/ cane with minimal pain. No point tenderness of ribs, full ROM, no obvious deformities, no midline tenderness. Not on AC. Doubt and intracranial pathology as fall was greater than one week ago, otherwise neurologically intact. Xrays, pain medication, reassess.

## 2020-07-07 NOTE — ED PROVIDER NOTE - CARE PLAN
Principal Discharge DX:	Acute pain of right knee  Secondary Diagnosis:	Rib pain on left side  Secondary Diagnosis:	Fall on same level from slipping, tripping, or stumbling, initial encounter

## 2020-07-07 NOTE — ED PROVIDER NOTE - CLINICAL SUMMARY MEDICAL DECISION MAKING FREE TEXT BOX
75y F w/ PMHx HTN, DM presenting with R knee pain and L shoulder and rib pain after fall 1.5 weeks ago. Neurologically intact, able to ambulate w/ cane with minimal pain. No point tenderness of ribs, full ROM, no obvious deformities, no midline tenderness. Not on AC. Doubt and intracranial pathology as fall was greater than one week ago, otherwise neurologically intact. Xrays, pain medication, reassess. vaginal bleeding

## 2020-07-07 NOTE — ED PROVIDER NOTE - NSFOLLOWUPINSTRUCTIONS_ED_ALL_ED_FT
- Lab and imaging results, if performed, were discussed with you along with your discharge diagnosis    - Please schedule a follow-up appointment with your primary doctor within one week to be reevaluated.    - Return to the ED for any new, worsening, or concerning symptoms to you including worsening pain, numbness, dizziness or recurrent falls     - Continue all prescribed medications    - Take ibuprofen/tylenol as directed as needed for pain  To control your pain at home, you should take Ibuprofen 400 mg along with Tylenol 650mg-1000mg every 6 to 8 hours. Limit your maximum daily Tylenol from all sources to 4000mg. Be aware that many other medications contain acetaminophen which is also known as Tylenol. Taking Tylenol and Ibuprofen together has been shown to be more effective at relieving pain than taking them separately. These are both over the counter medications that you can  at your local pharmacy without a prescription. You need to respect all of the warnings on the bottles. You shouldn’t take these medications for more than a week without following up with your doctor. Both medications come with certain risks and side effects that you need to discuss with your doctor, especially if you are taking them for a prolonged period.    -You can purchase Salonpas over the counter for your back pain. Please only use as directed and as needed for you pain     - Rest and keep yourself hydrated with fluids

## 2020-07-15 NOTE — ED ADULT NURSE NOTE - NS ED NOTE ABUSE RESPONSE YN
July 20, 2020        Enid Cisneros  [id number] [address] [place] [id number]    This letter provides a written record that you were tested for COVID-19 on 7/15/20.       Your result was negative. This means that we didn t find the virus that causes COVID-19 in your sample. A test may show negative when you do actually have the virus. This can happen when the virus is in the early stages of infection, before you feel illness symptoms.    If you have symptoms   Stay home and away from others (self-isolate) until you meet ALL of the guidelines below:    You ve had no fever--and no medicine that reduces fever--for 3 full days (72 hours). And      Your other symptoms have gotten better. For example, your cough or breathing has improved. And     At least 10 days have passed since your symptoms started.    During this time:    Stay home. Don t go to work, school or anywhere else.     Stay in your own room, including for meals. Use your own bathroom if you can.    Stay away from others in your home. No hugging, kissing or shaking hands. No visitors.    Clean  high touch  surfaces often (doorknobs, counters, handles, etc.). Use a household cleaning spray or wipes. You can find a full list on the EPA website at www.epa.gov/pesticide-registration/list-n-disinfectants-use-against-sars-cov-2.    Cover your mouth and nose with a mask, tissue or washcloth to avoid spreading germs.    Wash your hands and face often with soap and water.    Going back to work  Check with your employer for any guidelines to follow for going back to work.    Employers: This document serves as formal notice that your employee tested negative for COVID-19, as of the testing date shown above.     Yes

## 2020-07-20 NOTE — ED ADULT NURSE NOTE - DOES PATIENT HAVE ADVANCE DIRECTIVE
Requesting Norco and Flexeril, but I cannot find that we have prescribed Flexeril in this clinic.    No

## 2020-08-09 NOTE — ED ADULT TRIAGE NOTE - SOURCE OF INFORMATION
Cardiovascular Progress Note      Chief Complaint:   WCT    Impression/Recommendations: Wide complex tachycardia at OSH. VT or SVT with aberrancy. 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 Consider ischemic evaluation to determine LM severity. FFR vs Stress test. D/w CT surgery and lesions likely not severe enough to cause VT. Patient has no symptoms of angina/CHF. Remains on ARB and BB for Cardiomyopathy ; ASA/Statin for CAD  Plan for EP study to determine source of arrhythmia, Monday 8/10  Endocrinology workup in process.      Interval History:  Patient seen and examined. No events overnight. Denies chest pain palpitation shortness of breath dizziness. Shows understanding of discussions with interventional cardiology, electrophysiology, CT surgery and Endocrinology. Telemetry sinus rhythm 80s  no events    8/5/20   -Normal left ventricle size, mild wall thickness and mildly reduced systolic   function with an estimated ejection fraction of 45%. -There is mild diffuse hypokinesis. -Grade I diastolic dysfunction with normal LV filling pressures. E/e\"=7.0   -The aortic valve is mildly thickened/calcified but opens well with normal   gradients.   -Trivial aortic regurgitation.   -The aortic root is mildly dilated.        Medications:  melatonin tablet 6 mg, Nightly PRN  insulin lispro (1 Unit Dial) 0-12 Units, TID WC  insulin lispro (1 Unit Dial) 0-6 Units, Nightly  glucose (GLUTOSE) 40 % oral gel 15 g, PRN  dextrose 50 % IV solution, PRN  glucagon (rDNA) injection 1 mg, PRN  dextrose 5 % solution, PRN  insulin glargine (LANTUS;BASAGLAR) injection pen 5 Units, Q12H  insulin lispro (1 Unit Dial) 3 Units, TID WC  Vitamin D (CHOLECALCIFEROL) tablet 2,000 Units, Daily  zolpidem (AMBIEN) tablet 5 mg, Nightly PRN  tiZANidine (ZANAFLEX) tablet 4 mg, Q8H PRN  rosuvastatin (CRESTOR) tablet 20 mg, Nightly  omega-3 acid ethyl esters (LOVAZA) capsule 2 g, BID  labetalol (NORMODYNE;TRANDATE) injection 10 mg, Q4H PRN  metoprolol tartrate (LOPRESSOR) tablet 100 mg, BID  sodium chloride flush 0.9 % injection 10 mL, 2 times per day  sodium chloride flush 0.9 % injection 10 mL, PRN  acetaminophen (TYLENOL) tablet 650 mg, Q4H PRN  oxyCODONE-acetaminophen (PERCOCET) 5-325 MG per tablet 1 tablet, Q4H PRN    Or  oxyCODONE-acetaminophen (PERCOCET) 5-325 MG per tablet 2 tablet, Q4H PRN  ondansetron (ZOFRAN) injection 4 mg, Q6H PRN  sodium chloride flush 0.9 % injection 10 mL, 2 times per day  sodium chloride flush 0.9 % injection 10 mL, PRN  polyethylene glycol (GLYCOLAX) packet 17 g, Daily PRN  promethazine (PHENERGAN) tablet 12.5 mg, Q6H PRN  enoxaparin (LOVENOX) injection 40 mg, Daily  potassium chloride (KLOR-CON M) extended release tablet 40 mEq, PRN    Or  potassium bicarb-citric acid (EFFER-K) effervescent tablet 40 mEq, PRN    Or  potassium chloride 10 mEq/100 mL IVPB (Peripheral Line), PRN  magnesium sulfate 1 g in dextrose 5% 100 mL IVPB, PRN  sodium phosphate 19.17 mmol in dextrose 5 % 250 mL IVPB, PRN    Or  sodium phosphate 38.34 mmol in dextrose 5 % 250 mL IVPB, PRN  amLODIPine (NORVASC) tablet 10 mg, Daily  aspirin tablet 325 mg, Daily  isosorbide mononitrate (IMDUR) extended release tablet 30 mg, Daily  therapeutic multivitamin-minerals 1 tablet, Daily  nitroGLYCERIN (NITROSTAT) SL tablet 0.4 mg, Q5 Min PRN  tamsulosin (FLOMAX) capsule 0.8 mg, Nightly  traMADol (ULTRAM) tablet 50 mg, Q6H PRN  gabapentin (NEURONTIN) capsule 600 mg, BID  metoprolol (LOPRESSOR) injection 5 mg, Q6H PRN  losartan (COZAAR) tablet 100 mg, Daily        I/O:     Intake/Output Summary (Last 24 hours) at 8/9/2020 1020  Last data filed at 8/9/2020 0944  Gross per 24 hour   Intake --   Output 1550 ml   Net -1550 ml       Physical Exam:    BP (!) 136/94   Pulse 85   Temp 97.4 °F (36.3 °C) (Temporal)   Resp 19   Ht 6' 2\" (1.88 m)   Wt 272 lb 11.3 oz (123.7 kg)   SpO2 97%   BMI 35.01 kg/m²   Wt Readings from Last 3 Encounters: 08/09/20 272 lb 11.3 oz (123.7 kg)   05/22/20 264 lb 15.9 oz (120.2 kg)   05/18/20 265 lb (120.2 kg)       GENERAL: Well developed, well nourished, no acute distress  NEUROLOGICAL: Alert and oriented x3  PSYCH: Normal mood and affect   SKIN: Warm and dry, without lesions  HEENT: Normocephalic, atraumatic, Sclera non-icteric, mucous membranes moist  NECK: supple, JVP normal, thyroid not enlarged   CAROTID: Normal upstroke, no bruits  CARDIAC: Normal PMI, regular rate and rhythm, normal S1S2, no murmur, rub  RESPIRATORY: Normal respiratory effort, clear to auscultation bilaterally  EXTREMITIES: No cyanosis, clubbing or edema, palpable pulses bilaterally   MUSCULOSKELETAL: No joint swelling or tenderness, no chest wall tenderness  GASTROINTESTINAL:  soft, non-tender, no bruit      Data Review:    CBC:   Recent Labs     08/09/20  0304   WBC 5.8   HGB 13.7   HCT 41.4   MCV 91.4        BMP:   Recent Labs     08/09/20  0304   *   K 4.5      CO2 25   BUN 20   CREATININE 1.1   GFRAA >60      PT/INR:   Recent Labs     08/06/20  1343   PROTIME 11.7   INR 1.01     APTT:   Recent Labs     08/06/20  1343   APTT 37.2*               [place] D.O., [facility]  Interventional Cardiology     o: [phone]  [facility] [address], [address]      NOTE:  This report was transcribed using voice recognition software. Every effort was made to ensure accuracy; however, inadvertent computerized transcription errors may be present. Patient

## 2021-02-23 ENCOUNTER — EMERGENCY (EMERGENCY)
Facility: HOSPITAL | Age: 76
LOS: 1 days | Discharge: HOME | End: 2021-02-23
Attending: EMERGENCY MEDICINE | Admitting: EMERGENCY MEDICINE
Payer: COMMERCIAL

## 2021-02-23 VITALS
RESPIRATION RATE: 18 BRPM | TEMPERATURE: 97 F | OXYGEN SATURATION: 100 % | DIASTOLIC BLOOD PRESSURE: 86 MMHG | HEIGHT: 66 IN | HEART RATE: 65 BPM | SYSTOLIC BLOOD PRESSURE: 153 MMHG

## 2021-02-23 DIAGNOSIS — Z98.51 TUBAL LIGATION STATUS: Chronic | ICD-10-CM

## 2021-02-23 DIAGNOSIS — Z98.89 OTHER SPECIFIED POSTPROCEDURAL STATES: Chronic | ICD-10-CM

## 2021-02-23 DIAGNOSIS — N60.02 SOLITARY CYST OF LEFT BREAST: Chronic | ICD-10-CM

## 2021-02-23 DIAGNOSIS — Z87.81 PERSONAL HISTORY OF (HEALED) TRAUMATIC FRACTURE: Chronic | ICD-10-CM

## 2021-02-23 DIAGNOSIS — Z96.7 PRESENCE OF OTHER BONE AND TENDON IMPLANTS: Chronic | ICD-10-CM

## 2021-02-23 LAB
ALBUMIN SERPL ELPH-MCNC: 4.1 G/DL — SIGNIFICANT CHANGE UP (ref 3.3–5)
ALP SERPL-CCNC: 134 U/L — HIGH (ref 40–120)
ALT FLD-CCNC: 16 U/L — SIGNIFICANT CHANGE UP (ref 4–33)
ANION GAP SERPL CALC-SCNC: 12 MMOL/L — SIGNIFICANT CHANGE UP (ref 7–14)
AST SERPL-CCNC: 26 U/L — SIGNIFICANT CHANGE UP (ref 4–32)
BASOPHILS # BLD AUTO: 0.02 K/UL — SIGNIFICANT CHANGE UP (ref 0–0.2)
BASOPHILS NFR BLD AUTO: 0.4 % — SIGNIFICANT CHANGE UP (ref 0–2)
BILIRUB SERPL-MCNC: 0.5 MG/DL — SIGNIFICANT CHANGE UP (ref 0.2–1.2)
BUN SERPL-MCNC: 12 MG/DL — SIGNIFICANT CHANGE UP (ref 7–23)
CALCIUM SERPL-MCNC: 9.5 MG/DL — SIGNIFICANT CHANGE UP (ref 8.4–10.5)
CHLORIDE SERPL-SCNC: 102 MMOL/L — SIGNIFICANT CHANGE UP (ref 98–107)
CO2 SERPL-SCNC: 23 MMOL/L — SIGNIFICANT CHANGE UP (ref 22–31)
CREAT SERPL-MCNC: 0.77 MG/DL — SIGNIFICANT CHANGE UP (ref 0.5–1.3)
EOSINOPHIL # BLD AUTO: 0.03 K/UL — SIGNIFICANT CHANGE UP (ref 0–0.5)
EOSINOPHIL NFR BLD AUTO: 0.6 % — SIGNIFICANT CHANGE UP (ref 0–6)
GLUCOSE SERPL-MCNC: 259 MG/DL — HIGH (ref 70–99)
HCT VFR BLD CALC: 37.7 % — SIGNIFICANT CHANGE UP (ref 34.5–45)
HGB BLD-MCNC: 11.5 G/DL — SIGNIFICANT CHANGE UP (ref 11.5–15.5)
IANC: 3.78 K/UL — SIGNIFICANT CHANGE UP (ref 1.5–8.5)
IMM GRANULOCYTES NFR BLD AUTO: 0.2 % — SIGNIFICANT CHANGE UP (ref 0–1.5)
LYMPHOCYTES # BLD AUTO: 1.33 K/UL — SIGNIFICANT CHANGE UP (ref 1–3.3)
LYMPHOCYTES # BLD AUTO: 24.6 % — SIGNIFICANT CHANGE UP (ref 13–44)
MCHC RBC-ENTMCNC: 22.4 PG — LOW (ref 27–34)
MCHC RBC-ENTMCNC: 30.5 GM/DL — LOW (ref 32–36)
MCV RBC AUTO: 73.5 FL — LOW (ref 80–100)
MONOCYTES # BLD AUTO: 0.24 K/UL — SIGNIFICANT CHANGE UP (ref 0–0.9)
MONOCYTES NFR BLD AUTO: 4.4 % — SIGNIFICANT CHANGE UP (ref 2–14)
NEUTROPHILS # BLD AUTO: 3.78 K/UL — SIGNIFICANT CHANGE UP (ref 1.8–7.4)
NEUTROPHILS NFR BLD AUTO: 69.8 % — SIGNIFICANT CHANGE UP (ref 43–77)
NRBC # BLD: 0 /100 WBCS — SIGNIFICANT CHANGE UP
NRBC # FLD: 0 K/UL — SIGNIFICANT CHANGE UP
PLATELET # BLD AUTO: 255 K/UL — SIGNIFICANT CHANGE UP (ref 150–400)
POTASSIUM SERPL-MCNC: 4.2 MMOL/L — SIGNIFICANT CHANGE UP (ref 3.5–5.3)
POTASSIUM SERPL-SCNC: 4.2 MMOL/L — SIGNIFICANT CHANGE UP (ref 3.5–5.3)
PROT SERPL-MCNC: 7.7 G/DL — SIGNIFICANT CHANGE UP (ref 6–8.3)
RBC # BLD: 5.13 M/UL — SIGNIFICANT CHANGE UP (ref 3.8–5.2)
RBC # FLD: 15.1 % — HIGH (ref 10.3–14.5)
SODIUM SERPL-SCNC: 137 MMOL/L — SIGNIFICANT CHANGE UP (ref 135–145)
WBC # BLD: 5.41 K/UL — SIGNIFICANT CHANGE UP (ref 3.8–10.5)
WBC # FLD AUTO: 5.41 K/UL — SIGNIFICANT CHANGE UP (ref 3.8–10.5)

## 2021-02-23 PROCEDURE — 93010 ELECTROCARDIOGRAM REPORT: CPT

## 2021-02-23 PROCEDURE — 99236 HOSP IP/OBS SAME DATE HI 85: CPT | Mod: 25

## 2021-02-23 RX ORDER — SODIUM CHLORIDE 9 MG/ML
1000 INJECTION INTRAMUSCULAR; INTRAVENOUS; SUBCUTANEOUS ONCE
Refills: 0 | Status: COMPLETED | OUTPATIENT
Start: 2021-02-23 | End: 2021-02-23

## 2021-02-23 RX ORDER — METOCLOPRAMIDE HCL 10 MG
10 TABLET ORAL ONCE
Refills: 0 | Status: COMPLETED | OUTPATIENT
Start: 2021-02-23 | End: 2021-02-23

## 2021-02-23 RX ORDER — MECLIZINE HCL 12.5 MG
25 TABLET ORAL ONCE
Refills: 0 | Status: COMPLETED | OUTPATIENT
Start: 2021-02-23 | End: 2021-02-23

## 2021-02-23 RX ORDER — DIAZEPAM 5 MG
5 TABLET ORAL ONCE
Refills: 0 | Status: DISCONTINUED | OUTPATIENT
Start: 2021-02-23 | End: 2021-02-23

## 2021-02-23 RX ADMIN — SODIUM CHLORIDE 1000 MILLILITER(S): 9 INJECTION INTRAMUSCULAR; INTRAVENOUS; SUBCUTANEOUS at 20:13

## 2021-02-23 RX ADMIN — Medication 5 MILLIGRAM(S): at 21:57

## 2021-02-23 RX ADMIN — Medication 25 MILLIGRAM(S): at 20:13

## 2021-02-23 RX ADMIN — Medication 10 MILLIGRAM(S): at 20:13

## 2021-02-23 NOTE — ED PROVIDER NOTE - CLINICAL SUMMARY MEDICAL DECISION MAKING FREE TEXT BOX
75 y/o female pmh htn, dm w/ room spinning dizziness x today- symptoms consistent with peripheral vertigo however due to age and risk factors, will check labs, CTA heda and neck and reassess.

## 2021-02-23 NOTE — ED PROVIDER NOTE - PROGRESS NOTE DETAILS
MICHAEL Rojas- Pt not feeling any better after meds, after CT, will place in CDU for MRI. Attempted to touch base with Dr. Rafi Low whom I spoke with earlier in the evening but mailbox is full and cannot accept messages [phone]. MICHAEL Rojas to try again in AM. MICHAEL Rojas- Spoke with PMD, Dr. Brown who would like neurologist, Dr. Calderon consulted.

## 2021-02-23 NOTE — ED PROVIDER NOTE - OBJECTIVE STATEMENT
75 y/o female pmh htn, dm c/o dizziness x today. Pt admits to waking up, getting out of bed and having room spinning dizziness. Pt states that she felt like she was going to fall. Pt laid down and symptoms improved. Pt admit sto recurrent symptoms with positional change. Pt admits to about 10 episodes of emesis. Pt denies chest pain, sob, abd pain, diarrhea, numbness, tingling, weakness, neck pain, slurred speech, change in vision, syncope, fever or chills.

## 2021-02-23 NOTE — ED ADULT NURSE REASSESSMENT NOTE - NS ED NURSE REASSESS COMMENT FT1
Pt a&ox3 PMH DM HTN presenting to ED for dizziness starting this am with associated nausea and vomiting. Pt denies cp

## 2021-02-23 NOTE — ED PROVIDER NOTE - ATTENDING CONTRIBUTION TO CARE
Attending note:   After face to face evaluation of this patient, I concur with above noted hx, pe, and care plan for this patient.  Swan: 76 yof with DM, HTN woke up with dizziness described as room spinning in the morning upon getting out of bed. Pt felt she would wall. This was followed by multiple episodes of vomiting and still complaining of nausea. Pt denies any cp, sob, sweating, fever, chills, weakness, numbness, neck pain. Pt appears uncomfortable, but non toxic, PERRL, EOMI, neck soft and supple, clear lungs, normal cardiac, abd soft and nontender, no edema, no calf tn, neuro moving all ext equally strong, sensation intact, unable to perform finger/nose due to it causing symptoms. Pt unable to stand up for further neuro testing. Pt appears to have vertigo likely BPPV but give age will get CT head with angio, labs, EKG. Received a call from pt's PMD and daughter. Symptomatic relief with fluids, antiemetics and meclizine were minimally successful but pt still not able to stand up. Will consult neuro and place in CDU for further monitoring, MRI and possible neuro eval.

## 2021-02-24 VITALS
TEMPERATURE: 99 F | SYSTOLIC BLOOD PRESSURE: 136 MMHG | DIASTOLIC BLOOD PRESSURE: 77 MMHG | HEART RATE: 67 BPM | RESPIRATION RATE: 15 BRPM | OXYGEN SATURATION: 97 %

## 2021-02-24 LAB
ALBUMIN SERPL ELPH-MCNC: 3.9 G/DL — SIGNIFICANT CHANGE UP (ref 3.3–5)
ALP SERPL-CCNC: 121 U/L — HIGH (ref 40–120)
ALT FLD-CCNC: 12 U/L — SIGNIFICANT CHANGE UP (ref 4–33)
ANION GAP SERPL CALC-SCNC: 11 MMOL/L — SIGNIFICANT CHANGE UP (ref 7–14)
AST SERPL-CCNC: 15 U/L — SIGNIFICANT CHANGE UP (ref 4–32)
BASOPHILS # BLD AUTO: 0.13 K/UL — SIGNIFICANT CHANGE UP (ref 0–0.2)
BASOPHILS NFR BLD AUTO: 2.6 % — HIGH (ref 0–2)
BILIRUB SERPL-MCNC: 0.5 MG/DL — SIGNIFICANT CHANGE UP (ref 0.2–1.2)
BUN SERPL-MCNC: 9 MG/DL — SIGNIFICANT CHANGE UP (ref 7–23)
CALCIUM SERPL-MCNC: 9 MG/DL — SIGNIFICANT CHANGE UP (ref 8.4–10.5)
CHLORIDE SERPL-SCNC: 106 MMOL/L — SIGNIFICANT CHANGE UP (ref 98–107)
CHOLEST SERPL-MCNC: 140 MG/DL — SIGNIFICANT CHANGE UP
CO2 SERPL-SCNC: 24 MMOL/L — SIGNIFICANT CHANGE UP (ref 22–31)
CREAT SERPL-MCNC: 0.74 MG/DL — SIGNIFICANT CHANGE UP (ref 0.5–1.3)
EOSINOPHIL # BLD AUTO: 0.13 K/UL — SIGNIFICANT CHANGE UP (ref 0–0.5)
EOSINOPHIL NFR BLD AUTO: 2.6 % — SIGNIFICANT CHANGE UP (ref 0–6)
GLUCOSE SERPL-MCNC: 116 MG/DL — HIGH (ref 70–99)
HCT VFR BLD CALC: 35.2 % — SIGNIFICANT CHANGE UP (ref 34.5–45)
HDLC SERPL-MCNC: 43 MG/DL — LOW
HGB BLD-MCNC: 10.8 G/DL — LOW (ref 11.5–15.5)
IANC: 2.34 K/UL — SIGNIFICANT CHANGE UP (ref 1.5–8.5)
LIPID PNL WITH DIRECT LDL SERPL: 84 MG/DL — SIGNIFICANT CHANGE UP
LYMPHOCYTES # BLD AUTO: 1.52 K/UL — SIGNIFICANT CHANGE UP (ref 1–3.3)
LYMPHOCYTES # BLD AUTO: 30.7 % — SIGNIFICANT CHANGE UP (ref 13–44)
MCHC RBC-ENTMCNC: 22.4 PG — LOW (ref 27–34)
MCHC RBC-ENTMCNC: 30.7 GM/DL — LOW (ref 32–36)
MCV RBC AUTO: 72.9 FL — LOW (ref 80–100)
MONOCYTES # BLD AUTO: 0.35 K/UL — SIGNIFICANT CHANGE UP (ref 0–0.9)
MONOCYTES NFR BLD AUTO: 7 % — SIGNIFICANT CHANGE UP (ref 2–14)
NEUTROPHILS # BLD AUTO: 2.44 K/UL — SIGNIFICANT CHANGE UP (ref 1.8–7.4)
NEUTROPHILS NFR BLD AUTO: 49.1 % — SIGNIFICANT CHANGE UP (ref 43–77)
NON HDL CHOLESTEROL: 97 MG/DL — SIGNIFICANT CHANGE UP
PLATELET # BLD AUTO: 245 K/UL — SIGNIFICANT CHANGE UP (ref 150–400)
POTASSIUM SERPL-MCNC: 3 MMOL/L — LOW (ref 3.5–5.3)
POTASSIUM SERPL-SCNC: 3 MMOL/L — LOW (ref 3.5–5.3)
PROT SERPL-MCNC: 7.1 G/DL — SIGNIFICANT CHANGE UP (ref 6–8.3)
RBC # BLD: 4.83 M/UL — SIGNIFICANT CHANGE UP (ref 3.8–5.2)
RBC # FLD: 14.9 % — HIGH (ref 10.3–14.5)
SARS-COV-2 RNA SPEC QL NAA+PROBE: SIGNIFICANT CHANGE UP
SODIUM SERPL-SCNC: 141 MMOL/L — SIGNIFICANT CHANGE UP (ref 135–145)
TRIGL SERPL-MCNC: 64 MG/DL — SIGNIFICANT CHANGE UP
WBC # BLD: 4.96 K/UL — SIGNIFICANT CHANGE UP (ref 3.8–10.5)
WBC # FLD AUTO: 4.96 K/UL — SIGNIFICANT CHANGE UP (ref 3.8–10.5)

## 2021-02-24 PROCEDURE — 70496 CT ANGIOGRAPHY HEAD: CPT | Mod: 26

## 2021-02-24 PROCEDURE — 70551 MRI BRAIN STEM W/O DYE: CPT | Mod: 26

## 2021-02-24 PROCEDURE — 70498 CT ANGIOGRAPHY NECK: CPT | Mod: 26

## 2021-02-24 RX ORDER — ONDANSETRON 8 MG/1
4 TABLET, FILM COATED ORAL EVERY 4 HOURS
Refills: 0 | Status: DISCONTINUED | OUTPATIENT
Start: 2021-02-24 | End: 2021-02-27

## 2021-02-24 RX ORDER — POTASSIUM CHLORIDE 20 MEQ
40 PACKET (EA) ORAL ONCE
Refills: 0 | Status: COMPLETED | OUTPATIENT
Start: 2021-02-24 | End: 2021-02-24

## 2021-02-24 RX ORDER — MECLIZINE HCL 12.5 MG
1 TABLET ORAL
Qty: 30 | Refills: 0
Start: 2021-02-24

## 2021-02-24 RX ORDER — MECLIZINE HCL 12.5 MG
25 TABLET ORAL EVERY 6 HOURS
Refills: 0 | Status: DISCONTINUED | OUTPATIENT
Start: 2021-02-24 | End: 2021-02-27

## 2021-02-24 RX ADMIN — Medication 40 MILLIEQUIVALENT(S): at 16:03

## 2021-02-24 RX ADMIN — Medication 25 MILLIGRAM(S): at 11:56

## 2021-02-24 NOTE — ED CDU PROVIDER DISPOSITION NOTE - PATIENT PORTAL LINK FT
You can access the FollowMyHealth Patient Portal offered by [facility] by registering at the following website: [email or web address]. By joining [employer]’s FollowMyHealth portal, you will also be able to view your health information using other applications (apps) compatible with our system.

## 2021-02-24 NOTE — PHYSICAL THERAPY INITIAL EVALUATION ADULT - DISCHARGE DISPOSITION, PT EVAL
Anticipate outpatient PT with vestibular specialist Anticipating outpatient vestibular PT. Assessment limited by dizziness, please continue to follow PT notes.

## 2021-02-24 NOTE — PHYSICAL THERAPY INITIAL EVALUATION ADULT - PERTINENT HX OF CURRENT PROBLEM, REHAB EVAL
Patient is a 76 year old female presenting with dizziness. PMH includes DM, HTN, hyperlipidemia, anemia. Patient is a 76 year old female presenting with dizziness. CTA head/neck was negative for any acute pathology. MR head: No acute intracranial hemorrhage or acute infarct. PMH includes DM, HTN, hyperlipidemia, anemia.

## 2021-02-24 NOTE — ED CDU PROVIDER INITIAL DAY NOTE - DETAILS
Tele monitoring, neuro checks, MR head, meclizine PRN, supportive care, general observation care / monitoring.

## 2021-02-24 NOTE — ED CDU PROVIDER DISPOSITION NOTE - ATTENDING CONTRIBUTION TO CARE
agree with PA note    "75 yo female, PMH DM, HTN, hyperlipidemia, anemia, who presented to the ED c/o room spinning dizziness, onset 2/23/21, worse with movement, alleviated by rest, a/w nausea and vomiting.  No hx/o syncope.  In the ED, pt was given symptomatic treatment with meclizine, Reglan, diazepam, and IV fluids; EKG with no acute findings; CTA head/neck was negative for any acute pathology.  Pt was dispo'd to CDU for continued care plan:  Tele monitoring, neuro checks, MR head, meclizine PRN. MRI head was negative for any acute pathology. Patient was seen by neuro who agreed with Meclizine and outpatient PT. Pt was seen by PT as well who recommended outpt PT. Pt was reassessed and able to ambulate comfortably, without assistance.  "    stable for discharge; walking with no difficulty; MRI negative

## 2021-02-24 NOTE — PHYSICAL THERAPY INITIAL EVALUATION ADULT - LEVEL OF INDEPENDENCE: GAIT, REHAB EVAL
Patient unable to perform due to complaints of dizziness. Refer to flowsheets for orthostatics./unable to perform Patient unable to perform due to complaints of dizziness. Symptoms subsided supine in bed. Refer to flowsheets for orthostatic vital signs./unable to perform

## 2021-02-24 NOTE — ED CDU PROVIDER INITIAL DAY NOTE - OBJECTIVE STATEMENT
75 y/o female pmh htn, dm c/o dizziness x today. Pt admits to waking up, getting out of bed and having room spinning dizziness. Pt states that she felt like she was going to fall. Pt laid down and symptoms improved. Pt admit sto recurrent symptoms with positional change. Pt admits to about 10 episodes of emesis. Pt denies chest pain, sob, abd pain, diarrhea, numbness, tingling, weakness, neck pain, slurred speech, change in vision, syncope, fever or chills.    CDU MICHAEL Guzman Note------  ED Provider HPI as above, reviewed.  Pt is a 75 yo female, PMH DM, HTN, hyperlipidemia, anemia, who presented to the ED c/o room spinning dizziness, onset 2/23/21, worse with movement, alleviated by rest, a/w nausea and vomiting.  No hx/o syncope.  In the ED, pt was given symptomatic treatment with meclizine, Reglan, diazepam, and IV fluids; EKG with no acute findings; CTA head/neck was negative for any acute pathology.  Pt was dispo'd to CDU for continued care plan:  Tele monitoring, neuro checks, MR head, meclizine PRN, supportive care, general observation care / monitoring.  On CDU evaluation, pt with no active c/o.  CDU care plan discussed with pt who verbalizes agreement with plan.

## 2021-02-24 NOTE — ED CDU PROVIDER INITIAL DAY NOTE - CROS ED CONS ALL NEG
negative...
Detail Level: Detailed
Consent: The patient's consent was obtained including but not limited to risks of crusting, scabbing, blistering, scarring, darker or lighter pigmentary change, recurrence, incomplete removal and infection.
Number Of Freeze-Thaw Cycles: 2 freeze-thaw cycles
Duration Of Freeze Thaw-Cycle (Seconds): 3
Post-Care Instructions: I reviewed with the patient in detail post-care instructions. Patient is to wear sunprotection, and avoid picking at any of the treated lesions. Pt may apply Vaseline to crusted or scabbing areas.
Render Post-Care Instructions In Note?: no

## 2021-02-24 NOTE — ED CDU PROVIDER INITIAL DAY NOTE - PROGRESS NOTE DETAILS
Patient reassessed, was able to walk with PT assistance. Did not get any Meclizine today. MRI negative. Will trial Meclizine again, hopeful dc home later today if gait improves. Spoke w/ PMD Dr. Brown and neuro attg Dr. Calderon- pt can follow up for outpatient vestibular therapy. Pt reassessed, now able to walk with minimal assistance, feels ready to go home. Will need outpatient vestibular therapy. Spoke w/ daughter and sister, discussed all results and diagnosis of vertigo. State they will sleep with her tonight so that she is not alone. Will dc.

## 2021-02-24 NOTE — CONSULT NOTE ADULT - ASSESSMENT
Sanchez Cristobal is a 76 year old female with a past medical history of HTN, DM, HLD presenting with a 1 day history of dizziness.    Impression:   Acute vestibular syndrome localizing likely to L vestibular system likely etiology is peripheral, due to BPPV. Central etiology unlikely due to negative imaging and HINTS exam showing more peripheral etiology.    Recs:  [x] MRI brain completed as above  [] can continue meclizine 25m tid prn for dizziness  [] caution when walking to avoid falls  [] outpatient vestibular rehab  [] outpatient PT as per PT evaluation  [] encourage adequate hydration and PO intake  [] ENT follow up  [] can follow up with Dr. April Calderno for neurology outpatient    Case discussed with neurology attending, Dr. Calderon.

## 2021-02-24 NOTE — ED CDU PROVIDER DISPOSITION NOTE - CARE PROVIDER_API CALL
April Calderon  NEUROLOGY  [address] [place] [address]  Phone: [phone]  Fax: [phone]  Follow Up Time: 4-6 Days

## 2021-02-24 NOTE — PHYSICAL THERAPY INITIAL EVALUATION ADULT - MANUAL MUSCLE TESTING RESULTS, REHAB EVAL
Bilateral upper extremities grossly 3/5, bilateral lower extremities grossly 4/5/grossly assessed due to Bilateral upper extremities grossly 3/5, bilateral lower extremities grossly 4/5

## 2021-02-24 NOTE — PHYSICAL THERAPY INITIAL EVALUATION ADULT - ADDITIONAL COMMENTS
Patient reported that she lives in a single level home with 5 steps to enter and 1 handrail. Pt did not require the use of an assistive device. Pt reported that she lives in a single level home with 5 steps to enter and 1 handrail. Pt did not require the use of an assistive device.    Patient left semisupine in bed, all lines intact, +call bell in reach, and RN aware.

## 2021-02-24 NOTE — ED CDU PROVIDER INITIAL DAY NOTE - INDICATION FOR OBSERVATION
Tele monitoring, neuro checks, MR head, meclizine PRN, supportive care, general observation care / monitoring./Diagnostic Uncertainty/Therapeutic Intensity/Other

## 2021-02-24 NOTE — ED CDU PROVIDER DISPOSITION NOTE - NSFOLLOWUPINSTRUCTIONS_ED_ALL_ED_FT
See your primary care doctor within 24-48 hours. Follow up with Dr. Calderon (neurology) for further management and to arrange "Vestibular Physical Therapy". Take Meclizine 25mg every 8 hours as needed for vertigo/dizziness. Drink plenty of water. Change your positions slowly to avoid falls. Return to the ER for worsening dizziness, gait imbalance, extremity numbness/weakness/tingling or any other concerns.

## 2021-02-24 NOTE — ED CDU PROVIDER INITIAL DAY NOTE - PHYSICAL EXAMINATION
CONSTITUTIONAL:  Well appearing, awake, alert, oriented to person, place, time/situation and in no apparent distress.  Pt. is objectively comfortable appearing and verbalizing in full, clear, effortless sentences.  ENMT: NC/AT.  Airway patent.  Nasal mucosa clear.  Moist mucous membranes.  Neck supple.  EYES:  Clear OU.  CARDIAC:  Normal rate, regular rhythm.  Heart sounds S1 S2.  No murmurs, gallops, or rubs.  RESPIRATORY:  Breath sounds clear and equal bilaterally.  No wheezes, no rales, no rhonchi.  GASTROINTESTINAL:  Abdomen soft, non-distended, non-tender.  No rebound, no guarding.  NEUROLOGICAL:  Alert and oriented to person/place/time/situation.  No focal deficits; no tremors noted.   MUSCULOSKELETAL:  Range of motion is not limited.  Gait not assessed at this time as pt desiring to sleep due to time of day; cooperation limited on exam due to this.  SKIN:  Skin color unremarkable.  Skin warm, dry, and intact.    PSYCHIATRIC:  Alert and oriented to person/place/time/situation.  Mood and affect WNL.  No apparent risk to self or others.

## 2021-02-24 NOTE — ED CDU PROVIDER DISPOSITION NOTE - CLINICAL COURSE
75 yo female, PMH DM, HTN, hyperlipidemia, anemia, who presented to the ED c/o room spinning dizziness, onset 2/23/21, worse with movement, alleviated by rest, a/w nausea and vomiting.  No hx/o syncope.  In the ED, pt was given symptomatic treatment with meclizine, Reglan, diazepam, and IV fluids; EKG with no acute findings; CTA head/neck was negative for any acute pathology.  Pt was dispo'd to CDU for continued care plan:  Tele monitoring, neuro checks, MR head, meclizine PRN. MRI head was negative for any acute pathology. Patient was seen by neuro who agreed with Meclizine and outpatient PT. Pt was seen by PT as well who recommended outpt PT. Pt was reassessed and able to ambulate comfortably, without assistance.

## 2021-02-24 NOTE — CONSULT NOTE ADULT - SUBJECTIVE AND OBJECTIVE BOX
Chief Complaint:  Patient is a 76y old  Female who presents with a chief complaint of     HPI:    Allergies:  No Known Allergies      Home Medications:  atorvastatin 80 mg oral tablet: 1 tab(s) orally once a day (at bedtime) (15 Leobardo 2018 12:40)  benzocaine-menthol 15 mg-3.6 mg mucous membrane lozenge: 1 lozenge mucous membrane 4 times a day, As Needed for sore throat (15 Leobardo 2018 12:40)  cyanocobalamin 100 mcg/mL injectable solution:  (14 Jun 2018 09:44)  docusate sodium 100 mg oral capsule: 1 cap(s) orally 3 times a day (15 Leobardo 2018 12:40)  insulin glargine: 30 unit(s) subcutaneous once a day (at bedtime) (15 Leobardo 2018 12:40)  Linzess 72 mcg oral capsule: 1 cap(s) orally once a day (14 Jun 2018 09:44)  metFORMIN 1000 mg oral tablet, extended release: 1 tab(s) orally once a day (14 Jun 2018 09:44)  metoprolol tartrate 25 mg oral tablet: 1 tab(s) orally 2 times a day (15 Leobardo 2018 12:40)  omeprazole 40 mg oral delayed release capsule: 1 cap(s) orally once a day (14 Jun 2018 09:44)  oxyCODONE-acetaminophen 5 mg-325 mg oral tablet: 1 tab(s) orally every 6 hours, As needed, moderate and severe pain (15 Leobardo 2018 12:40)  senna oral tablet: 2 tab(s) orally once a day (at bedtime) (15 Leobardo 2018 12:40)  valsartan 160 mg oral tablet: 1 tab(s) orally once a day (15 Leobardo 2018 12:50)        Hospital Medications:    MEDICATIONS  (PRN):  meclizine 25 milliGRAM(s) Oral every 6 hours PRN Dizziness  ondansetron Injectable 4 milliGRAM(s) IV Push every 4 hours PRN Nausea    ___________  Active diet:  Diet, Consistent Carbohydrate/No Snacks:   DASH/TLC Sodium & Cholesterol Restricted (DASH)  No Caffeine  ___________________      PMHX/PSHX:  Anemia    Hypercholesterolemia    Radius fracture    HTN (hypertension), benign    Diabetes mellitus type II    S/P ORIF (open reduction internal fixation) fracture    Breast cyst, left    H/O tubal ligation    History of bunionectomy of left great toe    History of wrist fracture    History of cataract extraction        Family history:  Family history of diabetes mellitus (Sibling)    No pertinent family history in first degree relatives        Social History: Tobacco: [ ] yes  [ ] no  EtOH: [ ] yes  [ ] no  Illicit drugs: denies  Lives with    ROS:     General:  No wt loss, fevers, chills, night sweats, fatigue,   Eyes:  Good vision, no reported pain  ENT:  No sore throat, pain, runny nose, dysphagia  CV:  No pain, palpitations, hypo/hypertension  Resp:  No dyspnea, cough, tachypnea, wheezing  GI:  No pain, No nausea, No vomiting, No diarrhea, No constipation, No weight loss, No fever, No pruritis, No rectal bleeding, No tarry stools, No dysphagia,  :  No pain, bleeding, incontinence, nocturia  Muscle:  No pain, weakness  Neuro:  No weakness, tingling, memory problems  Psych:  No fatigue, insomnia, mood problems, depression  Endocrine:  No polyuria, polydipsia, cold/heat intolerance  Heme:  No petechiae, ecchymosis, easy bruisability  Skin:  No rash, tattoos, scars, edema      PHYSICAL EXAM:   Vital Signs:  Vital Signs Last 24 Hrs  T(C): 37 (24 Feb 2021 15:38), Max: 37 (24 Feb 2021 01:42)  T(F): 98.6 (24 Feb 2021 15:38), Max: 98.6 (24 Feb 2021 01:42)  HR: 67 (24 Feb 2021 15:38) (64 - 84)  BP: 136/77 (24 Feb 2021 15:38) (133/81 - 153/90)  BP(mean): --  RR: 15 (24 Feb 2021 15:38) (14 - 18)  SpO2: 97% (24 Feb 2021 15:38) (97% - 100%)  Daily     Daily     GENERAL:  Appears stated age, well-groomed, well-nourished, no distress  HEENT:  NC/AT,  conjunctivae clear and pink, no thyromegaly, nodules, adenopathy, no JVD, sclera -anicteric  CHEST:  Full & symmetric excursion, no increased effort, breath sounds clear  HEART:  Regular rhythm, S1, S2, no murmur/rub/S3/S4, no abdominal bruit, no edema  ABDOMEN:  Soft, non-tender, non-distended, normoactive bowel sounds,  no masses ,no hepato-splenomegaly, no signs of chronic liver disease  EXTEREMITIES:  no cyanosis,clubbing or edema  SKIN:  No rash/erythema/ecchymoses/petechiae/wounds/abscess/warm/dry  NEURO:  Alert, oriented, no asterixis, no tremor, no encephalopathy  RECTAL: Heme     LABS:                        10.8   4.96  )-----------( 245      ( 24 Feb 2021 06:26 )             35.2     02-24    141  |  106  |  9   ----------------------------<  116<H>  3.0<L>   |  24  |  0.74    Ca    9.0      24 Feb 2021 06:27    TPro  7.1  /  Alb  3.9  /  TBili  0.5  /  DBili  x   /  AST  15  /  ALT  12  /  AlkPhos  121<H>  02-24    LIVER FUNCTIONS - ( 24 Feb 2021 06:27 )  Alb: 3.9 g/dL / Pro: 7.1 g/dL / ALK PHOS: 121 U/L / ALT: 12 U/L / AST: 15 U/L / GGT: x                   Imaging:          
HPI:  Sanchez Cristobal is a 76 year old female with a past medical history of HTN, DM, HLD presenting with a 1 day history of dizziness. At baseline, the patient walks without any assistive devices and takes care of all her ADLs independently. Stated that she had a 1 day history of dizziness that began in the morning of 2/23 when she stood up to walk. She notes that her dizziness is worse when she stands up to walk and lasts for as long as she is up. When she lays down or sits down her dizziness is improved. She describes the dizziness as room spinning. Denies exacerbation of dizziness with head turning. States that she has had nausea and up to 10 episodes of emesis due to her vertigo. She denies other associated symptoms such as tinnitus, fullness in the ears, hearing loss, headaches, rashes, numbness, tingling, dysphagia, dysarthria, weakness. Denies falls or head trauma. Denies recent illnesses or sick contacts.   States that her blood sugars have been in the 200s in the last couple of days but she also endorses poor appetite and decreased oral fluid intake. Denies recent changes to medications and has been otherwise taking her prescribed medications without missing doses.     NIHSS: 0  MRS: 0    REVIEW OF SYSTEMS    A 10-system ROS was performed and is negative except for those items noted above and/or in the HPI.    PAST MEDICAL & SURGICAL HISTORY:  Anemia    Hypercholesterolemia    Radius fracture  left    HTN (hypertension), benign    Diabetes mellitus type II    S/P ORIF (open reduction internal fixation) fracture  left ankle; ~ 5-6 years ago    Breast cyst, left  Excision of left breast cyst; 1964    H/O tubal ligation  1983    History of bunionectomy of left great toe    History of wrist fracture  ORIF left wrist ; 2013    History of cataract extraction  bilateral;  (Left 02/013 - right 03/3013)      FAMILY HISTORY:  Family history of diabetes mellitus (Sibling)    MEDICATIONS (HOME):  Home Medications:  atorvastatin 80 mg oral tablet: 1 tab(s) orally once a day (at bedtime) (15 Leobardo 2018 12:40)  benzocaine-menthol 15 mg-3.6 mg mucous membrane lozenge: 1 lozenge mucous membrane 4 times a day, As Needed for sore throat (15 Leobardo 2018 12:40)  cyanocobalamin 100 mcg/mL injectable solution:  (14 Jun 2018 09:44)  docusate sodium 100 mg oral capsule: 1 cap(s) orally 3 times a day (15 Leobardo 2018 12:40)  insulin glargine: 30 unit(s) subcutaneous once a day (at bedtime) (15 Leobardo 2018 12:40)  Linzess 72 mcg oral capsule: 1 cap(s) orally once a day (14 Jun 2018 09:44)  metFORMIN 1000 mg oral tablet, extended release: 1 tab(s) orally once a day (14 Jun 2018 09:44)  metoprolol tartrate 25 mg oral tablet: 1 tab(s) orally 2 times a day (15 Leobardo 2018 12:40)  omeprazole 40 mg oral delayed release capsule: 1 cap(s) orally once a day (14 Jun 2018 09:44)  oxyCODONE-acetaminophen 5 mg-325 mg oral tablet: 1 tab(s) orally every 6 hours, As needed, moderate and severe pain (15 Leobardo 2018 12:40)  senna oral tablet: 2 tab(s) orally once a day (at bedtime) (15 Leobardo 2018 12:40)  valsartan 160 mg oral tablet: 1 tab(s) orally once a day (15 Leobardo 2018 12:50)    MEDICATIONS  (STANDING):    MEDICATIONS  (PRN):  meclizine 25 milliGRAM(s) Oral every 6 hours PRN Dizziness  ondansetron Injectable 4 milliGRAM(s) IV Push every 4 hours PRN Nausea    ALLERGIES/INTOLERANCES:  Allergies  No Known Allergies    Intolerances    VITALS & EXAMINATION:  Vital Signs Last 24 Hrs  T(C): 36.9 (24 Feb 2021 05:40), Max: 37 (24 Feb 2021 01:42)  T(F): 98.5 (24 Feb 2021 05:40), Max: 98.6 (24 Feb 2021 01:42)  HR: 77 (24 Feb 2021 05:40) (61 - 84)  BP: 134/78 (24 Feb 2021 05:40) (133/83 - 198/84)  BP(mean): --  RR: 18 (24 Feb 2021 05:40) (16 - 18)  SpO2: 99% (24 Feb 2021 05:40) (98% - 100%)    General:  Constitutional: Appears stated age, in no apparent distress including pain  Head: Normocephalic & atraumatic.  ENT: No noted rashes around the ear.     Neurological (>12):  MS: Awake, alert, oriented to person, place, situation, time. Normal affect. Follows all commands including crossed commands.     Language: Speech is clear, fluent with good repetition & comprehension.    CNs: PERRLA (R = 3mm, L = 3mm). VF intact in all 4 quadrants. EOMI with fatigable R beating nystagmus on R and L gaze up to 5-6 beats. No nystagmus on up and down gaze. No nystagmus in primary gaze. V1-3 intact to LT, well developed masseter muscles b/l. No facial asymmetry b/l, full eye closure strength b/l. Hearing grossly normal (rubbing fingers) b/l. Symmetric palate elevation in midline. Shoulder shrug intact b/l. Tongue midline, normal movements, no atrophy.    HINTS EXAM: Mild catch up saccade noted on head impulse test. No skew deviation. No direction changing nystagmus.     Motor: Normal muscle bulk & tone. No noticeable tremor or seizure. No pronator drift.              Deltoid	Biceps	Triceps	Wrist	   R	5-	5-	5-	5-	5-		  L	5-	5-	5-	5-	5-	    	H-Flex	H-Ext	K-Flex	K-Ext	D-Flex	P-Flex  R	5-	5-	5-	5-	5-	5-	   L	5-	5-	5-	5-	5-	5-	     Sensation: Intact to LT b/l throughout.     Reflexes:         Plantar Resp  R	Down   L	Down     Coordination: intact rapid-alt movements. No dysmetria to FTN    Gait: Normal Romberg. No postural instability. Slow and cautious gait but without ataxia.     LABORATORY:  CBC                       10.8   4.96  )-----------( 245      ( 24 Feb 2021 06:26 )             35.2     Chem 02-24    141  |  106  |  9   ----------------------------<  116<H>  3.0<L>   |  24  |  0.74    Ca    9.0      24 Feb 2021 06:27    TPro  7.1  /  Alb  3.9  /  TBili  0.5  /  DBili  x   /  AST  15  /  ALT  12  /  AlkPhos  121<H>  02-24    LFTs LIVER FUNCTIONS - ( 24 Feb 2021 06:27 )  Alb: 3.9 g/dL / Pro: 7.1 g/dL / ALK PHOS: 121 U/L / ALT: 12 U/L / AST: 15 U/L / GGT: x           Coagulopathy   Lipid Panel 02-24 Chol 140 LDL -- HDL 43<L> Trig 64    STUDIES & IMAGING:    Radiology (XR, CT, MR, U/S, TTE/NIRAV):    < from: MR Head No Cont (02.24.21 @ 08:03) >  FINDINGS:  There is mild diffuse parenchymalvolume loss. There are T2 prolongation signal abnormalities in the periventricular subcortical white matter likely related to moderate chronic microvascular ischemic changes.    There is no acute parenchymal hemorrhage, parenchymal mass, mass effect or midline shift. There is no extra-axial fluid collection.  There is no hydrocephalus.  There is no acute infarct. Partial empty sella    Mild mucosal thickening paranasal sinuses    IMPRESSION:  No acute intracranial hemorrhage or acute infarct.    < end of copied text >    < from: CT Angio Head w/ IV Cont (02.24.21 @ 00:49) >  IMPRESSION:    CT brain: There is no evidence of acute intracranial hemorrhage, extra-axial collection, vasogenic edema, mass, mass effect, midline shift, central herniation, hydrocephalus or transcortical infarct.    CT angiography neck: No evidence of hemodynamically significant stenosis by NASCET criteria. No evidence of vascular dissection.    CT angiography brain: No major vessel occlusion or proximal stenosis by NASCET criteria. No evidence of aneurysm or other vascular malformation.    < end of copied text >

## 2021-04-23 NOTE — PROGRESS NOTE ADULT - SUBJECTIVE AND OBJECTIVE BOX
Coronary artery disease is improving with treatment.  Continue current treatment regimen.  Dietary sodium restriction.  Cardiac stress test negative for ischemia  S/p multi-vessel pci.  compelted one year of dapt.  Now on aspirin 81 mg.     Chief Complaint:  Patient is a 76y old  Female who presents with a chief complaint of Saem (23 Feb 2021 22:38)      Interval Events:     Allergies:  No Known Allergies        Home Medications:  atorvastatin 80 mg oral tablet: 1 tab(s) orally once a day (at bedtime) (15 Leobardo 2018 12:40)  benzocaine-menthol 15 mg-3.6 mg mucous membrane lozenge: 1 lozenge mucous membrane 4 times a day, As Needed for sore throat (15 Leobardo 2018 12:40)  cyanocobalamin 100 mcg/mL injectable solution:  (14 Jun 2018 09:44)  docusate sodium 100 mg oral capsule: 1 cap(s) orally 3 times a day (15 Leobardo 2018 12:40)  insulin glargine: 30 unit(s) subcutaneous once a day (at bedtime) (15 Leobardo 2018 12:40)  Linzess 72 mcg oral capsule: 1 cap(s) orally once a day (14 Jun 2018 09:44)  metFORMIN 1000 mg oral tablet, extended release: 1 tab(s) orally once a day (14 Jun 2018 09:44)  metoprolol tartrate 25 mg oral tablet: 1 tab(s) orally 2 times a day (15 Leobardo 2018 12:40)  omeprazole 40 mg oral delayed release capsule: 1 cap(s) orally once a day (14 Jun 2018 09:44)  oxyCODONE-acetaminophen 5 mg-325 mg oral tablet: 1 tab(s) orally every 6 hours, As needed, moderate and severe pain (15 Leobardo 2018 12:40)  senna oral tablet: 2 tab(s) orally once a day (at bedtime) (15 Leobardo 2018 12:40)  valsartan 160 mg oral tablet: 1 tab(s) orally once a day (15 Leobardo 2018 12:50)        Hospital Medications:    MEDICATIONS  (PRN):  meclizine 25 milliGRAM(s) Oral every 6 hours PRN Dizziness  ondansetron Injectable 4 milliGRAM(s) IV Push every 4 hours PRN Nausea    ___________  Active diet:  Diet, Consistent Carbohydrate/No Snacks:   DASH/TLC Sodium & Cholesterol Restricted (DASH)  No Caffeine  ___________________        ROS  GI: [- ] Nausea, [- ] vomiting, [ -]abdominal pain    PHYSICAL EXAM:   Vital Signs:  Vital Signs Last 24 Hrs  T(C): 37 (24 Feb 2021 15:38), Max: 37 (24 Feb 2021 01:42)  T(F): 98.6 (24 Feb 2021 15:38), Max: 98.6 (24 Feb 2021 01:42)  HR: 67 (24 Feb 2021 15:38) (64 - 84)  BP: 136/77 (24 Feb 2021 15:38) (133/81 - 153/90)  BP(mean): --  RR: 15 (24 Feb 2021 15:38) (14 - 18)  SpO2: 97% (24 Feb 2021 15:38) (97% - 100%)  Daily     Daily     GENERAL:  Appears stated age, well-groomed, well-nourished, no distress  HEENT:  NC/AT,  conjunctivae clear and pink, no thyromegaly, nodules, adenopathy, no JVD, sclera -anicteric  CHEST:  Full & symmetric excursion, no increased effort, breath sounds clear  HEART:  Regular rhythm, S1, S2, no murmur/rub/S3/S4, no abdominal bruit, no edema  ABDOMEN:  Soft, non-tender, non-distended, normoactive bowel sounds,  no masses ,no hepato-splenomegaly, no signs of chronic liver disease  EXTEREMITIES:  no cyanosis,clubbing or edema  SKIN:  No rash/erythema/ecchymoses/petechiae/wounds/abscess/warm/dry  NEURO:  Alert, oriented, no asterixis, no tremor, no encephalopathy    LABS:                        10.8   4.96  )-----------( 245      ( 24 Feb 2021 06:26 )             35.2     02-24    141  |  106  |  9   ----------------------------<  116<H>  3.0<L>   |  24  |  0.74    Ca    9.0      24 Feb 2021 06:27    TPro  7.1  /  Alb  3.9  /  TBili  0.5  /  DBili  x   /  AST  15  /  ALT  12  /  AlkPhos  121<H>  02-24    LIVER FUNCTIONS - ( 24 Feb 2021 06:27 )  Alb: 3.9 g/dL / Pro: 7.1 g/dL / ALK PHOS: 121 U/L / ALT: 12 U/L / AST: 15 U/L / GGT: x                   Imaging:

## 2021-06-11 ENCOUNTER — EMERGENCY (EMERGENCY)
Facility: HOSPITAL | Age: 76
LOS: 1 days | Discharge: HOME | End: 2021-06-11
Attending: EMERGENCY MEDICINE | Admitting: EMERGENCY MEDICINE
Payer: COMMERCIAL

## 2021-06-11 VITALS
DIASTOLIC BLOOD PRESSURE: 86 MMHG | RESPIRATION RATE: 15 BRPM | OXYGEN SATURATION: 100 % | TEMPERATURE: 98 F | SYSTOLIC BLOOD PRESSURE: 152 MMHG | HEART RATE: 77 BPM

## 2021-06-11 VITALS
HEART RATE: 93 BPM | OXYGEN SATURATION: 100 % | RESPIRATION RATE: 18 BRPM | DIASTOLIC BLOOD PRESSURE: 70 MMHG | SYSTOLIC BLOOD PRESSURE: 115 MMHG | TEMPERATURE: 98 F | HEIGHT: 66 IN

## 2021-06-11 DIAGNOSIS — Z87.81 PERSONAL HISTORY OF (HEALED) TRAUMATIC FRACTURE: Chronic | ICD-10-CM

## 2021-06-11 DIAGNOSIS — N60.02 SOLITARY CYST OF LEFT BREAST: Chronic | ICD-10-CM

## 2021-06-11 DIAGNOSIS — Z98.51 TUBAL LIGATION STATUS: Chronic | ICD-10-CM

## 2021-06-11 DIAGNOSIS — Z98.89 OTHER SPECIFIED POSTPROCEDURAL STATES: Chronic | ICD-10-CM

## 2021-06-11 DIAGNOSIS — Z96.7 PRESENCE OF OTHER BONE AND TENDON IMPLANTS: Chronic | ICD-10-CM

## 2021-06-11 PROCEDURE — 73562 X-RAY EXAM OF KNEE 3: CPT | Mod: 26,LT

## 2021-06-11 PROCEDURE — 99284 EMERGENCY DEPT VISIT MOD MDM: CPT

## 2021-06-11 PROCEDURE — 70450 CT HEAD/BRAIN W/O DYE: CPT | Mod: 26

## 2021-06-11 PROCEDURE — 73502 X-RAY EXAM HIP UNI 2-3 VIEWS: CPT | Mod: 26,RT

## 2021-06-11 RX ORDER — IBUPROFEN 200 MG
400 TABLET ORAL ONCE
Refills: 0 | Status: COMPLETED | OUTPATIENT
Start: 2021-06-11 | End: 2021-06-11

## 2021-06-11 RX ORDER — ACETAMINOPHEN 500 MG
975 TABLET ORAL ONCE
Refills: 0 | Status: COMPLETED | OUTPATIENT
Start: 2021-06-11 | End: 2021-06-11

## 2021-06-11 RX ORDER — ACETAMINOPHEN 500 MG
1 TABLET ORAL
Qty: 100 | Refills: 0
Start: 2021-06-11

## 2021-06-11 RX ADMIN — Medication 400 MILLIGRAM(S): at 21:39

## 2021-06-11 RX ADMIN — Medication 975 MILLIGRAM(S): at 21:39

## 2021-06-11 NOTE — ED PROVIDER NOTE - CLINICAL SUMMARY MEDICAL DECISION MAKING FREE TEXT BOX
Kelby: Mechanical fall. HA. Consider ICH. Check brain CT. Pain R hip and L knee: check xray. Pain meds.

## 2021-06-11 NOTE — ED ADULT TRIAGE NOTE - CHIEF COMPLAINT QUOTE
Arrives from home had trip and fall this afternoon, no LOC or blood thinner use. Pt reports pain to right side of head and right hip. Per EMS pt ambulated with steady gait without assistance. PMH HTN DM

## 2021-06-11 NOTE — ED PROVIDER NOTE - OBJECTIVE STATEMENT
Kelby: DM, anemia, had mechanical fall today wherein L side of forehead and R hip and L knee hit ground. Not taking anticoag., ASA, NSAIDs. No LOC/neck pain. C/o pain L side of forehead and R hip and L knee.

## 2021-06-11 NOTE — ED PROVIDER NOTE - NSFOLLOWUPINSTRUCTIONS_ED_ALL_ED_FT
Tylenol 500 mg (1 or 2 every 6 hours) and/or naproxen 500 mg (1 every 12 hours) for pain.     Return if pain not controlled with oral medications.

## 2021-06-11 NOTE — ED PROVIDER NOTE - PATIENT PORTAL LINK FT
You can access the FollowMyHealth Patient Portal offered by [facility] by registering at the following website: [email or web address]. By joining [employer]’s FollowMyHealth portal, you will also be able to view your health information using other applications (apps) compatible with our system.

## 2021-06-11 NOTE — ED PROVIDER NOTE - PHYSICAL EXAMINATION
Well appearing, well nourished, awake, alert, oriented to person, place, time/situation and in no apparent distress.    Airway patent. No abrasion or hematoma on scalp.    Eyes without scleral injection. No jaundice.    Strong pulse.    Respirations unlabored.    Abdomen soft, non-tender, no guarding.    MSK: TTP R hip; able to ambulate. Small abrasion (not even to the point of bleeding) and TTP L medial knee.    Alert and oriented, no gross motor or sensory deficits.    Skin normal color for race, warm, dry and intact. No evidence of rash.    No SI/HI.

## 2021-06-11 NOTE — ED PROVIDER NOTE - PMH
Anemia    Diabetes mellitus type II    Headache    HTN (hypertension), benign    Hypercholesterolemia    Radius fracture  left

## 2021-08-30 NOTE — ED PROVIDER NOTE - MUSCULOSKELETAL, MLM
Detail Level: Detailed
Introduction Text (Please End With A Colon): The following procedure was deferred:
Spine appears normal, range of motion is not limited, no muscle or joint tenderness

## 2021-11-10 ENCOUNTER — EMERGENCY (EMERGENCY)
Facility: HOSPITAL | Age: 76
LOS: 1 days | Discharge: HOME | End: 2021-11-10
Attending: STUDENT IN AN ORGANIZED HEALTH CARE EDUCATION/TRAINING PROGRAM | Admitting: STUDENT IN AN ORGANIZED HEALTH CARE EDUCATION/TRAINING PROGRAM
Payer: COMMERCIAL

## 2021-11-10 VITALS
HEIGHT: 66 IN | DIASTOLIC BLOOD PRESSURE: 75 MMHG | OXYGEN SATURATION: 100 % | HEART RATE: 67 BPM | TEMPERATURE: 98 F | SYSTOLIC BLOOD PRESSURE: 140 MMHG | RESPIRATION RATE: 16 BRPM

## 2021-11-10 VITALS
RESPIRATION RATE: 15 BRPM | DIASTOLIC BLOOD PRESSURE: 73 MMHG | OXYGEN SATURATION: 100 % | HEART RATE: 70 BPM | TEMPERATURE: 97 F | SYSTOLIC BLOOD PRESSURE: 165 MMHG

## 2021-11-10 DIAGNOSIS — Z98.51 TUBAL LIGATION STATUS: Chronic | ICD-10-CM

## 2021-11-10 DIAGNOSIS — Z87.81 PERSONAL HISTORY OF (HEALED) TRAUMATIC FRACTURE: Chronic | ICD-10-CM

## 2021-11-10 DIAGNOSIS — N60.02 SOLITARY CYST OF LEFT BREAST: Chronic | ICD-10-CM

## 2021-11-10 DIAGNOSIS — Z98.89 OTHER SPECIFIED POSTPROCEDURAL STATES: Chronic | ICD-10-CM

## 2021-11-10 DIAGNOSIS — Z96.7 PRESENCE OF OTHER BONE AND TENDON IMPLANTS: Chronic | ICD-10-CM

## 2021-11-10 PROBLEM — R51.9 HEADACHE, UNSPECIFIED: Chronic | Status: ACTIVE | Noted: 2021-06-11

## 2021-11-10 LAB
ALBUMIN SERPL ELPH-MCNC: 4.2 G/DL — SIGNIFICANT CHANGE UP (ref 3.3–5)
ALP SERPL-CCNC: 122 U/L — HIGH (ref 40–120)
ALT FLD-CCNC: 15 U/L — SIGNIFICANT CHANGE UP (ref 4–33)
ANION GAP SERPL CALC-SCNC: 11 MMOL/L — SIGNIFICANT CHANGE UP (ref 7–14)
AST SERPL-CCNC: 20 U/L — SIGNIFICANT CHANGE UP (ref 4–32)
BASE EXCESS BLDV CALC-SCNC: -1.5 MMOL/L — SIGNIFICANT CHANGE UP (ref -2–3)
BASE EXCESS BLDV CALC-SCNC: -2.3 MMOL/L — LOW (ref -2–3)
BASOPHILS # BLD AUTO: 0.02 K/UL — SIGNIFICANT CHANGE UP (ref 0–0.2)
BASOPHILS NFR BLD AUTO: 0.4 % — SIGNIFICANT CHANGE UP (ref 0–2)
BILIRUB SERPL-MCNC: 0.3 MG/DL — SIGNIFICANT CHANGE UP (ref 0.2–1.2)
BLOOD GAS VENOUS COMPREHENSIVE RESULT: SIGNIFICANT CHANGE UP
BLOOD GAS VENOUS COMPREHENSIVE RESULT: SIGNIFICANT CHANGE UP
BUN SERPL-MCNC: 15 MG/DL — SIGNIFICANT CHANGE UP (ref 7–23)
CALCIUM SERPL-MCNC: 9.6 MG/DL — SIGNIFICANT CHANGE UP (ref 8.4–10.5)
CHLORIDE BLDV-SCNC: 108 MMOL/L — SIGNIFICANT CHANGE UP (ref 96–108)
CHLORIDE BLDV-SCNC: 111 MMOL/L — HIGH (ref 96–108)
CHLORIDE SERPL-SCNC: 106 MMOL/L — SIGNIFICANT CHANGE UP (ref 98–107)
CO2 BLDV-SCNC: 24.5 MMOL/L — SIGNIFICANT CHANGE UP (ref 22–26)
CO2 BLDV-SCNC: 26.7 MMOL/L — HIGH (ref 22–26)
CO2 SERPL-SCNC: 24 MMOL/L — SIGNIFICANT CHANGE UP (ref 22–31)
CREAT SERPL-MCNC: 0.79 MG/DL — SIGNIFICANT CHANGE UP (ref 0.5–1.3)
EOSINOPHIL # BLD AUTO: 0.13 K/UL — SIGNIFICANT CHANGE UP (ref 0–0.5)
EOSINOPHIL NFR BLD AUTO: 2.9 % — SIGNIFICANT CHANGE UP (ref 0–6)
GAS PNL BLDV: 139 MMOL/L — SIGNIFICANT CHANGE UP (ref 136–145)
GAS PNL BLDV: 140 MMOL/L — SIGNIFICANT CHANGE UP (ref 136–145)
GAS PNL BLDV: SIGNIFICANT CHANGE UP
GAS PNL BLDV: SIGNIFICANT CHANGE UP
GLUCOSE BLDV-MCNC: 147 MG/DL — HIGH (ref 70–99)
GLUCOSE BLDV-MCNC: 93 MG/DL — SIGNIFICANT CHANGE UP (ref 70–99)
GLUCOSE SERPL-MCNC: 144 MG/DL — HIGH (ref 70–99)
HCO3 BLDV-SCNC: 23 MMOL/L — SIGNIFICANT CHANGE UP (ref 22–29)
HCO3 BLDV-SCNC: 25 MMOL/L — SIGNIFICANT CHANGE UP (ref 22–29)
HCT VFR BLD CALC: 35.3 % — SIGNIFICANT CHANGE UP (ref 34.5–45)
HCT VFR BLDA CALC: 31 % — LOW (ref 34.5–46.5)
HCT VFR BLDA CALC: 34 % — LOW (ref 34.5–46.5)
HGB BLD CALC-MCNC: 10.2 G/DL — LOW (ref 11.5–15.5)
HGB BLD CALC-MCNC: 11.2 G/DL — LOW (ref 11.5–15.5)
HGB BLD-MCNC: 11.2 G/DL — LOW (ref 11.5–15.5)
IANC: 2.03 K/UL — SIGNIFICANT CHANGE UP (ref 1.5–8.5)
IMM GRANULOCYTES NFR BLD AUTO: 0.2 % — SIGNIFICANT CHANGE UP (ref 0–1.5)
LACTATE BLDV-MCNC: 1.9 MMOL/L — SIGNIFICANT CHANGE UP (ref 0.5–2)
LACTATE BLDV-MCNC: 2.8 MMOL/L — HIGH (ref 0.5–2)
LYMPHOCYTES # BLD AUTO: 1.88 K/UL — SIGNIFICANT CHANGE UP (ref 1–3.3)
LYMPHOCYTES # BLD AUTO: 42.2 % — SIGNIFICANT CHANGE UP (ref 13–44)
MCHC RBC-ENTMCNC: 23.4 PG — LOW (ref 27–34)
MCHC RBC-ENTMCNC: 31.7 GM/DL — LOW (ref 32–36)
MCV RBC AUTO: 73.7 FL — LOW (ref 80–100)
MONOCYTES # BLD AUTO: 0.38 K/UL — SIGNIFICANT CHANGE UP (ref 0–0.9)
MONOCYTES NFR BLD AUTO: 8.5 % — SIGNIFICANT CHANGE UP (ref 2–14)
NEUTROPHILS # BLD AUTO: 2.03 K/UL — SIGNIFICANT CHANGE UP (ref 1.8–7.4)
NEUTROPHILS NFR BLD AUTO: 45.8 % — SIGNIFICANT CHANGE UP (ref 43–77)
NRBC # BLD: 0 /100 WBCS — SIGNIFICANT CHANGE UP
NRBC # FLD: 0 K/UL — SIGNIFICANT CHANGE UP
PCO2 BLDV: 42 MMHG — SIGNIFICANT CHANGE UP (ref 39–42)
PCO2 BLDV: 50 MMHG — HIGH (ref 39–42)
PH BLDV: 7.31 — LOW (ref 7.32–7.43)
PH BLDV: 7.35 — SIGNIFICANT CHANGE UP (ref 7.32–7.43)
PLATELET # BLD AUTO: 219 K/UL — SIGNIFICANT CHANGE UP (ref 150–400)
PO2 BLDV: 25 MMHG — SIGNIFICANT CHANGE UP
PO2 BLDV: 48 MMHG — SIGNIFICANT CHANGE UP
POTASSIUM BLDV-SCNC: 3.4 MMOL/L — LOW (ref 3.5–5.1)
POTASSIUM BLDV-SCNC: 3.8 MMOL/L — SIGNIFICANT CHANGE UP (ref 3.5–5.1)
POTASSIUM SERPL-MCNC: 3.9 MMOL/L — SIGNIFICANT CHANGE UP (ref 3.5–5.3)
POTASSIUM SERPL-SCNC: 3.9 MMOL/L — SIGNIFICANT CHANGE UP (ref 3.5–5.3)
PROT SERPL-MCNC: 7.7 G/DL — SIGNIFICANT CHANGE UP (ref 6–8.3)
RBC # BLD: 4.79 M/UL — SIGNIFICANT CHANGE UP (ref 3.8–5.2)
RBC # FLD: 14.1 % — SIGNIFICANT CHANGE UP (ref 10.3–14.5)
SAO2 % BLDV: 35.5 % — SIGNIFICANT CHANGE UP
SAO2 % BLDV: 79.1 % — SIGNIFICANT CHANGE UP
SODIUM SERPL-SCNC: 141 MMOL/L — SIGNIFICANT CHANGE UP (ref 135–145)
WBC # BLD: 4.45 K/UL — SIGNIFICANT CHANGE UP (ref 3.8–10.5)
WBC # FLD AUTO: 4.45 K/UL — SIGNIFICANT CHANGE UP (ref 3.8–10.5)

## 2021-11-10 PROCEDURE — 99284 EMERGENCY DEPT VISIT MOD MDM: CPT

## 2021-11-10 PROCEDURE — 76642 ULTRASOUND BREAST LIMITED: CPT | Mod: 26,RT

## 2021-11-10 RX ORDER — CEPHALEXIN 500 MG
500 CAPSULE ORAL ONCE
Refills: 0 | Status: COMPLETED | OUTPATIENT
Start: 2021-11-10 | End: 2021-11-10

## 2021-11-10 RX ORDER — CEPHALEXIN 500 MG
1 CAPSULE ORAL
Qty: 28 | Refills: 0
Start: 2021-11-10 | End: 2021-11-16

## 2021-11-10 RX ORDER — SODIUM CHLORIDE 9 MG/ML
1000 INJECTION INTRAMUSCULAR; INTRAVENOUS; SUBCUTANEOUS ONCE
Refills: 0 | Status: COMPLETED | OUTPATIENT
Start: 2021-11-10 | End: 2021-11-10

## 2021-11-10 RX ADMIN — Medication 500 MILLIGRAM(S): at 19:18

## 2021-11-10 RX ADMIN — SODIUM CHLORIDE 1000 MILLILITER(S): 9 INJECTION INTRAMUSCULAR; INTRAVENOUS; SUBCUTANEOUS at 19:18

## 2021-11-10 NOTE — ED PROVIDER NOTE - NSICDXPASTMEDICALHX_GEN_ALL_CORE_FT
PAST MEDICAL HISTORY:  Anemia     Diabetes mellitus type II     Headache     HTN (hypertension), benign     Hypercholesterolemia     Radius fracture left

## 2021-11-10 NOTE — ED PROVIDER NOTE - OBJECTIVE STATEMENT
77yo F ho dm, htn, hld pw right breast pain and redness since last night, states the area has been very itchy and has been scratching it. no fevers, or chill currently not in pain

## 2021-11-10 NOTE — ED PROVIDER NOTE - PHYSICAL EXAMINATION
Gen: Well appearing in NAD  Head: NC/AT  Neck: trachea midline  Resp:  No distress  Ext: no deformities  Neuro:  A&O appears non focal  Skin:  Warmth and ertyhema right breast above areola  Psych:  Normal affect and mood

## 2021-11-10 NOTE — ED ADULT NURSE NOTE - NSIMPLEMENTINTERV_GEN_ALL_ED
Implemented All Universal Safety Interventions:  [place] to call system. Call bell, personal items and telephone within reach. Instruct patient to call for assistance. Room bathroom lighting operational. Non-slip footwear when patient is off stretcher. Physically safe environment: no spills, clutter or unnecessary equipment. Stretcher in lowest position, wheels locked, appropriate side rails in place.

## 2021-11-10 NOTE — ED PROVIDER NOTE - NSFOLLOWUPINSTRUCTIONS_ED_ALL_ED_FT
1. TAKE ALL MEDICATIONS AS DIRECTED.  Take antibiotics 4 times a day for 7 days  2. FOR PAIN OR FEVER YOU CAN TAKE ACETAMINOPHEN (TYLENOL) AS NEEDED, AS DIRECTED ON PACKAGING.  3. FOLLOW UP WITH YOUR PRIMARY DOCTOR WITHIN 5 DAYS AS DIRECTED.  4. IF YOU HAD LABS OR IMAGING DONE, YOU WERE GIVEN COPIES OF ALL LABS AND/OR IMAGING RESULTS FROM YOUR ER VISIT--PLEASE TAKE THEM WITH YOU TO YOUR FOLLOW UP APPOINTMENTS.  5. IF NEEDED, CALL PATIENT ACCESS SERVICES AT [phone] TO FIND A PRIMARY CARE PHYSICIAN.  OR CALL [phone] TO MAKE AN APPOINTMENT WITH THE CLINIC.  6. RETURN TO THE ER FOR ANY WORSENING SYMPTOMS OR CONCERNS, fevers, chills, worsening redness.    Cellulitis    Cellulitis is a skin infection caused by bacteria. This condition occurs most often in the arms and lower legs but can occur anywhere over the body. Symptoms include redness, swelling, warm skin, tenderness, and chills/fever. If you were prescribed an antibiotic medicine, take it as told by your health care provider. Do not stop taking the antibiotic even if you start to feel better.    SEEK IMMEDIATE MEDICAL CARE IF YOU HAVE ANY OF THE FOLLOWING SYMPTOMS: worsening fever, red streaks coming from affected area, vomiting or diarrhea, or dizziness/lightheadedness.

## 2021-11-10 NOTE — ED ADULT TRIAGE NOTE - CHIEF COMPLAINT QUOTE
Pt T2DM, c/o pain to rt breast radiating to rt arm, intermittent since yesterday, denies SOB, afebrile, denies discharge coming from breast.

## 2021-11-10 NOTE — ED ADULT NURSE NOTE - NSICDXPASTSURGICALHX_GEN_ALL_CORE_FT
PAST SURGICAL HISTORY:  Breast cyst, left Excision of left breast cyst; 1964    H/O tubal ligation 1983    History of bunionectomy of left great toe     History of cataract extraction bilateral;  (Left 02/013 - right 03/3013)    History of wrist fracture ORIF left wrist ; 2013    S/P ORIF (open reduction internal fixation) fracture left ankle; ~ 5-6 years ago

## 2021-11-10 NOTE — ED PROVIDER NOTE - PATIENT PORTAL LINK FT
You can access the FollowMyHealth Patient Portal offered by [facility] by registering at the following website: [email or web address]. By joining [employer]’s FollowMyHealth portal, you will also be able to view your health information using other applications (apps) compatible with our system.

## 2021-11-10 NOTE — ED ADULT NURSE NOTE - OBJECTIVE STATEMENT
pt received to room 2, a&ox4 , ambulatory , pmh of DM TII Managed by metformin and insulin, Htn, Hyperlipidemia,  , p/w breast pain on the right side. pt. states the pain started yesterday when she was in her kitchen, and she rates the pain at an 8. pt states that the pain radiated down to her right arm and didn't last for long, but after the pain went away her arm and right breast were itchy. pt. endorses the pain coming back today, and she decided to come to the ER. upon assessment, erythema noted on the right breast superior to the nipple, and redness noted on the inside of the right arm. Skin is not warm to touch, nontender. otherwise, skin is unremarkable. Pt breathing even and unlabored on room air. NSR on cardiac monitor. Denies fever, chills, cough, SOB, chest pain, palpitations, dizziness, N/V/D, constipation, numbness, tingling. 20 g IV placed in the R AC. Labs collected and sent. EKG in chart. pending . pt educated on fall precautions and confirms understanding via teach back method. Stretcher locked in lowest position with siderails up x2. Call bell and personal items within reach.

## 2021-11-10 NOTE — ED PROVIDER NOTE - NSICDXPASTSURGICALHX_GEN_ALL_CORE_FT
PAST SURGICAL HISTORY:  Breast cyst, left Excision of left breast cyst; 1964    H/O tubal ligation 1983    History of bunionectomy of left great toe     History of cataract extraction bilateral;  (Left 02/013 - right 03/3013)    History of wrist fracture ORIF left wrist ; 2013    S/P ORIF (open reduction internal fixation) fracture left ankle; ~ 5-6 years ago     32.3

## 2021-12-01 ENCOUNTER — EMERGENCY (EMERGENCY)
Facility: HOSPITAL | Age: 76
LOS: 0 days | Discharge: HOME | End: 2021-12-01
Attending: STUDENT IN AN ORGANIZED HEALTH CARE EDUCATION/TRAINING PROGRAM
Payer: COMMERCIAL

## 2021-12-01 VITALS
SYSTOLIC BLOOD PRESSURE: 153 MMHG | RESPIRATION RATE: 16 BRPM | WEIGHT: 175.93 LBS | TEMPERATURE: 98 F | DIASTOLIC BLOOD PRESSURE: 94 MMHG | HEART RATE: 72 BPM | HEIGHT: 66 IN | OXYGEN SATURATION: 98 %

## 2021-12-01 DIAGNOSIS — E11.9 TYPE 2 DIABETES MELLITUS WITHOUT COMPLICATIONS: ICD-10-CM

## 2021-12-01 DIAGNOSIS — M25.532 PAIN IN LEFT WRIST: ICD-10-CM

## 2021-12-01 DIAGNOSIS — I10 ESSENTIAL (PRIMARY) HYPERTENSION: ICD-10-CM

## 2021-12-01 DIAGNOSIS — R51.9 HEADACHE, UNSPECIFIED: ICD-10-CM

## 2021-12-01 DIAGNOSIS — Z79.4 LONG TERM (CURRENT) USE OF INSULIN: ICD-10-CM
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
## 2021-12-01 DIAGNOSIS — S52.90XA UNSPECIFIED FRACTURE OF UNSPECIFIED FOREARM, INITIAL ENCOUNTER FOR CLOSED FRACTURE: Chronic | ICD-10-CM

## 2021-12-01 PROCEDURE — 73130 X-RAY EXAM OF HAND: CPT | Mod: 26,LT

## 2021-12-01 PROCEDURE — 99284 EMERGENCY DEPT VISIT MOD MDM: CPT

## 2021-12-01 PROCEDURE — 70486 CT MAXILLOFACIAL W/O DYE: CPT | Mod: 26,MA

## 2021-12-01 PROCEDURE — 70450 CT HEAD/BRAIN W/O DYE: CPT | Mod: 26,MA

## 2021-12-01 PROCEDURE — 73110 X-RAY EXAM OF WRIST: CPT | Mod: 26,LT

## 2021-12-01 PROCEDURE — 72125 CT NECK SPINE W/O DYE: CPT | Mod: 26,MA

## 2021-12-01 PROCEDURE — 71045 X-RAY EXAM CHEST 1 VIEW: CPT | Mod: 26

## 2021-12-01 PROCEDURE — 72170 X-RAY EXAM OF PELVIS: CPT | Mod: 26

## 2021-12-01 PROCEDURE — 73090 X-RAY EXAM OF FOREARM: CPT | Mod: 26,LT

## 2021-12-01 RX ORDER — IBUPROFEN 200 MG
400 TABLET ORAL ONCE
Refills: 0 | Status: COMPLETED | OUTPATIENT
Start: 2021-12-01 | End: 2021-12-01

## 2021-12-01 RX ORDER — ACETAMINOPHEN 500 MG
975 TABLET ORAL ONCE
Refills: 0 | Status: COMPLETED | OUTPATIENT
Start: 2021-12-01 | End: 2021-12-01

## 2021-12-01 RX ADMIN — Medication 400 MILLIGRAM(S): at 17:10

## 2021-12-01 RX ADMIN — Medication 975 MILLIGRAM(S): at 17:10

## 2021-12-01 NOTE — ED ADULT NURSE NOTE - OBJECTIVE STATEMENT
Pt presents to ED c/o left wrist pain s/p trip and fall today. Pt denies CP, SOB, dizziness, palpitations, lightheadedness, N/V/D, HA. Some swelling noted to left wrist. Pt has no other complaints. No lacerations.

## 2021-12-01 NOTE — ED ADULT NURSE REASSESSMENT NOTE - NS ED NURSE REASSESS COMMENT FT1
Patient seen in wheel chair in waiting area, no new complaints, she was assisted to bathroom by RN safely, pending md's evaluation.

## 2021-12-01 NOTE — ED PROVIDER NOTE - NSFOLLOWUPINSTRUCTIONS_ED_ALL_ED_FT
Take Tylenol 650mg (Two 325 mg pills) every 4-6 hours as needed for pain.        Fall Prevention for Older Adults    WHAT YOU NEED TO KNOW:    As you age, your muscles weaken and your risk for falls increases. Your risk also increases if you take medicines that make you sleepy or dizzy. You may also be at risk if you have vision or joint problems, have low blood pressure, or are not active.    DISCHARGE INSTRUCTIONS:    Call 911 or have someone else call if:   •You have fallen and are unconscious.      •You have fallen and cannot move part of your body.      Contact your healthcare provider if:   •You have fallen and have pain or a headache.      •You have questions or concerns about your condition or care.      Fall prevention tips:   •Stay active. Exercise can help strengthen your muscles and improve your balance. Your healthcare provider may recommend water aerobics, walking, or Nate Chi. He or she may also recommend physical therapy to improve your coordination. Never start an exercise program without asking your healthcare provider first.  Water Aerobics for Seniors       Nate Chi for Seniors           •Wear shoes that fit well and have soles that . Wear shoes both inside and outside. Use slippers with good . Avoid shoes with high heels.      •Use assistive devices as directed. Your healthcare provider may suggest that you use a cane or walker to help you keep your balance. You may need to have grab bars put in your bathroom near the toilet or in the shower.      •Stand or sit up slowly. This may help you keep your balance and prevent falls.      •Wear a personal alarm. This is a device that allows you to call 911 if you need help. Ask for more information on personal alarms.      •Manage your medical conditions.  Keep all appointments with your healthcare providers. Visit your eye doctor as directed.      Home safety tips:     Fall Prevention for Seniors     •Add items to prevent falls in the bathroom. Put nonslip strips on your bath or shower floor to prevent you from slipping. Use a bath mat if you do not have carpet in the bathroom. This will prevent you from falling when you step out of the bath or shower. Use a shower seat so you do not need to stand while you shower. Sit on the toilet or a chair in your bathroom to dry yourself and put on clothing. This will prevent you from losing your balance from drying or dressing yourself while you are standing.      •Keep paths clear. Remove books, shoes, and other objects from walkways and stairs. Place cords for telephones and lamps out of the way so that you do not need to walk over them. Tape them down if you cannot move them. Remove small rugs. If you cannot remove a rug, secure it with double-sided tape. This will prevent you from tripping.      •Install bright lights in your home. Use night lights to help light paths to the bathroom or kitchen. Always turn on the light before you start walking.      •Keep items you use often on shelves within reach. Do not use a step stool to help you reach an item.      •Paint or place reflective tape on the edges of your stairs. This will help you see the stairs better.      Follow up with your doctor as directed: Write down your questions so you remember to ask them during your visits.

## 2021-12-01 NOTE — ED PROVIDER NOTE - PATIENT PORTAL LINK FT
You can access the FollowMyHealth Patient Portal offered by [facility] by registering at the following website: [email or web address]. By joining [employer]’s FollowMyHealth portal, you will also be able to view your health information using other applications (apps) compatible with our system.

## 2021-12-01 NOTE — ED PROVIDER NOTE - PHYSICAL EXAMINATION
VITALS: reviewed  GEN: NAD, A & O x 4  HEAD/EYES: ttp over L maxilla/zygomatic arch, EOMI, anicteric sclerae, no conjunctival pallor  ENT: mucus membranes moist, oropharynx WNL, trachea midline  RESP: lungs CTA with equal breath sounds bilaterally, chest wall nontender and atraumatic  CV: heart with reg rhythm S1, S2, distal pulses intact and symmetric bilaterally  ABDOMEN: normoactive bowel sounds, soft, nondistended, nontender, no palpable masses  : no CVAT  MSK: tenderness over ulnar aspect L wrist. the back is without midline or lateral tenderness, there is no spinal deformity or stepoff and the back is ranged painlessly. the neck has no midline tenderness, deformity, or stepoff, and is ranged painlessly.  SKIN: warm, dry, no rash, no bruising, no cyanosis. color appropriate for ethnicity  NEURO: alert, mentating appropriately, no facial asymmetry. gross sensation, motor, coordination are intact  PSYCH: Affect appropriate

## 2021-12-01 NOTE — ED ADULT TRIAGE NOTE - CHIEF COMPLAINT QUOTE
as per patient c/o L wrist, knee and facial pain s/t fall on street, states tripped, denies LOC. Hx: DM, HTN. axo4.

## 2021-12-01 NOTE — ED PROVIDER NOTE - OBJECTIVE STATEMENT
77 yo F hx HTN, DM2, presenting with complaints of L wrist pain and facial pain s/p mechanical fall. Denies LOC. Not on AC. No visual changes, nausea/vomiting. Ambulatory after fall. No cp/sob.

## 2021-12-23 NOTE — PROVIDER CONTACT NOTE (OTHER) - ASSESSMENT
· Patient was sleeping in recliner and woke up on floor on left side +LOC and +head trauma  Arrived to ED via EMS  · Patient with known cardiac history, LBBB, and questionable ST changes on EMS 12 lead  · In ED, patient dizzy with movement and elevated trop, denies any CP  · CT face - no acute fractures  · CT head - no acute intracranial abnormality  · Bruising noted on left side of face    Patient denies taking blood thinners but reports taking Aspirin for pain  · Neuro checks Q4  · Check orthostatic BP  · Cardiology consulted for elevated trop Pt asymptomatic, denies cold sweats/ chills. Pt states, "I didn't eat anything all day."

## 2022-01-07 NOTE — PHYSICAL THERAPY INITIAL EVALUATION ADULT - LEVEL OF INDEPENDENCE: SIT/SUPINE, REHAB EVAL
Subjective:     Chief Complaint: .GINNY/MDD    HPI   The patient location is:  patient's home  The chief complaint leading to consultation is:  GINNY/MDD  Visit type: Virtual visit with synchronous audio and video  Total time spent with patient:  25 mins   Each patient to whom he or she provides medical services by telemedicine is:  (1) informed of the relationship between the physician and patient and the respective role of any other health care provider with respect to management of the patient; and (2) notified that he or she may decline to receive medical services by telemedicine and may withdraw from such care at any time.  Patient agreed to this telemedicine visit today in an effort to avoid face-to-face visits due to the current COVID 19 pandemic.     Mr. Amol North is a 38-year-old man presenting for interval follow-up of anxiety/depression:     Insomnia:  -  continues nightly Atarax with weaning affect with additional Wellbutrin     Depression/anxiety:  -transitioned previously provided Prozac (never initiated) to Wellbutrin due to suspicion for concomitant/underlying ADD/ADHD; depression and concentration are markedly improved and he is interested in taking a higher dose for this affect; however, underlying anxiety and frequency of panic attacks (now once weekly) have worsened.  -anxiety is responsive to p.r.n. self-directed THC use    Review of Systems   Constitutional: Negative for appetite change, chills and fever.   HENT: Negative.    Respiratory: Negative for cough, chest tightness and shortness of breath.    Cardiovascular: Negative for chest pain, palpitations and leg swelling.   Gastrointestinal: Negative for abdominal distention, abdominal pain, blood in stool, constipation, diarrhea, nausea and vomiting.   Endocrine: Negative.    Genitourinary: Negative for difficulty urinating, dysuria, frequency and hematuria.   Musculoskeletal: Negative.    Integumentary:  Negative.   Neurological:  Negative.    Psychiatric/Behavioral: Positive for decreased concentration and sleep disturbance. The patient is nervous/anxious.          Objective:      There were no vitals filed for this visit.   Physical Exam  Vitals reviewed: Unable to obtain given constraints of telemedicine.       Current Outpatient Medications on File Prior to Visit   Medication Sig Dispense Refill    albuterol (VENTOLIN HFA) 90 mcg/actuation inhaler Inhale 2 puffs into the lungs every 4 (four) hours as needed for Wheezing. Rescue 18 g 5    ALBUTEROL, BULK, MISC       FLUoxetine 20 MG capsule Take 1 capsule (20 mg total) by mouth once daily. (Patient not taking: Reported on 6/11/2021) 30 capsule 11    fluticasone propionate (FLONASE) 50 mcg/actuation nasal spray 1 spray (50 mcg total) by Each Nostril route once daily. 16 g 6    fluticasone-salmeterol diskus inhaler 250-50 mcg Inhale 1 puff into the lungs 2 (two) times a day. (Patient not taking: Reported on 6/11/2021) 1 each 5    hydrOXYzine (VISTARIL) 25 mg/mL injection       hydrOXYzine pamoate (VISTARIL) 25 MG Cap TAKE 1 CAPSULE (25 MG TOTAL) BY MOUTH EVERY 8 (EIGHT) HOURS AS NEEDED. 90 capsule 4    montelukast (SINGULAIR) 10 mg tablet Take 1 tablet (10 mg total) by mouth every evening. 90 tablet 5    pantoprazole (PROTONIX) 40 MG tablet Take 1 tablet (40 mg total) by mouth once daily. (Patient not taking: Reported on 6/11/2021) 30 tablet 11    [DISCONTINUED] buPROPion (WELLBUTRIN SR) 150 MG TBSR 12 hr tablet Take 1 tablet (150 mg total) by mouth 2 (two) times daily. 60 tablet 11     No current facility-administered medications on file prior to visit.         Assessment:       1. Moderate episode of recurrent major depressive disorder    2. Generalized anxiety disorder with panic attacks        Plan:       Moderate episode of recurrent major depressive disorder   - effectively treated with Wellbutrin; per patient request (and at the direction of his treating psychologist) will  up titrate to 150 mg once daily (as opposed to increasing twice daily dose); patient may further up titrate to 300 mg once daily if further worsening of underlying anxiety/panic attacks are not induced and further improvement of underlying depression is warranted.     Generalized anxiety disorder with panic attacks   -  given breakthrough anxiety attacks and presumptive impending worsening of such without titration Wellbutrin, trial of low-dose Xanax seems warranted.  0.25 b.i.d. (1 of which may be used as a sleep aid) called in    Other orders  -     buPROPion (WELLBUTRIN XL) 150 MG TB24 tablet; Take 1 tablet (150 mg total) by mouth once daily.  Dispense: 30 tablet; Refill: 11  -     ALPRAZolam (XANAX) 0.25 MG tablet; Take 1 tablet (0.25 mg total) by mouth 2 (two) times daily as needed for Insomnia or Anxiety.  Dispense: 90 tablet; Refill: 2            independent

## 2022-03-09 ENCOUNTER — APPOINTMENT (OUTPATIENT)
Dept: OPHTHALMOLOGY | Facility: CLINIC | Age: 77
End: 2022-03-09
Payer: COMMERCIAL

## 2022-03-09 ENCOUNTER — NON-APPOINTMENT (OUTPATIENT)
Age: 77
End: 2022-03-09

## 2022-03-09 PROCEDURE — 92015 DETERMINE REFRACTIVE STATE: CPT

## 2022-03-09 PROCEDURE — 92202 OPSCPY EXTND ON/MAC DRAW: CPT

## 2022-03-09 PROCEDURE — 92004 COMPRE OPH EXAM NEW PT 1/>: CPT

## 2022-03-09 PROCEDURE — 92025 CPTRIZED CORNEAL TOPOGRAPHY: CPT

## 2022-03-09 PROCEDURE — 92286 ANT SGM IMG I&R SPECLR MIC: CPT

## 2022-06-12 ENCOUNTER — INPATIENT (INPATIENT)
Facility: HOSPITAL | Age: 77
LOS: 2 days | Discharge: HOME | End: 2022-06-15
Attending: INTERNAL MEDICINE | Admitting: INTERNAL MEDICINE
Payer: COMMERCIAL

## 2022-06-12 VITALS
SYSTOLIC BLOOD PRESSURE: 138 MMHG | HEIGHT: 66 IN | DIASTOLIC BLOOD PRESSURE: 83 MMHG | OXYGEN SATURATION: 98 % | TEMPERATURE: 98 F | HEART RATE: 80 BPM | RESPIRATION RATE: 16 BRPM | WEIGHT: 177.91 LBS

## 2022-06-12 DIAGNOSIS — S52.90XA UNSPECIFIED FRACTURE OF UNSPECIFIED FOREARM, INITIAL ENCOUNTER FOR CLOSED FRACTURE: Chronic | ICD-10-CM

## 2022-06-12 DIAGNOSIS — R42 DIZZINESS AND GIDDINESS: ICD-10-CM

## 2022-06-12 DIAGNOSIS — E11.9 TYPE 2 DIABETES MELLITUS WITHOUT COMPLICATIONS: ICD-10-CM

## 2022-06-12 LAB
ALBUMIN SERPL ELPH-MCNC: 3.4 G/DL — SIGNIFICANT CHANGE UP (ref 3.3–5)
ALP SERPL-CCNC: 124 U/L — HIGH (ref 40–120)
ALT FLD-CCNC: 23 U/L — SIGNIFICANT CHANGE UP (ref 12–78)
ANION GAP SERPL CALC-SCNC: 10 MMOL/L — SIGNIFICANT CHANGE UP (ref 5–17)
APPEARANCE UR: CLEAR — SIGNIFICANT CHANGE UP
APTT BLD: 30.4 SEC — SIGNIFICANT CHANGE UP (ref 27.5–35.5)
AST SERPL-CCNC: 33 U/L — SIGNIFICANT CHANGE UP (ref 15–37)
BACTERIA # UR AUTO: ABNORMAL
BASOPHILS # BLD AUTO: 0.02 K/UL — SIGNIFICANT CHANGE UP (ref 0–0.2)
BASOPHILS NFR BLD AUTO: 0.4 % — SIGNIFICANT CHANGE UP (ref 0–2)
BILIRUB SERPL-MCNC: 0.6 MG/DL — SIGNIFICANT CHANGE UP (ref 0.2–1.2)
BILIRUB UR-MCNC: NEGATIVE — SIGNIFICANT CHANGE UP
BUN SERPL-MCNC: 10 MG/DL — SIGNIFICANT CHANGE UP (ref 7–23)
CALCIUM SERPL-MCNC: 8.7 MG/DL — SIGNIFICANT CHANGE UP (ref 8.5–10.1)
CHLORIDE SERPL-SCNC: 108 MMOL/L — SIGNIFICANT CHANGE UP (ref 96–108)
CO2 SERPL-SCNC: 21 MMOL/L — LOW (ref 22–31)
COLOR SPEC: YELLOW — SIGNIFICANT CHANGE UP
CREAT SERPL-MCNC: 0.84 MG/DL — SIGNIFICANT CHANGE UP (ref 0.5–1.3)
DIFF PNL FLD: NEGATIVE — SIGNIFICANT CHANGE UP
EGFR: 72 ML/MIN/1.73M2 — SIGNIFICANT CHANGE UP
EOSINOPHIL # BLD AUTO: 0.02 K/UL — SIGNIFICANT CHANGE UP (ref 0–0.5)
EOSINOPHIL NFR BLD AUTO: 0.4 % — SIGNIFICANT CHANGE UP (ref 0–6)
EPI CELLS # UR: SIGNIFICANT CHANGE UP
FLUAV AG NPH QL: SIGNIFICANT CHANGE UP
FLUBV AG NPH QL: SIGNIFICANT CHANGE UP
GLUCOSE BLDC GLUCOMTR-MCNC: 125 MG/DL — HIGH (ref 70–99)
GLUCOSE BLDC GLUCOMTR-MCNC: 165 MG/DL — HIGH (ref 70–99)
GLUCOSE BLDC GLUCOMTR-MCNC: 178 MG/DL — HIGH (ref 70–99)
GLUCOSE SERPL-MCNC: 182 MG/DL — HIGH (ref 70–99)
GLUCOSE UR QL: 100 MG/DL
HCT VFR BLD CALC: 34.5 % — SIGNIFICANT CHANGE UP (ref 34.5–45)
HGB BLD-MCNC: 11 G/DL — LOW (ref 11.5–15.5)
IMM GRANULOCYTES NFR BLD AUTO: 0.2 % — SIGNIFICANT CHANGE UP (ref 0–1.5)
INR BLD: 1.05 RATIO — SIGNIFICANT CHANGE UP (ref 0.88–1.16)
KETONES UR-MCNC: NEGATIVE — SIGNIFICANT CHANGE UP
LEUKOCYTE ESTERASE UR-ACNC: ABNORMAL
LYMPHOCYTES # BLD AUTO: 1.51 K/UL — SIGNIFICANT CHANGE UP (ref 1–3.3)
LYMPHOCYTES # BLD AUTO: 33.4 % — SIGNIFICANT CHANGE UP (ref 13–44)
MANUAL SMEAR VERIFICATION: YES — SIGNIFICANT CHANGE UP
MCHC RBC-ENTMCNC: 23.4 PG — LOW (ref 27–34)
MCHC RBC-ENTMCNC: 31.9 G/DL — LOW (ref 32–36)
MCV RBC AUTO: 73.4 FL — LOW (ref 80–100)
MONOCYTES # BLD AUTO: 0.26 K/UL — SIGNIFICANT CHANGE UP (ref 0–0.9)
MONOCYTES NFR BLD AUTO: 5.8 % — SIGNIFICANT CHANGE UP (ref 2–14)
NEUTROPHILS # BLD AUTO: 2.7 K/UL — SIGNIFICANT CHANGE UP (ref 1.8–7.4)
NEUTROPHILS NFR BLD AUTO: 59.8 % — SIGNIFICANT CHANGE UP (ref 43–77)
NITRITE UR-MCNC: NEGATIVE — SIGNIFICANT CHANGE UP
NRBC # BLD: 0 /100 WBCS — SIGNIFICANT CHANGE UP (ref 0–0)
PH UR: 7 — SIGNIFICANT CHANGE UP (ref 5–8)
PLAT MORPH BLD: NORMAL — SIGNIFICANT CHANGE UP
PLATELET # BLD AUTO: 223 K/UL — SIGNIFICANT CHANGE UP (ref 150–400)
POTASSIUM SERPL-MCNC: 3.9 MMOL/L — SIGNIFICANT CHANGE UP (ref 3.5–5.3)
POTASSIUM SERPL-SCNC: 3.9 MMOL/L — SIGNIFICANT CHANGE UP (ref 3.5–5.3)
PROT SERPL-MCNC: 7.6 GM/DL — SIGNIFICANT CHANGE UP (ref 6–8.3)
PROT UR-MCNC: 15 MG/DL
PROTHROM AB SERPL-ACNC: 12.5 SEC — SIGNIFICANT CHANGE UP (ref 10.5–13.4)
RBC # BLD: 4.7 M/UL — SIGNIFICANT CHANGE UP (ref 3.8–5.2)
RBC # FLD: 15.3 % — HIGH (ref 10.3–14.5)
RBC BLD AUTO: SIGNIFICANT CHANGE UP
RBC CASTS # UR COMP ASSIST: SIGNIFICANT CHANGE UP /HPF (ref 0–4)
SARS-COV-2 RNA SPEC QL NAA+PROBE: SIGNIFICANT CHANGE UP
SODIUM SERPL-SCNC: 139 MMOL/L — SIGNIFICANT CHANGE UP (ref 135–145)
SP GR SPEC: 1 — LOW (ref 1.01–1.02)
TROPONIN I, HIGH SENSITIVITY RESULT: 4.3 NG/L — SIGNIFICANT CHANGE UP
UROBILINOGEN FLD QL: NEGATIVE MG/DL — SIGNIFICANT CHANGE UP
WBC # BLD: 4.52 K/UL — SIGNIFICANT CHANGE UP (ref 3.8–10.5)
WBC # FLD AUTO: 4.52 K/UL — SIGNIFICANT CHANGE UP (ref 3.8–10.5)
WBC UR QL: SIGNIFICANT CHANGE UP

## 2022-06-12 PROCEDURE — 70450 CT HEAD/BRAIN W/O DYE: CPT | Mod: 26,MA

## 2022-06-12 PROCEDURE — 71045 X-RAY EXAM CHEST 1 VIEW: CPT | Mod: 26

## 2022-06-12 PROCEDURE — 93010 ELECTROCARDIOGRAM REPORT: CPT

## 2022-06-12 PROCEDURE — 99285 EMERGENCY DEPT VISIT HI MDM: CPT

## 2022-06-12 RX ORDER — DEXTROSE 50 % IN WATER 50 %
25 SYRINGE (ML) INTRAVENOUS ONCE
Refills: 0 | Status: DISCONTINUED | OUTPATIENT
Start: 2022-06-12 | End: 2022-06-15

## 2022-06-12 RX ORDER — INFLUENZA VIRUS VACCINE 15; 15; 15; 15 UG/.5ML; UG/.5ML; UG/.5ML; UG/.5ML
0.7 SUSPENSION INTRAMUSCULAR ONCE
Refills: 0 | Status: DISCONTINUED | OUTPATIENT
Start: 2022-06-12 | End: 2022-06-15

## 2022-06-12 RX ORDER — DEXTROSE 50 % IN WATER 50 %
15 SYRINGE (ML) INTRAVENOUS ONCE
Refills: 0 | Status: DISCONTINUED | OUTPATIENT
Start: 2022-06-12 | End: 2022-06-15

## 2022-06-12 RX ORDER — METOPROLOL TARTRATE 50 MG
50 TABLET ORAL DAILY
Refills: 0 | Status: DISCONTINUED | OUTPATIENT
Start: 2022-06-12 | End: 2022-06-15

## 2022-06-12 RX ORDER — PANTOPRAZOLE SODIUM 20 MG/1
40 TABLET, DELAYED RELEASE ORAL ONCE
Refills: 0 | Status: COMPLETED | OUTPATIENT
Start: 2022-06-12 | End: 2022-06-12

## 2022-06-12 RX ORDER — ACETAMINOPHEN 500 MG
650 TABLET ORAL EVERY 6 HOURS
Refills: 0 | Status: DISCONTINUED | OUTPATIENT
Start: 2022-06-12 | End: 2022-06-15

## 2022-06-12 RX ORDER — ONDANSETRON 8 MG/1
4 TABLET, FILM COATED ORAL EVERY 6 HOURS
Refills: 0 | Status: DISCONTINUED | OUTPATIENT
Start: 2022-06-12 | End: 2022-06-15

## 2022-06-12 RX ORDER — METOCLOPRAMIDE HCL 10 MG
10 TABLET ORAL ONCE
Refills: 0 | Status: COMPLETED | OUTPATIENT
Start: 2022-06-12 | End: 2022-06-12

## 2022-06-12 RX ORDER — ATORVASTATIN CALCIUM 80 MG/1
80 TABLET, FILM COATED ORAL AT BEDTIME
Refills: 0 | Status: DISCONTINUED | OUTPATIENT
Start: 2022-06-12 | End: 2022-06-13

## 2022-06-12 RX ORDER — SODIUM CHLORIDE 9 MG/ML
1000 INJECTION, SOLUTION INTRAVENOUS
Refills: 0 | Status: DISCONTINUED | OUTPATIENT
Start: 2022-06-12 | End: 2022-06-15

## 2022-06-12 RX ORDER — PANTOPRAZOLE SODIUM 20 MG/1
40 TABLET, DELAYED RELEASE ORAL
Refills: 0 | Status: DISCONTINUED | OUTPATIENT
Start: 2022-06-12 | End: 2022-06-15

## 2022-06-12 RX ORDER — VALSARTAN 80 MG/1
160 TABLET ORAL DAILY
Refills: 0 | Status: DISCONTINUED | OUTPATIENT
Start: 2022-06-12 | End: 2022-06-15

## 2022-06-12 RX ORDER — INSULIN LISPRO 100/ML
VIAL (ML) SUBCUTANEOUS
Refills: 0 | Status: DISCONTINUED | OUTPATIENT
Start: 2022-06-12 | End: 2022-06-15

## 2022-06-12 RX ORDER — INSULIN GLARGINE 100 [IU]/ML
30 INJECTION, SOLUTION SUBCUTANEOUS AT BEDTIME
Refills: 0 | Status: DISCONTINUED | OUTPATIENT
Start: 2022-06-12 | End: 2022-06-15

## 2022-06-12 RX ORDER — ENOXAPARIN SODIUM 100 MG/ML
40 INJECTION SUBCUTANEOUS EVERY 24 HOURS
Refills: 0 | Status: DISCONTINUED | OUTPATIENT
Start: 2022-06-12 | End: 2022-06-15

## 2022-06-12 RX ORDER — LANOLIN ALCOHOL/MO/W.PET/CERES
3 CREAM (GRAM) TOPICAL AT BEDTIME
Refills: 0 | Status: DISCONTINUED | OUTPATIENT
Start: 2022-06-12 | End: 2022-06-15

## 2022-06-12 RX ORDER — SODIUM CHLORIDE 9 MG/ML
1000 INJECTION INTRAMUSCULAR; INTRAVENOUS; SUBCUTANEOUS ONCE
Refills: 0 | Status: COMPLETED | OUTPATIENT
Start: 2022-06-12 | End: 2022-06-12

## 2022-06-12 RX ORDER — MECLIZINE HCL 12.5 MG
1 TABLET ORAL
Qty: 9 | Refills: 0
Start: 2022-06-12 | End: 2022-06-14

## 2022-06-12 RX ORDER — SODIUM CHLORIDE 9 MG/ML
1000 INJECTION INTRAMUSCULAR; INTRAVENOUS; SUBCUTANEOUS
Refills: 0 | Status: DISCONTINUED | OUTPATIENT
Start: 2022-06-12 | End: 2022-06-15

## 2022-06-12 RX ORDER — GLUCAGON INJECTION, SOLUTION 0.5 MG/.1ML
1 INJECTION, SOLUTION SUBCUTANEOUS ONCE
Refills: 0 | Status: DISCONTINUED | OUTPATIENT
Start: 2022-06-12 | End: 2022-06-15

## 2022-06-12 RX ORDER — DEXTROSE 50 % IN WATER 50 %
12.5 SYRINGE (ML) INTRAVENOUS ONCE
Refills: 0 | Status: DISCONTINUED | OUTPATIENT
Start: 2022-06-12 | End: 2022-06-15

## 2022-06-12 RX ORDER — MECLIZINE HCL 12.5 MG
50 TABLET ORAL ONCE
Refills: 0 | Status: COMPLETED | OUTPATIENT
Start: 2022-06-12 | End: 2022-06-12

## 2022-06-12 RX ORDER — MECLIZINE HCL 12.5 MG
50 TABLET ORAL
Refills: 0 | Status: DISCONTINUED | OUTPATIENT
Start: 2022-06-12 | End: 2022-06-15

## 2022-06-12 RX ADMIN — ONDANSETRON 4 MILLIGRAM(S): 8 TABLET, FILM COATED ORAL at 17:53

## 2022-06-12 RX ADMIN — PANTOPRAZOLE SODIUM 40 MILLIGRAM(S): 20 TABLET, DELAYED RELEASE ORAL at 08:54

## 2022-06-12 RX ADMIN — Medication 50 MILLIGRAM(S): at 08:00

## 2022-06-12 RX ADMIN — Medication 1 MILLIGRAM(S): at 08:54

## 2022-06-12 RX ADMIN — ONDANSETRON 4 MILLIGRAM(S): 8 TABLET, FILM COATED ORAL at 12:56

## 2022-06-12 RX ADMIN — VALSARTAN 160 MILLIGRAM(S): 80 TABLET ORAL at 18:47

## 2022-06-12 RX ADMIN — Medication 50 MILLIGRAM(S): at 15:16

## 2022-06-12 RX ADMIN — ATORVASTATIN CALCIUM 80 MILLIGRAM(S): 80 TABLET, FILM COATED ORAL at 21:39

## 2022-06-12 RX ADMIN — Medication 50 MILLIGRAM(S): at 17:53

## 2022-06-12 RX ADMIN — SODIUM CHLORIDE 1000 MILLILITER(S): 9 INJECTION INTRAMUSCULAR; INTRAVENOUS; SUBCUTANEOUS at 08:03

## 2022-06-12 RX ADMIN — SODIUM CHLORIDE 1000 MILLILITER(S): 9 INJECTION INTRAMUSCULAR; INTRAVENOUS; SUBCUTANEOUS at 09:03

## 2022-06-12 RX ADMIN — SODIUM CHLORIDE 70 MILLILITER(S): 9 INJECTION INTRAMUSCULAR; INTRAVENOUS; SUBCUTANEOUS at 21:41

## 2022-06-12 RX ADMIN — Medication 10 MILLIGRAM(S): at 08:01

## 2022-06-12 RX ADMIN — INSULIN GLARGINE 30 UNIT(S): 100 INJECTION, SOLUTION SUBCUTANEOUS at 21:40

## 2022-06-12 RX ADMIN — ENOXAPARIN SODIUM 40 MILLIGRAM(S): 100 INJECTION SUBCUTANEOUS at 17:52

## 2022-06-12 NOTE — H&P ADULT - NSHPPHYSICALEXAM_GEN_ALL_CORE
PHYSICAL EXAM:  GENERAL: NAD, well-groomed, well-developed  HEAD:  Atraumatic, Normocephalic  EYES:  PERRLA, conjunctiva and sclera clear  ENMT: Intact  NECK: Supple,  NERVOUS SYSTEM:  Alert & Oriented X3; Motor Strength 4/5 B/L ; Unsteady gait  CHEST/LUNG: Clear bilaterally; No rales, rhonchi, wheezing, or rubs  HEART: Regular rate and rhythm; No murmurs, rubs, or gallops  ABDOMEN: Soft, Nontender, Nondistended; Bowel sounds present  EXTREMITIES:  No clubbing, cyanosis, or edema  SKIN: No rashes or lesions

## 2022-06-12 NOTE — ED PROVIDER NOTE - CONSTITUTIONAL, MLM
normal... Well appearing, awake, alert, oriented to person, place, time/situation and in no apparent distress. Speaking in clear full sentences no nasal flaring no shoulders retractions no diaphoresis not holding her head/chest/abdomen

## 2022-06-12 NOTE — ED PROVIDER NOTE - OBJECTIVE STATEMENT
77 years old female by ems c/o sudden onset of room spinning sensation increases with head movement nausea vomiting since last night. Pt sts she has a hx of vertigo and it feels same like in the past. Pt denies headache, recent hx of trauma, blurred visions, light sensitivities, focal/distal weakness or numbness, neck/back/hips/calfs pain, cough, sob, chest pain, fever, chills, abd pain, dysuria, vaginal spotting or discharge or irregular bowel movements.

## 2022-06-12 NOTE — ED PROVIDER NOTE - PATIENT PORTAL LINK FT
You can access the FollowMyHealth Patient Portal offered by [facility] by registering at the following website: [email or web address]. By joining [employer]’s FollowMyHealth portal, you will also be able to view your health information using other applications (apps) compatible with our system.

## 2022-06-12 NOTE — ED ADULT NURSE NOTE - OBJECTIVE STATEMENT
Pt bibems c/o sudden onset dizziness/room spinning and n/v since last night. Pt states she feels the same as she did during a previous episode of vertigo. Pt denies cp, sob, diarrhea, fever/chills. ha, blurry vision, palpitations, numbness/tingling, weakness, cough, congestion. Pt bibems c/o sudden onset dizziness/room spinning and n/v since last night. Pt states she feels the same as she did during a previous episode of vertigo. Pt denies cp, sob, diarrhea, fever/chills. ha, blurry vision, palpitations, numbness/tingling, weakness, cough, congestion. Neuro intact, +perrl. 12 lead ekg completed. Stat ct head completed. Cardiac and spo2 monitoring in place.

## 2022-06-12 NOTE — PATIENT PROFILE ADULT - FALL HARM RISK - HARM RISK INTERVENTIONS

## 2022-06-12 NOTE — ED PROVIDER NOTE - PROGRESS NOTE DETAILS
Pt is alert and oriented x 3 sitting up ate and tolerated breakfast Pt is ambulating with normal gaits now. pt is given and explained all test reports and advised to follow up with Dr. Atwood neurologist and return if symptoms persist or worsen Pt sts she is feeling better now. no focal neuro deficits. now pt was trying to walk to bathroom and sts she has spinning sensation again and unable to keep balance and she lives alone. Dr. Javier is notified and admit pt.

## 2022-06-12 NOTE — ED ADULT NURSE REASSESSMENT NOTE - NS ED NURSE REASSESS COMMENT FT1
Pt re-evaluated voiced feeling dizziness and feels the same, MD Montoya made aware, pt to be admitted for continuity of care.

## 2022-06-12 NOTE — H&P ADULT - HISTORY OF PRESENT ILLNESS
78yo, BF with h/o HTN, DM2,  by ems c/o sudden onset of room spinning sensation increases with head movement nausea vomiting since last night.  Patient is unable to ambulate.    States  she has a hx of vertigo and it feels same like in the past. Pt denies headache, recent hx of trauma, blurred visions, light sensitivities, focal/distal weakness or numbness, neck/back/hips/calfs pain, cough, sob, chest pain, fever, chills, abd pain, dysuria, vaginal spotting or discharge or irregular bowel

## 2022-06-12 NOTE — ED ADULT NURSE NOTE - PRO INTERPRETER NEED 2
Medication denied patient has requested medication to soon.  Malinda Bermudez RN  Should have 1 refill left   English

## 2022-06-12 NOTE — ED PROVIDER NOTE - CLINICAL SUMMARY MEDICAL DECISION MAKING FREE TEXT BOX
hx, exam, labs, ekg, ct of head, reeval, neuro referral. hx, exam, labs, ekg, ct of head, reeval, neuro referral., Reeval Pt has another episode of room spinning again unable to keep balance.

## 2022-06-12 NOTE — H&P ADULT - NSHPLABSRESULTS_GEN_ALL_CORE
LABS:                        11.0   4.52  )-----------( 223      ( 2022 08:13 )             34.5     06-12    139  |  108  |  10  ----------------------------<  182<H>  3.9   |  21<L>  |  0.84    Ca    8.7      2022 08:13    TPro  7.6  /  Alb  3.4  /  TBili  0.6  /  DBili  x   /  AST  33  /  ALT  23  /  AlkPhos  124<H>  06-12    PT/INR - ( 2022 08:13 )   PT: 12.5 sec;   INR: 1.05 ratio         PTT - ( 2022 08:13 )  PTT:30.4 sec  Urinalysis Basic - ( 2022 13:20 )    Color: Yellow / Appearance: Clear / S.005 / pH: x  Gluc: x / Ketone: Negative  / Bili: Negative / Urobili: Negative mg/dL   Blood: x / Protein: 15 mg/dL / Nitrite: Negative   Leuk Esterase: Small / RBC: 0-2 /HPF / WBC 3-5   Sq Epi: x / Non Sq Epi: Occasional / Bacteria: Few      CAPILLARY BLOOD GLUCOSE      POCT Blood Glucose.: 125 mg/dL (2022 15:36)  POCT Blood Glucose.: 178 mg/dL (2022 07:42)        < from: CT Head No Cont (22 @ 08:25) >    FINDINGS:    No hydrocephalus, mass effect, midline shift, acute intracranial   hemorrhage, or brain edema.    Similar-appearing right frontal bone defect may represent a desirae hole,   hemangioma, or large arachnoid granulation.    Visualized paranasal sinuses and mastoid air cells are clear.    IMPRESSION:    No hydrocephalus, acute intracranial hemorrhage, mass effect, or brain   edema.      < end of copied text >

## 2022-06-13 LAB
A1C WITH ESTIMATED AVERAGE GLUCOSE RESULT: 8.9 % — HIGH (ref 4–5.6)
ALBUMIN SERPL ELPH-MCNC: 3 G/DL — LOW (ref 3.3–5)
ALP SERPL-CCNC: 111 U/L — SIGNIFICANT CHANGE UP (ref 40–120)
ALT FLD-CCNC: 20 U/L — SIGNIFICANT CHANGE UP (ref 12–78)
ANION GAP SERPL CALC-SCNC: 7 MMOL/L — SIGNIFICANT CHANGE UP (ref 5–17)
AST SERPL-CCNC: 20 U/L — SIGNIFICANT CHANGE UP (ref 15–37)
BILIRUB SERPL-MCNC: 0.6 MG/DL — SIGNIFICANT CHANGE UP (ref 0.2–1.2)
BUN SERPL-MCNC: 7 MG/DL — SIGNIFICANT CHANGE UP (ref 7–23)
CALCIUM SERPL-MCNC: 8.7 MG/DL — SIGNIFICANT CHANGE UP (ref 8.5–10.1)
CHLORIDE SERPL-SCNC: 109 MMOL/L — HIGH (ref 96–108)
CO2 SERPL-SCNC: 25 MMOL/L — SIGNIFICANT CHANGE UP (ref 22–31)
CREAT SERPL-MCNC: 0.77 MG/DL — SIGNIFICANT CHANGE UP (ref 0.5–1.3)
EGFR: 80 ML/MIN/1.73M2 — SIGNIFICANT CHANGE UP
ESTIMATED AVERAGE GLUCOSE: 209 MG/DL — HIGH (ref 68–114)
GLUCOSE BLDC GLUCOMTR-MCNC: 116 MG/DL — HIGH (ref 70–99)
GLUCOSE BLDC GLUCOMTR-MCNC: 179 MG/DL — HIGH (ref 70–99)
GLUCOSE BLDC GLUCOMTR-MCNC: 191 MG/DL — HIGH (ref 70–99)
GLUCOSE BLDC GLUCOMTR-MCNC: 89 MG/DL — SIGNIFICANT CHANGE UP (ref 70–99)
GLUCOSE SERPL-MCNC: 86 MG/DL — SIGNIFICANT CHANGE UP (ref 70–99)
HCT VFR BLD CALC: 33.6 % — LOW (ref 34.5–45)
HCV AB S/CO SERPL IA: 0.11 S/CO — SIGNIFICANT CHANGE UP (ref 0–0.99)
HCV AB SERPL-IMP: SIGNIFICANT CHANGE UP
HGB BLD-MCNC: 10.5 G/DL — LOW (ref 11.5–15.5)
MCHC RBC-ENTMCNC: 22.9 PG — LOW (ref 27–34)
MCHC RBC-ENTMCNC: 31.3 G/DL — LOW (ref 32–36)
MCV RBC AUTO: 73.4 FL — LOW (ref 80–100)
NRBC # BLD: 0 /100 WBCS — SIGNIFICANT CHANGE UP (ref 0–0)
PLATELET # BLD AUTO: 219 K/UL — SIGNIFICANT CHANGE UP (ref 150–400)
POTASSIUM SERPL-MCNC: 3 MMOL/L — LOW (ref 3.5–5.3)
POTASSIUM SERPL-SCNC: 3 MMOL/L — LOW (ref 3.5–5.3)
PROT SERPL-MCNC: 6.8 GM/DL — SIGNIFICANT CHANGE UP (ref 6–8.3)
RBC # BLD: 4.58 M/UL — SIGNIFICANT CHANGE UP (ref 3.8–5.2)
RBC # FLD: 15.3 % — HIGH (ref 10.3–14.5)
SODIUM SERPL-SCNC: 141 MMOL/L — SIGNIFICANT CHANGE UP (ref 135–145)
WBC # BLD: 2.88 K/UL — LOW (ref 3.8–10.5)
WBC # FLD AUTO: 2.88 K/UL — LOW (ref 3.8–10.5)

## 2022-06-13 RX ORDER — POTASSIUM CHLORIDE 20 MEQ
40 PACKET (EA) ORAL ONCE
Refills: 0 | Status: COMPLETED | OUTPATIENT
Start: 2022-06-13 | End: 2022-06-13

## 2022-06-13 RX ORDER — ATORVASTATIN CALCIUM 80 MG/1
40 TABLET, FILM COATED ORAL AT BEDTIME
Refills: 0 | Status: DISCONTINUED | OUTPATIENT
Start: 2022-06-13 | End: 2022-06-15

## 2022-06-13 RX ORDER — ASPIRIN/CALCIUM CARB/MAGNESIUM 324 MG
81 TABLET ORAL DAILY
Refills: 0 | Status: DISCONTINUED | OUTPATIENT
Start: 2022-06-13 | End: 2022-06-15

## 2022-06-13 RX ADMIN — ATORVASTATIN CALCIUM 40 MILLIGRAM(S): 80 TABLET, FILM COATED ORAL at 21:29

## 2022-06-13 RX ADMIN — ONDANSETRON 4 MILLIGRAM(S): 8 TABLET, FILM COATED ORAL at 05:41

## 2022-06-13 RX ADMIN — ONDANSETRON 4 MILLIGRAM(S): 8 TABLET, FILM COATED ORAL at 11:48

## 2022-06-13 RX ADMIN — Medication 50 MILLIGRAM(S): at 05:42

## 2022-06-13 RX ADMIN — Medication 40 MILLIEQUIVALENT(S): at 18:27

## 2022-06-13 RX ADMIN — ONDANSETRON 4 MILLIGRAM(S): 8 TABLET, FILM COATED ORAL at 18:26

## 2022-06-13 RX ADMIN — Medication 2: at 11:48

## 2022-06-13 RX ADMIN — VALSARTAN 160 MILLIGRAM(S): 80 TABLET ORAL at 05:42

## 2022-06-13 RX ADMIN — Medication 50 MILLIGRAM(S): at 18:27

## 2022-06-13 RX ADMIN — ONDANSETRON 4 MILLIGRAM(S): 8 TABLET, FILM COATED ORAL at 23:21

## 2022-06-13 RX ADMIN — PANTOPRAZOLE SODIUM 40 MILLIGRAM(S): 20 TABLET, DELAYED RELEASE ORAL at 05:43

## 2022-06-13 RX ADMIN — INSULIN GLARGINE 30 UNIT(S): 100 INJECTION, SOLUTION SUBCUTANEOUS at 21:30

## 2022-06-13 RX ADMIN — ENOXAPARIN SODIUM 40 MILLIGRAM(S): 100 INJECTION SUBCUTANEOUS at 17:12

## 2022-06-13 NOTE — PHYSICAL THERAPY INITIAL EVALUATION ADULT - ACTIVE RANGE OF MOTION EXAMINATION, REHAB EVAL
gris. upper extremity Active ROM was WNL (within normal limits)/bilateral lower extremity Active ROM was WNL (within normal limits)

## 2022-06-13 NOTE — PHYSICAL THERAPY INITIAL EVALUATION ADULT - ADDITIONAL COMMENTS
Pt endorses that she lives in a pvt house with  4 steps to enter with R hand rail. Pt uses a SC as needed.

## 2022-06-13 NOTE — PHYSICAL THERAPY INITIAL EVALUATION ADULT - TRANSFER TRAINING, PT EVAL
Pt will independently perform sit to/from stand transfers without LOB using appropiate AD by 1-2 weeks

## 2022-06-13 NOTE — INPATIENT CERTIFICATION FOR MEDICARE PATIENTS - THE STATUS OF COMORBIDITIES.
Wound Care Progress Note    Chief Complaint   Chief Complaint   Patient presents with   • Office Visit     [facility]   • Wound                                                         Wound Assessments  Pain  Pain Assessment Tool: Numeric Rating Scale 0-10  Pain Intensity  Numeric Rating Scale 0-10: 0  Pain Goal  Patient's Stated Pain Goal: No pain   Wound Ankle Right Lateral (Active)   Date First Assessed: 06/22/21   Location: Ankle  Laterality: Right  Modifier: Lateral      Assessments 8/9/2021  8:30 AM   Wound Image     Dressing Assessment Intact;Drainage present   Dressing Activity Changed   Dressing Changed On   08/09/21   Wound Exudate None   Cleansing Agent Normal saline   Wound Bed/Tissue Type Granulated (Tendon)   Periwound Condition Normal   Wound Edge Attached to wound bed;Well defined   Wound Status Improving   Wound Last Measured 08/02/21   Wound Length (cm) 0 cm   Wound Width (cm) 0 cm   Wound Depth (cm) 0 cm   Wound Surface Area (cm^2) 0 cm^2   Wound Volume (cm^3) 0 cm^3   PhotoTaken? Yes   Wound Bed % Epithelialized 100 %   Wound Volume Change (Initial) -0.6 cm3   Wound Volume % Change (Initial) -100 %   Wound Volume Change (30 days) -1.58 cm3   Wound Volume % Change (30 days) -100 %      RLE - Right Lower Extremity  Circumference - Calf (cm): 34.5 cm (@26cm)  Circumference - Ankle (cm): 23.2 cm  Circumference - Foot (cm): 23.7 cm  Treatment  Wound Location: R lateral ankle  Treatment Type: Compression, Debridement, Ultrasound  Compression  Limb Cleansed: Soap and water  Compression Applied to: Other (comment) (toe bases to tibial tuberosity)  Compression Used: Tubular compression bandage size C (double layer)  Debridement  Photo Taken: Yes  Wound Cleansing : 0.9% Saline  Primary Wound Dressing: Other (comment) (Bandaid; mineral oil to callused skin)  Debridement Type: Non-selective     ASSESSMENT  Wound fully epithelialized with slight callus buildup. Pt educated re: moisturizing and importance of  consistent compression using 30-40mmHg garment which pt has at home. Pt did not bring garment to [facility] appt; DL Tubigrip C applied until pt gets home and will apply garment. Pt further educated to cont protection to fragile epithelium at wound site x1wk using bandaid.    Garfield=14 (previously 35)    Plan  Pt will be tentatively disch'd from [facility]. Pt to f//u with MD Galvez 08/17 for final d/c.    Naren Lozano, PT   8/9/2021   2. The status of comorbities. (See ED/admit documents)

## 2022-06-13 NOTE — CONSULT NOTE ADULT - SUBJECTIVE AND OBJECTIVE BOX
Neurology consult    JEFFERY [id number]     Patient is a 77y old  Female who presents with a chief complaint of Dizziness (2022 16:47)      HPI:  76yo, BF with h/o HTN, DM2,  by ems c/o sudden onset of room spinning sensation increases with head movement nausea vomiting since last night.  Patient is unable to ambulate.    States  she has a hx of vertigo and it feels same like in the past. Pt denies headache, recent hx of trauma, blurred visions, light sensitivities, focal/distal weakness or numbness, neck/back/hips/calfs pain, cough, sob, chest pain, fever, chills, abd pain, dysuria, vaginal spotting or discharge or irregular bowel  (2022 16:47)      endorses LLE weakness    MEDICATIONS    acetaminophen     Tablet .. 650 milliGRAM(s) Oral every 6 hours PRN  aluminum hydroxide/magnesium hydroxide/simethicone Suspension 30 milliLiter(s) Oral every 4 hours PRN  atorvastatin 80 milliGRAM(s) Oral at bedtime  dextrose 5%. 1000 milliLiter(s) IV Continuous <Continuous>  dextrose 50% Injectable 25 Gram(s) IV Push once  dextrose 50% Injectable 12.5 Gram(s) IV Push once  dextrose 50% Injectable 25 Gram(s) IV Push once  dextrose Oral Gel 15 Gram(s) Oral once PRN  enoxaparin Injectable 40 milliGRAM(s) SubCutaneous every 24 hours  glucagon  Injectable 1 milliGRAM(s) IntraMuscular once  influenza  Vaccine (HIGH DOSE) 0.7 milliLiter(s) IntraMuscular once  insulin glargine Injectable (LANTUS) 30 Unit(s) SubCutaneous at bedtime  insulin lispro (ADMELOG) corrective regimen sliding scale   SubCutaneous three times a day before meals  meclizine 50 milliGRAM(s) Oral two times a day  melatonin 3 milliGRAM(s) Oral at bedtime PRN  metoprolol succinate ER 50 milliGRAM(s) Oral daily  ondansetron Injectable 4 milliGRAM(s) IV Push every 6 hours  pantoprazole    Tablet 40 milliGRAM(s) Oral before breakfast  sodium chloride 0.9%. 1000 milliLiter(s) IV Continuous <Continuous>  valsartan 160 milliGRAM(s) Oral daily      PMH: DM2 (diabetes mellitus, type 2)    H/O: HTN (hypertension)    Vertigo         PSH: Radial fracture        Family history: No history of dementia, strokes, or seizures   FAMILY HISTORY:      SOCIAL HISTORY:  No history of tobacco or alcohol use     Allergies    No Known Allergies    Intolerances          Weight (kg): 75.3 (-12 @ 21:11)    Vital Signs Last 24 Hrs  T(C): 36.4 (2022 04:39), Max: 37.2 (2022 19:12)  T(F): 97.6 (2022 04:39), Max: 98.9 (2022 19:12)  HR: 62 (2022 04:39) (61 - 86)  BP: 147/76 (2022 04:39) (147/76 - 186/96)  BP(mean): --  RR: 18 (2022 04:39) (12 - 18)  SpO2: 99% (2022 04:39) (95% - 99%)      On Neurological Examination:    Mental Status - Patient is alert, awake, oriented X3. fluent, names, no dysarthria no aphasia Follows commands well and able to answer questions appropriately. Mood and affect  normal    Cranial Nerves - PERRL, EOMI, VFF, V1-V3 intact, no gross facial asymmetry, tongue/uvula midline    Motor Exam -   no drift     nml bulk/tone    Sensory    Intact to light touch and pinprick bilaterally    Coord: very subtle LUE dysmetrai    Gait -  ubsteady           LABS:  CBC Full  -  ( 2022 08:45 )  WBC Count : 2.88 K/uL  RBC Count : 4.58 M/uL  Hemoglobin : 10.5 g/dL  Hematocrit : 33.6 %  Platelet Count - Automated : 219 K/uL  Mean Cell Volume : 73.4 fl  Mean Cell Hemoglobin : 22.9 pg  Mean Cell Hemoglobin Concentration : 31.3 g/dL  Auto Neutrophil # : x  Auto Lymphocyte # : x  Auto Monocyte # : x  Auto Eosinophil # : x  Auto Basophil # : x  Auto Neutrophil % : x  Auto Lymphocyte % : x  Auto Monocyte % : x  Auto Eosinophil % : x  Auto Basophil % : x    Urinalysis Basic - ( 2022 13:20 )    Color: Yellow / Appearance: Clear / S.005 / pH: x  Gluc: x / Ketone: Negative  / Bili: Negative / Urobili: Negative mg/dL   Blood: x / Protein: 15 mg/dL / Nitrite: Negative   Leuk Esterase: Small / RBC: 0-2 /HPF / WBC 3-5   Sq Epi: x / Non Sq Epi: Occasional / Bacteria: Few          141  |  109<H>  |  7   ----------------------------<  86  3.0<L>   |  25  |  0.77    Ca    8.7      2022 08:45    TPro  6.8  /  Alb  3.0<L>  /  TBili  0.6  /  DBili  x   /  AST  20  /  ALT  20  /  AlkPhos  111  -    LIVER FUNCTIONS - ( 2022 08:45 )  Alb: 3.0 g/dL / Pro: 6.8 gm/dL / ALK PHOS: 111 U/L / ALT: 20 U/L / AST: 20 U/L / GGT: x           Hemoglobin A1C:       PT/INR - ( 2022 08:13 )   PT: 12.5 sec;   INR: 1.05 ratio         PTT - ( 2022 08:13 )  PTT:30.4 sec      RADIOLOGY  [facility]   MRI:

## 2022-06-13 NOTE — PHYSICAL THERAPY INITIAL EVALUATION ADULT - PERTINENT HX OF CURRENT PROBLEM, REHAB EVAL
Pt is a 78 yo F with hx of vertigo, DM, htn, brought in for vertigo type symptoms. Scan shows brain edema

## 2022-06-13 NOTE — CONSULT NOTE ADULT - ASSESSMENT
78yo, BF with h/o HTN, DM2,  by ems c/o sudden onset of room spinning sensation improving, still gait dififculty, subjective LLe weakness. very subtle LUE dysmetria CTH- NIHSS 1    Impression: vertigo peripheral vs r/o CVA. Given risk factors subjective LLe weakness, questionable LUE dysmetria and sudden inset would consider a stroke w/u     MRI brain without contrast   consider MRA H/N to look at the cerebral vasculature.    Aspirin for now   On statin  If MRI + would obtain a TTE and tele  DVT prophylaxis, Neurochecks  HbA1C and LDL.   PT/OT/Speech and swallow/safety eval.

## 2022-06-13 NOTE — PHYSICAL THERAPY INITIAL EVALUATION ADULT - GAIT TRAINING, PT EVAL
Notes faxed. Pt aware.   Pt will independently ambulate 150 feet with appropiate AD without loss of balance, by 2 weeks

## 2022-06-14 ENCOUNTER — TRANSCRIPTION ENCOUNTER (OUTPATIENT)
Age: 77
End: 2022-06-14

## 2022-06-14 LAB
ALBUMIN SERPL ELPH-MCNC: 2.9 G/DL — LOW (ref 3.3–5)
ALP SERPL-CCNC: 111 U/L — SIGNIFICANT CHANGE UP (ref 40–120)
ALT FLD-CCNC: 23 U/L — SIGNIFICANT CHANGE UP (ref 12–78)
ANION GAP SERPL CALC-SCNC: 6 MMOL/L — SIGNIFICANT CHANGE UP (ref 5–17)
AST SERPL-CCNC: 21 U/L — SIGNIFICANT CHANGE UP (ref 15–37)
BILIRUB SERPL-MCNC: 0.4 MG/DL — SIGNIFICANT CHANGE UP (ref 0.2–1.2)
BUN SERPL-MCNC: 12 MG/DL — SIGNIFICANT CHANGE UP (ref 7–23)
CALCIUM SERPL-MCNC: 8.7 MG/DL — SIGNIFICANT CHANGE UP (ref 8.5–10.1)
CHLORIDE SERPL-SCNC: 111 MMOL/L — HIGH (ref 96–108)
CHOLEST SERPL-MCNC: 139 MG/DL — SIGNIFICANT CHANGE UP
CO2 SERPL-SCNC: 25 MMOL/L — SIGNIFICANT CHANGE UP (ref 22–31)
CREAT SERPL-MCNC: 0.85 MG/DL — SIGNIFICANT CHANGE UP (ref 0.5–1.3)
EGFR: 71 ML/MIN/1.73M2 — SIGNIFICANT CHANGE UP
GLUCOSE BLDC GLUCOMTR-MCNC: 102 MG/DL — HIGH (ref 70–99)
GLUCOSE BLDC GLUCOMTR-MCNC: 168 MG/DL — HIGH (ref 70–99)
GLUCOSE BLDC GLUCOMTR-MCNC: 184 MG/DL — HIGH (ref 70–99)
GLUCOSE BLDC GLUCOMTR-MCNC: 202 MG/DL — HIGH (ref 70–99)
GLUCOSE SERPL-MCNC: 123 MG/DL — HIGH (ref 70–99)
HCT VFR BLD CALC: 34.7 % — SIGNIFICANT CHANGE UP (ref 34.5–45)
HDLC SERPL-MCNC: 40 MG/DL — LOW
HGB BLD-MCNC: 10.7 G/DL — LOW (ref 11.5–15.5)
LIPID PNL WITH DIRECT LDL SERPL: 86 MG/DL — SIGNIFICANT CHANGE UP
MCHC RBC-ENTMCNC: 22.7 PG — LOW (ref 27–34)
MCHC RBC-ENTMCNC: 30.8 G/DL — LOW (ref 32–36)
MCV RBC AUTO: 73.5 FL — LOW (ref 80–100)
NON HDL CHOLESTEROL: 99 MG/DL — SIGNIFICANT CHANGE UP
NRBC # BLD: 0 /100 WBCS — SIGNIFICANT CHANGE UP (ref 0–0)
PLATELET # BLD AUTO: 233 K/UL — SIGNIFICANT CHANGE UP (ref 150–400)
POTASSIUM SERPL-MCNC: 3.8 MMOL/L — SIGNIFICANT CHANGE UP (ref 3.5–5.3)
POTASSIUM SERPL-SCNC: 3.8 MMOL/L — SIGNIFICANT CHANGE UP (ref 3.5–5.3)
PROT SERPL-MCNC: 6.7 GM/DL — SIGNIFICANT CHANGE UP (ref 6–8.3)
RBC # BLD: 4.72 M/UL — SIGNIFICANT CHANGE UP (ref 3.8–5.2)
RBC # FLD: 15.3 % — HIGH (ref 10.3–14.5)
SODIUM SERPL-SCNC: 142 MMOL/L — SIGNIFICANT CHANGE UP (ref 135–145)
TRIGL SERPL-MCNC: 66 MG/DL — SIGNIFICANT CHANGE UP
WBC # BLD: 3.37 K/UL — LOW (ref 3.8–10.5)
WBC # FLD AUTO: 3.37 K/UL — LOW (ref 3.8–10.5)

## 2022-06-14 PROCEDURE — 70551 MRI BRAIN STEM W/O DYE: CPT | Mod: 26

## 2022-06-14 RX ADMIN — Medication 50 MILLIGRAM(S): at 17:06

## 2022-06-14 RX ADMIN — SODIUM CHLORIDE 70 MILLILITER(S): 9 INJECTION INTRAMUSCULAR; INTRAVENOUS; SUBCUTANEOUS at 05:32

## 2022-06-14 RX ADMIN — SODIUM CHLORIDE 70 MILLILITER(S): 9 INJECTION INTRAMUSCULAR; INTRAVENOUS; SUBCUTANEOUS at 11:31

## 2022-06-14 RX ADMIN — Medication 50 MILLIGRAM(S): at 05:31

## 2022-06-14 RX ADMIN — Medication 50 MILLIGRAM(S): at 05:32

## 2022-06-14 RX ADMIN — ONDANSETRON 4 MILLIGRAM(S): 8 TABLET, FILM COATED ORAL at 11:34

## 2022-06-14 RX ADMIN — VALSARTAN 160 MILLIGRAM(S): 80 TABLET ORAL at 05:32

## 2022-06-14 RX ADMIN — Medication 2: at 11:35

## 2022-06-14 RX ADMIN — Medication 2: at 17:04

## 2022-06-14 RX ADMIN — ATORVASTATIN CALCIUM 40 MILLIGRAM(S): 80 TABLET, FILM COATED ORAL at 21:09

## 2022-06-14 RX ADMIN — INSULIN GLARGINE 30 UNIT(S): 100 INJECTION, SOLUTION SUBCUTANEOUS at 21:10

## 2022-06-14 RX ADMIN — ENOXAPARIN SODIUM 40 MILLIGRAM(S): 100 INJECTION SUBCUTANEOUS at 17:06

## 2022-06-14 RX ADMIN — ONDANSETRON 4 MILLIGRAM(S): 8 TABLET, FILM COATED ORAL at 05:31

## 2022-06-14 RX ADMIN — PANTOPRAZOLE SODIUM 40 MILLIGRAM(S): 20 TABLET, DELAYED RELEASE ORAL at 05:31

## 2022-06-14 RX ADMIN — ONDANSETRON 4 MILLIGRAM(S): 8 TABLET, FILM COATED ORAL at 18:04

## 2022-06-14 RX ADMIN — Medication 81 MILLIGRAM(S): at 11:34

## 2022-06-14 NOTE — PHARMACOTHERAPY INTERVENTION NOTE - COMMENTS
Recommended to obtain a lipid panel as per neurology's note since the pt is being worked up for a stroke. 
Pharmacy: [facility]   Source: Pharmacist     Metoprolol succinate 50 mg, one tab QD   Last Filled: 05/30, picked up 90 days     Metformin 1000 mg, one tab QD  Last Filled: 05/30, picked up 90 days     Atorvastatin 40 mg, one tab QD  Last Filled: 03/02, picked up 90 days     Valsartan 160 mg, one tab QD  Last Filled: 03/02, picked up 90 days     Lantus, inject 35 units SQ bedtime  Last Filled: 03/02, picked up 90 days 
Recommended to switch atorvastatin 80 mg QD to atorvastatin 40 mg QD as per patient's home medication.
Recommended to replete potassium since yesterday the potassium came back as 3. 
Recommended to add on aspirin 81 mg QD as per neurology's recommendation as the pt is being worked up for a CVA.

## 2022-06-14 NOTE — PROGRESS NOTE ADULT - ASSESSMENT
76yo, BF with h/o HTN, DM2,  by ems c/o sudden onset of room spinning sensation improving, still gait dififculty, subjective LLe weakness. very subtle LUE dysmetria CTH- NIHSS 1    Impression: vertigo peripheral vs r/o CVA. Given risk factors questionable LUE dysmetria and sudden onset would consider a stroke w/u     MRI brain without contrast   consider MRA H/N to look at the cerebral vasculature.    Aspirin for now until MRI  On statin  If MRI + would obtain a TTE and tele  DVT prophylaxis, Neurochecks  HbA1C and LDL.   PT/OT/Speech and swallow/safety eval.

## 2022-06-14 NOTE — PROGRESS NOTE ADULT - TIME BILLING
PT  MR  Advance diet  Continue current care and treatment.
Neuro following  For MR Head  PT  Continue current care and treatment.

## 2022-06-14 NOTE — DISCHARGE NOTE PROVIDER - CARE PROVIDER_API CALL
Chintan Espinoza)  Neurology; Neurophysiology  [address], [address]  [place] [address]  Phone: [phone]  Fax: [phone]  Follow Up Time: 1 week    PMD,   Phone: (   )    -  Fax: (   )    -  Follow Up Time: 1 week

## 2022-06-14 NOTE — PHARMACOTHERAPY INTERVENTION NOTE - INTERVENTION TYPE RECOOMEND
Therapy Recommended - Drug indicated but not ordered
Therapy Recommended - Drug indicated but not ordered
Therapy Recommended - Med Rec related

## 2022-06-14 NOTE — DISCHARGE NOTE PROVIDER - NSDCMRMEDTOKEN_GEN_ALL_CORE_FT
Lantus 100 units/mL subcutaneous solution: 30 international unit(s) subcutaneous once a day (at bedtime)  meclizine 50 mg oral tablet: 1 tab(s) orally 3 times a day   metFORMIN 1000 mg oral tablet, extended release: 1 tab(s) orally once a day  metoprolol succinate 50 mg oral tablet, extended release: 1 tab(s) orally once a day  rosuvastatin 20 mg oral tablet: 1 tab(s) orally once a day (at bedtime)  valsartan 160 mg oral tablet: 1 tab(s) orally once a day   aspirin 81 mg oral tablet, chewable: 1 tab(s) orally once a day  Lantus 100 units/mL subcutaneous solution: 30 international unit(s) subcutaneous once a day (at bedtime)  meclizine 50 mg oral tablet: 1 tab(s) orally 3 times a day   metFORMIN 1000 mg oral tablet, extended release: 1 tab(s) orally once a day  metoprolol succinate 50 mg oral tablet, extended release: 1 tab(s) orally once a day  rosuvastatin 20 mg oral tablet: 1 tab(s) orally once a day (at bedtime)  valsartan 160 mg oral tablet: 1 tab(s) orally once a day

## 2022-06-14 NOTE — DISCHARGE NOTE PROVIDER - DETAILS OF MALNUTRITION DIAGNOSIS/DIAGNOSES
This patient has been assessed with a concern for Malnutrition and was treated during this hospitalization for the following Nutrition diagnosis/diagnoses:     -  06/15/2022: Moderate protein-calorie malnutrition

## 2022-06-14 NOTE — DISCHARGE NOTE PROVIDER - NSDCCPCAREPLAN_GEN_ALL_CORE_FT
PRINCIPAL DISCHARGE DIAGNOSIS  Diagnosis: Constant vertigo  Assessment and Plan of Treatment: 78yo, BF with h/o HTN, DM2,  by ems c/o sudden onset of room spinning sensation improving, still gait dififculty, subjective LLe weakness. very subtle LUE dysmetria CTH- NIHSS 1  Patient with vertigo peripheral vs r/o CVA. Seen by neurologist to rule out stroke.  MRI head negative. Continue aspirin and statin.  Needs to follow up with PMD and neurologist 1 week.

## 2022-06-14 NOTE — PROGRESS NOTE ADULT - PROBLEM SELECTOR PLAN 1
IVF  IV Zofran   Meclizine  Neuro consult noted; Impression: vertigo peripheral vs r/o CVA. Given risk factors subjective LLe weakness, questionable LUE dysmetria and sudden inset would consider a stroke w/u     MRI brain without contrast   consider MRA H/N to look at the cerebral vasculature.    Aspirin for now   On statin  If MRI + would obtain a TTE and tele  DVT prophylaxis, Neurochecks  HbA1C and LDL.   PT/OT/Speech and swallow/safety eval.
IVF  IV Zofran   Meclizine  Neuro consult noted; Impression: vertigo peripheral vs r/o CVA. Given risk factors subjective LLe weakness, questionable LUE dysmetria and sudden inset would consider a stroke w/u     MRI brain without contrast   consider MRA H/N to look at the cerebral vasculature.    Aspirin for now   On statin  If MRI + would obtain a TTE and tele  DVT prophylaxis, Neurochecks  HbA1C and LDL.   PT/OT/Speech and swallow/safety eval.

## 2022-06-14 NOTE — DISCHARGE NOTE PROVIDER - PROVIDER TOKENS
PROVIDER:[TOKEN:[[id number]],FOLLOWUP:[1 week]],FREE:[LAST:[PMD],PHONE:[(   )    -],FAX:[(   )    -],FOLLOWUP:[1 week]]

## 2022-06-14 NOTE — DISCHARGE NOTE PROVIDER - HOSPITAL COURSE
76yo, BF with h/o HTN, DM2,  by ems c/o sudden onset of room spinning sensation improving, still gait dififculty, subjective LLe weakness. very subtle LUE dysmetria CTH- NIHSS 1    Patient with vertigo peripheral vs r/o CVA. Seen by neurologist to rule out stroke.  Awaiting MRI head. Continue aspirin and statin. if positive mri needs ECHO.  Needs to follow up with PMD and neurologist 1 week.       76yo, BF with h/o HTN, DM2,  by ems c/o sudden onset of room spinning sensation improving, still gait dififculty, subjective LLe weakness. very subtle LUE dysmetria CTH- NIHSS 1    Patient with vertigo peripheral vs r/o CVA. Seen by neurologist to rule out stroke.  MRI head negative. Continue aspirin and statin.  Needs to follow up with PMD and neurologist 1 week.

## 2022-06-15 ENCOUNTER — TRANSCRIPTION ENCOUNTER (OUTPATIENT)
Age: 77
End: 2022-06-15

## 2022-06-15 VITALS
SYSTOLIC BLOOD PRESSURE: 136 MMHG | HEART RATE: 67 BPM | RESPIRATION RATE: 18 BRPM | DIASTOLIC BLOOD PRESSURE: 77 MMHG | TEMPERATURE: 98 F | OXYGEN SATURATION: 98 %

## 2022-06-15 LAB
GLUCOSE BLDC GLUCOMTR-MCNC: 112 MG/DL — HIGH (ref 70–99)
GLUCOSE BLDC GLUCOMTR-MCNC: 138 MG/DL — HIGH (ref 70–99)
GLUCOSE BLDC GLUCOMTR-MCNC: 234 MG/DL — HIGH (ref 70–99)

## 2022-06-15 RX ORDER — ASPIRIN/CALCIUM CARB/MAGNESIUM 324 MG
1 TABLET ORAL
Qty: 30 | Refills: 0
Start: 2022-06-15 | End: 2022-07-14

## 2022-06-15 RX ORDER — METOPROLOL TARTRATE 50 MG
1 TABLET ORAL
Qty: 0 | Refills: 0 | DISCHARGE
Start: 2022-06-15

## 2022-06-15 RX ORDER — MECLIZINE HCL 12.5 MG
1 TABLET ORAL
Qty: 42 | Refills: 0
Start: 2022-06-15 | End: 2022-06-28

## 2022-06-15 RX ADMIN — SODIUM CHLORIDE 70 MILLILITER(S): 9 INJECTION INTRAMUSCULAR; INTRAVENOUS; SUBCUTANEOUS at 05:43

## 2022-06-15 RX ADMIN — Medication 50 MILLIGRAM(S): at 05:43

## 2022-06-15 RX ADMIN — Medication 81 MILLIGRAM(S): at 11:58

## 2022-06-15 RX ADMIN — Medication 4: at 11:57

## 2022-06-15 RX ADMIN — VALSARTAN 160 MILLIGRAM(S): 80 TABLET ORAL at 05:43

## 2022-06-15 RX ADMIN — ONDANSETRON 4 MILLIGRAM(S): 8 TABLET, FILM COATED ORAL at 11:57

## 2022-06-15 RX ADMIN — Medication 50 MILLIGRAM(S): at 16:48

## 2022-06-15 RX ADMIN — PANTOPRAZOLE SODIUM 40 MILLIGRAM(S): 20 TABLET, DELAYED RELEASE ORAL at 05:43

## 2022-06-15 NOTE — PROGRESS NOTE ADULT - ASSESSMENT
78yo, BF with h/o HTN, DM2,  by ems c/o sudden onset of room spinning sensation improving, still gait dififculty, subjective LLe weakness. very subtle LUE dysmetria CTH- NIHSS 1    Impression:peripheral vertigo      meclizine PRN    Can follow up with Neurology, Dr. Chintan Espinoza at [phone] 78yo, BF with h/o HTN, DM2,  by ems c/o sudden onset of room spinning sensation improving, still gait dififculty, subjective LLe weakness. very subtle LUE dysmetria CTH- NIHSS 1    Impression:peripheral vertigo      meclizine PRN  MRI negative  physical therapy  Can follow up with Neurology, Dr. Chintan Espinoza at [phone]

## 2022-06-15 NOTE — PROGRESS NOTE ADULT - SUBJECTIVE AND OBJECTIVE BOX
Patient seen and examined this am. No new events    MEDICATIONS:    acetaminophen     Tablet .. 650 milliGRAM(s) Oral every 6 hours PRN  aluminum hydroxide/magnesium hydroxide/simethicone Suspension 30 milliLiter(s) Oral every 4 hours PRN  aspirin  chewable 81 milliGRAM(s) Oral daily  atorvastatin 40 milliGRAM(s) Oral at bedtime  dextrose 5%. 1000 milliLiter(s) IV Continuous <Continuous>  dextrose 50% Injectable 25 Gram(s) IV Push once  dextrose 50% Injectable 12.5 Gram(s) IV Push once  dextrose 50% Injectable 25 Gram(s) IV Push once  dextrose Oral Gel 15 Gram(s) Oral once PRN  enoxaparin Injectable 40 milliGRAM(s) SubCutaneous every 24 hours  glucagon  Injectable 1 milliGRAM(s) IntraMuscular once  influenza  Vaccine (HIGH DOSE) 0.7 milliLiter(s) IntraMuscular once  insulin glargine Injectable (LANTUS) 30 Unit(s) SubCutaneous at bedtime  insulin lispro (ADMELOG) corrective regimen sliding scale   SubCutaneous three times a day before meals  meclizine 50 milliGRAM(s) Oral two times a day  melatonin 3 milliGRAM(s) Oral at bedtime PRN  metoprolol succinate ER 50 milliGRAM(s) Oral daily  ondansetron Injectable 4 milliGRAM(s) IV Push every 6 hours  pantoprazole    Tablet 40 milliGRAM(s) Oral before breakfast  sodium chloride 0.9%. 1000 milliLiter(s) IV Continuous <Continuous>  valsartan 160 milliGRAM(s) Oral daily      LABS:                          10.7   3.37  )-----------( 233      ( 14 Jun 2022 07:33 )             34.7     06-14    142  |  111<H>  |  12  ----------------------------<  123<H>  3.8   |  25  |  0.85    Ca    8.7      14 Jun 2022 07:33    TPro  6.7  /  Alb  2.9<L>  /  TBili  0.4  /  DBili  x   /  AST  21  /  ALT  23  /  AlkPhos  111  06-14    CAPILLARY BLOOD GLUCOSE      POCT Blood Glucose.: 234 mg/dL (15 Leobardo 2022 11:41)  POCT Blood Glucose.: 138 mg/dL (15 Leobardo 2022 07:58)  POCT Blood Glucose.: 202 mg/dL (14 Jun 2022 21:07)  POCT Blood Glucose.: 184 mg/dL (14 Jun 2022 16:14)        I&O's Summary    Vital Signs Last 24 Hrs  T(C): 36.2 (15 Leobardo 2022 10:55), Max: 36.9 (14 Jun 2022 23:44)  T(F): 97.2 (15 Leobardo 2022 10:55), Max: 98.5 (14 Jun 2022 23:44)  HR: 63 (15 Leobardo 2022 10:55) (60 - 69)  BP: 99/64 (15 Leobardo 2022 10:55) (99/64 - 151/78)  BP(mean): --  RR: 18 (15 Leobardo 2022 10:55) (16 - 18)  SpO2: 99% (15 Leobardo 2022 10:55) (97% - 100%)      On Neurological Examination:    Mental Status - Patient is alert, awake, oriented X3. fluent, names, no dysarthria no aphasia Follows commands well and able to answer questions appropriately. Mood and affect  normal    Cranial Nerves - PERRL, EOMI, VFF, V1-V3 intact, no gross facial asymmetry, tongue/uvula midline    Motor Exam -   no drift     nml bulk/tone    Sensory    Intact to light touch and pinprick bilaterally    Coord: very subtle LUE dysmetrai    Gait -  ubsteady       < from: CT Head No Cont (06.12.22 @ 08:25) >  IMPRESSION:    No hydrocephalus, acute intracranial hemorrhage, mass effect, or brain   edema.    --- End of Report ---      < end of copied text >        < from: MR Head No Cont (06.14.22 @ 15:31) >  FINDINGS:  There is no abnormal restricted diffusion to suggest acute infarction.   Scattered periventricular and subcortical white matter T2 /FLAIR   hyperintensities are seen without mass effect, nonspecific, likely   representing moderate chronic microvascular changes. Normal T2 flow-voids   are seen within  the intracranial vasculature. The lateral ventricles and   cortical sulci are age-appropriate in size and configuration. There is no   mass, mass effect, or extra-axial fluid collection. There is no   susceptibility artifact to suggest hemorrhage. Midline structures are   normal.  The patient is status post bilateral ocular lens replacement   surgery. The visualized paranasal sinuses, mastoid air cells and orbits   are otherwise unremarkable.      IMPRESSION: No acute infarction.    --- End of Report ---            NATALEE ESTEBAN MD; Attending Radiologist  This document has been electronically signed. Jun 14 2022  3:40PM    < end of copied text >            
Patient seen and examined this am. No new events    MEDICATIONS:    acetaminophen     Tablet .. 650 milliGRAM(s) Oral every 6 hours PRN  aluminum hydroxide/magnesium hydroxide/simethicone Suspension 30 milliLiter(s) Oral every 4 hours PRN  aspirin  chewable 81 milliGRAM(s) Oral daily  atorvastatin 40 milliGRAM(s) Oral at bedtime  dextrose 5%. 1000 milliLiter(s) IV Continuous <Continuous>  dextrose 50% Injectable 25 Gram(s) IV Push once  dextrose 50% Injectable 12.5 Gram(s) IV Push once  dextrose 50% Injectable 25 Gram(s) IV Push once  dextrose Oral Gel 15 Gram(s) Oral once PRN  enoxaparin Injectable 40 milliGRAM(s) SubCutaneous every 24 hours  glucagon  Injectable 1 milliGRAM(s) IntraMuscular once  influenza  Vaccine (HIGH DOSE) 0.7 milliLiter(s) IntraMuscular once  insulin glargine Injectable (LANTUS) 30 Unit(s) SubCutaneous at bedtime  insulin lispro (ADMELOG) corrective regimen sliding scale   SubCutaneous three times a day before meals  meclizine 50 milliGRAM(s) Oral two times a day  melatonin 3 milliGRAM(s) Oral at bedtime PRN  metoprolol succinate ER 50 milliGRAM(s) Oral daily  ondansetron Injectable 4 milliGRAM(s) IV Push every 6 hours  pantoprazole    Tablet 40 milliGRAM(s) Oral before breakfast  sodium chloride 0.9%. 1000 milliLiter(s) IV Continuous <Continuous>  valsartan 160 milliGRAM(s) Oral daily      LABS:                          10.7   3.37  )-----------( 233      ( 2022 07:33 )             34.7     06-14    142  |  111<H>  |  12  ----------------------------<  123<H>  3.8   |  25  |  0.85    Ca    8.7      2022 07:33    TPro  6.7  /  Alb  2.9<L>  /  TBili  0.4  /  DBili  x   /  AST  21  /  ALT  23  /  AlkPhos  111  06-14    CAPILLARY BLOOD GLUCOSE      POCT Blood Glucose.: 102 mg/dL (2022 08:06)  POCT Blood Glucose.: 191 mg/dL (2022 20:59)  POCT Blood Glucose.: 116 mg/dL (2022 17:08)  POCT Blood Glucose.: 179 mg/dL (2022 11:23)      Urinalysis Basic - ( 2022 13:20 )    Color: Yellow / Appearance: Clear / S.005 / pH: x  Gluc: x / Ketone: Negative  / Bili: Negative / Urobili: Negative mg/dL   Blood: x / Protein: 15 mg/dL / Nitrite: Negative   Leuk Esterase: Small / RBC: 0-2 /HPF / WBC 3-5   Sq Epi: x / Non Sq Epi: Occasional / Bacteria: Few      I&O's Summary    2022 07:01  -  2022 07:00  --------------------------------------------------------  IN: 840 mL / OUT: 1000 mL / NET: -160 mL      Vital Signs Last 24 Hrs  T(C): 36.5 (2022 05:03), Max: 37 (2022 16:05)  T(F): 97.7 (2022 05:03), Max: 98.6 (2022 16:05)  HR: 63 (2022 05:03) (61 - 77)  BP: 176/86 (2022 05:03) (147/76 - 176/86)  BP(mean): --  RR: 18 (2022 05:03) (18 - 18)  SpO2: 96% (2022 05:03) (96% - 98%)      On Neurological Examination:    Mental Status - Patient is alert, awake, oriented X3. fluent, names, no dysarthria no aphasia Follows commands well and able to answer questions appropriately. Mood and affect  normal    Cranial Nerves - PERRL, EOMI, VFF, V1-V3 intact, no gross facial asymmetry, tongue/uvula midline    Motor Exam -   no drift     nml bulk/tone    Sensory    Intact to light touch and pinprick bilaterally    Coord: very subtle LUE dysmetrai    Gait -  ubsteady       < from: CT Head No Cont (22 @ 08:25) >  IMPRESSION:    No hydrocephalus, acute intracranial hemorrhage, mass effect, or brain   edema.    --- End of Report ---      < end of copied text >                  
Patient is a 77y old  Female who presents with a chief complaint of Dizziness (2022 11:08)      SUBJECTIVE/OBJECTIVE:    Less dizziness    MEDICATIONS  (STANDING):  aspirin  chewable 81 milliGRAM(s) Oral daily  atorvastatin 40 milliGRAM(s) Oral at bedtime  dextrose 5%. 1000 milliLiter(s) (50 mL/Hr) IV Continuous <Continuous>  dextrose 50% Injectable 25 Gram(s) IV Push once  dextrose 50% Injectable 12.5 Gram(s) IV Push once  dextrose 50% Injectable 25 Gram(s) IV Push once  enoxaparin Injectable 40 milliGRAM(s) SubCutaneous every 24 hours  glucagon  Injectable 1 milliGRAM(s) IntraMuscular once  influenza  Vaccine (HIGH DOSE) 0.7 milliLiter(s) IntraMuscular once  insulin glargine Injectable (LANTUS) 30 Unit(s) SubCutaneous at bedtime  insulin lispro (ADMELOG) corrective regimen sliding scale   SubCutaneous three times a day before meals  meclizine 50 milliGRAM(s) Oral two times a day  metoprolol succinate ER 50 milliGRAM(s) Oral daily  ondansetron Injectable 4 milliGRAM(s) IV Push every 6 hours  pantoprazole    Tablet 40 milliGRAM(s) Oral before breakfast  sodium chloride 0.9%. 1000 milliLiter(s) (70 mL/Hr) IV Continuous <Continuous>  valsartan 160 milliGRAM(s) Oral daily    MEDICATIONS  (PRN):  acetaminophen     Tablet .. 650 milliGRAM(s) Oral every 6 hours PRN Temp greater or equal to 38C (100.4F), Mild Pain (1 - 3)  aluminum hydroxide/magnesium hydroxide/simethicone Suspension 30 milliLiter(s) Oral every 4 hours PRN Dyspepsia  dextrose Oral Gel 15 Gram(s) Oral once PRN Blood Glucose LESS THAN 70 milliGRAM(s)/deciliter  melatonin 3 milliGRAM(s) Oral at bedtime PRN Insomnia      Allergies    No Known Allergies    Intolerances          Vital Signs Last 24 Hrs  T(C): 36.5 (2022 11:14), Max: 37 (2022 16:05)  T(F): 97.7 (2022 11:14), Max: 98.6 (2022 16:05)  HR: 66 (2022 11:14) (62 - 77)  BP: 134/74 (2022 11:14) (134/74 - 176/86)  BP(mean): --  RR: 18 (2022 11:14) (18 - 18)  SpO2: 97% (2022 11:14) (96% - 97%)    PHYSICAL EXAM:  GENERAL: NAD, well-groomed, well-developed  HEAD:  Atraumatic, Normocephalic  EYES: EOMI, PERRLA, conjunctiva and sclera clear  ENMT: No tonsillar erythema, exudates, or enlargement; Moist mucous membranes, No lesions  NECK: Supple, No JVD, Normal thyroid  NERVOUS SYSTEM:  Alert & Oriented X3; Motor Strength 5/5 B/L upper and lower extremities; DTRs 2+ intact and symmetric  CHEST/LUNG: Clear bilaterally; No rales, rhonchi, wheezing, or rubs  HEART: Regular rate and rhythm; No murmurs, rubs, or gallops  ABDOMEN: Soft, Nontender, Nondistended; Bowel sounds present  EXTREMITIES:  2+ Peripheral Pulses, No clubbing, cyanosis, or edema  LYMPH: No lymphadenopathy noted  SKIN: No rashes or lesions    LABS:                        10.7   3.37  )-----------( 233      ( 2022 07:33 )             34.7     06-14    142  |  111<H>  |  12  ----------------------------<  123<H>  3.8   |  25  |  0.85    Ca    8.7      2022 07:33    TPro  6.7  /  Alb  2.9<L>  /  TBili  0.4  /  DBili  x   /  AST  21  /  ALT  23  /  AlkPhos  111  06-14      Urinalysis Basic - ( 2022 13:20 )    Color: Yellow / Appearance: Clear / S.005 / pH: x  Gluc: x / Ketone: Negative  / Bili: Negative / Urobili: Negative mg/dL   Blood: x / Protein: 15 mg/dL / Nitrite: Negative   Leuk Esterase: Small / RBC: 0-2 /HPF / WBC 3-5   Sq Epi: x / Non Sq Epi: Occasional / Bacteria: Few      CAPILLARY BLOOD GLUCOSE      POCT Blood Glucose.: 168 mg/dL (2022 11:23)  POCT Blood Glucose.: 102 mg/dL (2022 08:06)  POCT Blood Glucose.: 191 mg/dL (2022 20:59)  POCT Blood Glucose.: 116 mg/dL (2022 17:08)          RADIOLOGY & ADDITIONAL TESTS:        Consultant(s) Notes Reviewed:  [xxx ] YES  [ ] NO  
Patient is a 77y old  Female who presents with a chief complaint of Dizziness (2022 10:18)      SUBJECTIVE/OBJECTIVE:    Without complaints. Patient resting comfortably.     MEDICATIONS  (STANDING):  atorvastatin 80 milliGRAM(s) Oral at bedtime  dextrose 5%. 1000 milliLiter(s) (50 mL/Hr) IV Continuous <Continuous>  dextrose 50% Injectable 25 Gram(s) IV Push once  dextrose 50% Injectable 12.5 Gram(s) IV Push once  dextrose 50% Injectable 25 Gram(s) IV Push once  enoxaparin Injectable 40 milliGRAM(s) SubCutaneous every 24 hours  glucagon  Injectable 1 milliGRAM(s) IntraMuscular once  influenza  Vaccine (HIGH DOSE) 0.7 milliLiter(s) IntraMuscular once  insulin glargine Injectable (LANTUS) 30 Unit(s) SubCutaneous at bedtime  insulin lispro (ADMELOG) corrective regimen sliding scale   SubCutaneous three times a day before meals  meclizine 50 milliGRAM(s) Oral two times a day  metoprolol succinate ER 50 milliGRAM(s) Oral daily  ondansetron Injectable 4 milliGRAM(s) IV Push every 6 hours  pantoprazole    Tablet 40 milliGRAM(s) Oral before breakfast  sodium chloride 0.9%. 1000 milliLiter(s) (70 mL/Hr) IV Continuous <Continuous>  valsartan 160 milliGRAM(s) Oral daily    MEDICATIONS  (PRN):  acetaminophen     Tablet .. 650 milliGRAM(s) Oral every 6 hours PRN Temp greater or equal to 38C (100.4F), Mild Pain (1 - 3)  aluminum hydroxide/magnesium hydroxide/simethicone Suspension 30 milliLiter(s) Oral every 4 hours PRN Dyspepsia  dextrose Oral Gel 15 Gram(s) Oral once PRN Blood Glucose LESS THAN 70 milliGRAM(s)/deciliter  melatonin 3 milliGRAM(s) Oral at bedtime PRN Insomnia      Allergies    No Known Allergies    Intolerances          Vital Signs Last 24 Hrs  T(C): 36.9 (2022 11:09), Max: 37.2 (2022 19:12)  T(F): 98.5 (2022 11:09), Max: 98.9 (2022 19:12)  HR: 61 (2022 11:09) (61 - 86)  BP: 167/84 (2022 11:09) (147/76 - 186/96)  BP(mean): --  RR: 18 (2022 11:09) (12 - 18)  SpO2: 98% (2022 11:09) (95% - 99%)    PHYSICAL EXAM:  GENERAL: NAD, well-groomed, well-developed  HEAD:  Atraumatic, Normocephalic  EYES: EOMI, PERRLA, conjunctiva and sclera clear  ENMT: No tonsillar erythema, exudates, or enlargement; Moist mucous membranes, No lesions  NECK: Supple, No JVD, Normal thyroid  NERVOUS SYSTEM:  Alert & Oriented X3; Motor Strength 5/5 B/L upper and lower extremities; DTRs 2+ intact and symmetric  CHEST/LUNG: Clear bilaterally; No rales, rhonchi, wheezing, or rubs  HEART: Regular rate and rhythm; No murmurs, rubs, or gallops  ABDOMEN: Soft, Nontender, Nondistended; Bowel sounds present  EXTREMITIES:  2+ Peripheral Pulses, No clubbing, cyanosis, or edema  LYMPH: No lymphadenopathy noted  SKIN: No rashes or lesions    LABS:                        10.5   2.88  )-----------( 219      ( 2022 08:45 )             33.6     06-13    141  |  109<H>  |  7   ----------------------------<  86  3.0<L>   |  25  |  0.77    Ca    8.7      2022 08:45    TPro  6.8  /  Alb  3.0<L>  /  TBili  0.6  /  DBili  x   /  AST  20  /  ALT  20  /  AlkPhos  111  06-13    PT/INR - ( 2022 08:13 )   PT: 12.5 sec;   INR: 1.05 ratio         PTT - ( 2022 08:13 )  PTT:30.4 sec  Urinalysis Basic - ( 2022 13:20 )    Color: Yellow / Appearance: Clear / S.005 / pH: x  Gluc: x / Ketone: Negative  / Bili: Negative / Urobili: Negative mg/dL   Blood: x / Protein: 15 mg/dL / Nitrite: Negative   Leuk Esterase: Small / RBC: 0-2 /HPF / WBC 3-5   Sq Epi: x / Non Sq Epi: Occasional / Bacteria: Few      CAPILLARY BLOOD GLUCOSE      POCT Blood Glucose.: 179 mg/dL (2022 11:23)  POCT Blood Glucose.: 89 mg/dL (2022 08:17)  POCT Blood Glucose.: 165 mg/dL (2022 21:37)  POCT Blood Glucose.: 125 mg/dL (2022 15:36)  A1C with Estimated Average Glucose Result: 8.9: Method: Immunoassay         RADIOLOGY & ADDITIONAL TESTS:    < from: Xray Chest 1 View-PORTABLE IMMEDIATE (Xray Chest 1 View-PORTABLE IMMEDIATE .) (22 @ 08:40) >  INTERPRETATION:  HISTORY: ; ; dizziness;  TECHNIQUE: Portable frontal view of the chest, 1 view.  COMPARISON: none.  FINDINGS/  IMPRESSION:    HEART: normal in size  LUNGS: free of consolidation,effusion, or pneumothorax.  BONES: within normal limits      < end of copied text >  < from: CT Head No Cont (22 @ 08:25) >  FINDINGS:    No hydrocephalus, mass effect, midline shift, acute intracranial   hemorrhage, or brain edema.    Similar-appearing right frontal bone defect may represent a desirae hole,   hemangioma, or large arachnoid granulation.    Visualized paranasal sinuses and mastoid air cells are clear.    IMPRESSION:    No hydrocephalus, acute intracranial hemorrhage, mass effect, or brain   edema.    < end of copied text >      Consultant(s) Notes Reviewed:  [ ] YES  [ ] NO

## 2022-06-15 NOTE — DISCHARGE NOTE NURSING/CASE MANAGEMENT/SOCIAL WORK - NSDCVIVACCINE_GEN_ALL_CORE_FT
Tdap; 04-Jan-2018 17:05; Delia Arellano (RN); [employer]; [id number]; IntraMuscular; Deltoid Left.; 0.5 milliLiter(s); VIS (VIS Published: 09-May-2013, VIS Presented: 04-Jan-2018);

## 2022-06-15 NOTE — DIETITIAN INITIAL EVALUATION ADULT - NS FNS WEIGHT CHANGE REASON
Pt reports usual wt. of 179 LBS, c wt. loss PTA, but unable to provide accurate wt. history/unintentional

## 2022-06-15 NOTE — DIETITIAN INITIAL EVALUATION ADULT - OTHER INFO
Pt states that she has poor appetite @ times.  Pt did the shopping/cooking.  Diet PTA: unrestricted, pt denies food allergies/intolerance, chewing/swallowing difficulty.   As per diet hx obtained, pt does not eat till 1pm as she does not feel hungry, therefore c inconsistent CHO intake.  Pt demonstrated knowledge regarding nutrition therapy for diabetes & blood glucose goals, appeared to need simple teaching methods.  Pt was not routinely self monitoring for blood glucose, does report symptoms of hypo/hyperglycemia.   Pt was taking Lantus 30 units at night & metformin.  Pt has not seen an Endocrinologist.   Pt educated on consistent CHO intake c meal planning c plate method,  blood glucose goals, sick day management & low blood sugar for hypo/hyperglycemia symptoms management.   Encouraged adherence c self monitoring of blood glucose.   Encouraged regular meal times to ensure adequate protein energy intake.  Discharge plans noted.

## 2022-06-15 NOTE — DIETITIAN NUTRITION RISK NOTIFICATION - TREATMENT: THE FOLLOWING DIET HAS BEEN RECOMMENDED
Diet, Consistent Carbohydrate w/Evening Snack:   Low Sodium  Supplement Feeding Modality:  Oral  Glucerna Shake Cans or Servings Per Day:  1       Frequency:  Daily (06-15-22 @ 10:46) [Pending Verification By Attending]  Diet, Regular:   Consistent Carbohydrate {Evening Snack} (06-13-22 @ 10:49) [Active]

## 2022-06-15 NOTE — DISCHARGE NOTE NURSING/CASE MANAGEMENT/SOCIAL WORK - NSDCPEFALRISK_GEN_ALL_CORE
For information on Fall & Injury Prevention, visit: [email or web address] OR  [email or web address] OR  https://www.cdc.gov/steadi/patient.html

## 2022-06-15 NOTE — DIETITIAN INITIAL EVALUATION ADULT - PERTINENT MEDS FT
MEDICATIONS  (STANDING):  aspirin  chewable 81 milliGRAM(s) Oral daily  atorvastatin 40 milliGRAM(s) Oral at bedtime  dextrose 5%. 1000 milliLiter(s) (50 mL/Hr) IV Continuous <Continuous>  dextrose 50% Injectable 25 Gram(s) IV Push once  dextrose 50% Injectable 12.5 Gram(s) IV Push once  dextrose 50% Injectable 25 Gram(s) IV Push once  enoxaparin Injectable 40 milliGRAM(s) SubCutaneous every 24 hours  glucagon  Injectable 1 milliGRAM(s) IntraMuscular once  influenza  Vaccine (HIGH DOSE) 0.7 milliLiter(s) IntraMuscular once  insulin glargine Injectable (LANTUS) 30 Unit(s) SubCutaneous at bedtime  insulin lispro (ADMELOG) corrective regimen sliding scale   SubCutaneous three times a day before meals  meclizine 50 milliGRAM(s) Oral two times a day  metoprolol succinate ER 50 milliGRAM(s) Oral daily  ondansetron Injectable 4 milliGRAM(s) IV Push every 6 hours  pantoprazole    Tablet 40 milliGRAM(s) Oral before breakfast  sodium chloride 0.9%. 1000 milliLiter(s) (70 mL/Hr) IV Continuous <Continuous>  valsartan 160 milliGRAM(s) Oral daily    MEDICATIONS  (PRN):  acetaminophen     Tablet .. 650 milliGRAM(s) Oral every 6 hours PRN Temp greater or equal to 38C (100.4F), Mild Pain (1 - 3)  aluminum hydroxide/magnesium hydroxide/simethicone Suspension 30 milliLiter(s) Oral every 4 hours PRN Dyspepsia  dextrose Oral Gel 15 Gram(s) Oral once PRN Blood Glucose LESS THAN 70 milliGRAM(s)/deciliter  melatonin 3 milliGRAM(s) Oral at bedtime PRN Insomnia

## 2022-06-15 NOTE — DISCHARGE NOTE NURSING/CASE MANAGEMENT/SOCIAL WORK - PATIENT PORTAL LINK FT
You can access the FollowMyHealth Patient Portal offered by [facility] by registering at the following website: [email or web address]. By joining [facility]’s FollowMyHealth portal, you will also be able to view your health information using other applications (apps) compatible with our system.

## 2022-06-15 NOTE — DIETITIAN INITIAL EVALUATION ADULT - ADD RECOMMEND
outpatient Endocrine consult, sick day c diabetes, low glucose, Directory of ambulatory nutrition services

## 2022-06-17 NOTE — ED PROVIDER NOTE - NS ED MD DISPO ISOLATION
Lab Results   Component Value Date    [id number] 3 492 05/24/2022      Synthroid is held, resume after cleared for PO by GI None

## 2022-06-21 DIAGNOSIS — H81.399 OTHER PERIPHERAL VERTIGO, UNSPECIFIED EAR: ICD-10-CM

## 2022-06-21 DIAGNOSIS — I10 ESSENTIAL (PRIMARY) HYPERTENSION: ICD-10-CM

## 2022-06-21 DIAGNOSIS — Z79.4 LONG TERM (CURRENT) USE OF INSULIN: ICD-10-CM

## 2022-06-21 DIAGNOSIS — E11.9 TYPE 2 DIABETES MELLITUS WITHOUT COMPLICATIONS: ICD-10-CM

## 2022-06-21 DIAGNOSIS — E44.0 MODERATE PROTEIN-CALORIE MALNUTRITION: ICD-10-CM

## 2022-10-08 NOTE — DIETITIAN INITIAL EVALUATION ADULT - PERTINENT LABORATORY DATA
No 06-14    142  |  111<H>  |  12  ----------------------------<  123<H>  3.8   |  25  |  0.85    Ca    8.7      14 Jun 2022 07:33    TPro  6.7  /  Alb  2.9<L>  /  TBili  0.4  /  DBili  x   /  AST  21  /  ALT  23  /  AlkPhos  111  06-14  POCT Blood Glucose.: 138 mg/dL (06-15-22 @ 07:58)  A1C with Estimated Average Glucose Result: 8.9 %<H>/estimated average Glucose 209 mg/dL<H> (06-13-22 @ 11:00)

## 2023-02-14 NOTE — PATIENT PROFILE ADULT - NSPROIMPLANTSMEDDEV_GEN_A_NUR
I have been informed patient was in ER for palpitations. He has a zio patch. Needs new patient appt with any MD in 1-2 weeks   
Strong peripheral pulses
None

## 2023-05-02 NOTE — DISCHARGE NOTE ADULT - NURSING SECTION COMPLETE
See wound care assessment documentation in chart review. Scanned under media tab.        Patient/Caregiver provided printed discharge information.

## 2023-06-27 NOTE — ED PROVIDER NOTE - OBJECTIVE STATEMENT
Vaccine status unknown
75y F w/ PMHx HTN, DM presenting with R knee pain and L shoulder and rib pain after fall 1.5 weeks ago. Patient states while stepping out of a vehicle, she accidently stepped onto curb, loss her footing and fell forward. Denies LOC, states she thinks she hit her head on the pavement, did not go to ED after fall as patient states she did not want to go. Coming to ED today because family is pushing her to be evaluated for continuation of her pain. Not on AC. Denies visual changes, cp, sob, abd pain, n/v, focal weakness/numbness.

## 2023-12-05 NOTE — ED PROVIDER NOTE - RESPIRATORY NEGATIVE STATEMENT, MLM
Action 12.5.23 jm   Action Taken Faxed imaging request to [facility] for the 11.29.23 DX Hip/Spine.         DIAGNOSIS: New bone health, Xiao patient     APPOINTMENT DATE: 12.6.23   NOTES STATUS DETAILS   OFFICE NOTE from referring provider Internal 11.16.23  [facility]  Ortho   OFFICE NOTE from other specialist CE 11.14.23, 10.31.23, 10.10.23 + more with  Maricruz  [facility]    11.6.23  Aleman   Ortho    8.31.23  Terrence   [facility]    7.27.23, 6.29.23  Torbet   Ortho   MEDICATION LIST Internal    MRI Pacs 10.23.23  MR Lumbar Spine   DEXA (osteoporosis/bone health) Pacs 11.29.23-requested  DX Hip/Spine    7.5.22, 7.12.19  DX Hip/Spine   CT SCAN Pacs 11.1.23  CT Lumbar Spine   XRAYS (IMAGES & REPORTS) Pacs 11.6.23  XR Pelvis    4.26.23  XR Hip/Pelvis Left           
no chest pain, no cough, and no shortness of breath.

## 2024-01-29 NOTE — H&P ADULT - PROBLEM SELECTOR PLAN 2
Patient presents to ED with chest pain, SOB, and dizziness states \"it is all from my hernia\". Was seen last week and dx with hernia, has had these issues since prostate CA surgery in 2021, came today because pain was too severe. Denies n/v/d, endorses constipation. VAN negative, no vision or gait changes   FS with Insulin and sliding scale

## 2024-02-09 NOTE — PATIENT PROFILE ADULT. - ASSIST WITH
Detail Level: Detailed Depth Of Biopsy: dermis Was A Bandage Applied: Yes Size Of Lesion In Cm: 0.2 X Size Of Lesion In Cm: 0 Biopsy Type: H and E Biopsy Method: Dermablade Anesthesia Type: 1% lidocaine with epinephrine Anesthesia Volume In Cc: 0.5 Hemostasis: Drysol Wound Care: Petrolatum Dressing: bandage Destruction After The Procedure: No Type Of Destruction Used: Curettage Curettage Text: The wound bed was treated with curettage after the biopsy was performed. Cryotherapy Text: The wound bed was treated with cryotherapy after the biopsy was performed. Electrodesiccation Text: The wound bed was treated with electrodesiccation after the biopsy was performed. Electrodesiccation And Curettage Text: The wound bed was treated with electrodesiccation and curettage after the biopsy was performed. Silver Nitrate Text: The wound bed was treated with silver nitrate after the biopsy was performed. Lab: -[id number] Consent: Written consent was obtained and risks were reviewed including but not limited to scarring, infection, bleeding, scabbing, incomplete removal, nerve damage and allergy to anesthesia. Post-Care Instructions: I reviewed with the patient in detail post-care instructions. Patient is to keep the biopsy site dry overnight, and then apply bacitracin twice daily until healed. Patient may apply hydrogen peroxide soaks to remove any crusting. Notification Instructions: Patient will be notified of biopsy results. However, patient instructed to call the office if not contacted within 2 weeks. Billing Type: Third-Party Bill Information: Selecting Yes will display possible errors in your note based on the variables you have selected. This validation is only offered as a suggestion for you. PLEASE NOTE THAT THE VALIDATION TEXT WILL BE REMOVED WHEN YOU FINALIZE YOUR NOTE. IF YOU WANT TO FAX A PRELIMINARY NOTE YOU WILL NEED TO TOGGLE THIS TO 'NO' IF YOU DO NOT WANT IT IN YOUR FAXED NOTE. standing/toileting/walking

## 2024-03-21 RX ORDER — METOPROLOL TARTRATE 50 MG
1 TABLET ORAL
Qty: 0 | Refills: 0 | DISCHARGE

## 2024-04-04 ENCOUNTER — EMERGENCY (EMERGENCY)
Facility: HOSPITAL | Age: 79
LOS: 0 days | Discharge: HOME | End: 2024-04-04
Attending: STUDENT IN AN ORGANIZED HEALTH CARE EDUCATION/TRAINING PROGRAM
Payer: COMMERCIAL

## 2024-04-04 VITALS
SYSTOLIC BLOOD PRESSURE: 152 MMHG | HEART RATE: 83 BPM | RESPIRATION RATE: 18 BRPM | DIASTOLIC BLOOD PRESSURE: 93 MMHG | WEIGHT: 177.91 LBS | OXYGEN SATURATION: 100 % | TEMPERATURE: 98 F | HEIGHT: 68 IN

## 2024-04-04 VITALS
DIASTOLIC BLOOD PRESSURE: 112 MMHG | SYSTOLIC BLOOD PRESSURE: 168 MMHG | RESPIRATION RATE: 19 BRPM | TEMPERATURE: 98 F | OXYGEN SATURATION: 100 % | HEART RATE: 80 BPM

## 2024-04-04 DIAGNOSIS — Z98.51 TUBAL LIGATION STATUS: Chronic | ICD-10-CM

## 2024-04-04 DIAGNOSIS — R06.02 SHORTNESS OF BREATH: ICD-10-CM
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
## 2024-04-04 DIAGNOSIS — R07.89 OTHER CHEST PAIN: ICD-10-CM
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
## 2024-04-04 DIAGNOSIS — G89.29 OTHER CHRONIC PAIN: ICD-10-CM

## 2024-04-04 DIAGNOSIS — S52.90XA UNSPECIFIED FRACTURE OF UNSPECIFIED FOREARM, INITIAL ENCOUNTER FOR CLOSED FRACTURE: Chronic | ICD-10-CM

## 2024-04-04 DIAGNOSIS — Z96.7 PRESENCE OF OTHER BONE AND TENDON IMPLANTS: Chronic | ICD-10-CM

## 2024-04-04 DIAGNOSIS — Z98.89 OTHER SPECIFIED POSTPROCEDURAL STATES: Chronic | ICD-10-CM

## 2024-04-04 DIAGNOSIS — I10 ESSENTIAL (PRIMARY) HYPERTENSION: ICD-10-CM

## 2024-04-04 DIAGNOSIS — Z87.81 PERSONAL HISTORY OF (HEALED) TRAUMATIC FRACTURE: Chronic | ICD-10-CM

## 2024-04-04 DIAGNOSIS — E11.9 TYPE 2 DIABETES MELLITUS WITHOUT COMPLICATIONS: ICD-10-CM

## 2024-04-04 DIAGNOSIS — N60.02 SOLITARY CYST OF LEFT BREAST: Chronic | ICD-10-CM

## 2024-04-04 DIAGNOSIS — M79.606 PAIN IN LEG, UNSPECIFIED: ICD-10-CM

## 2024-04-04 PROBLEM — R42 DIZZINESS AND GIDDINESS: Chronic | Status: ACTIVE | Noted: 2022-06-12

## 2024-04-04 PROBLEM — Z86.79 PERSONAL HISTORY OF OTHER DISEASES OF THE CIRCULATORY SYSTEM: Chronic | Status: ACTIVE | Noted: 2021-12-01

## 2024-04-04 LAB
ALBUMIN SERPL ELPH-MCNC: 3.6 G/DL — SIGNIFICANT CHANGE UP (ref 3.3–5)
ALP SERPL-CCNC: 155 U/L — HIGH (ref 40–120)
ALT FLD-CCNC: 27 U/L — SIGNIFICANT CHANGE UP (ref 12–78)
ANION GAP SERPL CALC-SCNC: 8 MMOL/L — SIGNIFICANT CHANGE UP (ref 5–17)
APTT BLD: 30.8 SEC — SIGNIFICANT CHANGE UP (ref 24.5–35.6)
AST SERPL-CCNC: 32 U/L — SIGNIFICANT CHANGE UP (ref 15–37)
BASOPHILS # BLD AUTO: 0.02 K/UL — SIGNIFICANT CHANGE UP (ref 0–0.2)
BASOPHILS NFR BLD AUTO: 0.5 % — SIGNIFICANT CHANGE UP (ref 0–2)
BILIRUB SERPL-MCNC: 0.3 MG/DL — SIGNIFICANT CHANGE UP (ref 0.2–1.2)
BUN SERPL-MCNC: 12 MG/DL — SIGNIFICANT CHANGE UP (ref 7–23)
CALCIUM SERPL-MCNC: 9 MG/DL — SIGNIFICANT CHANGE UP (ref 8.5–10.1)
CHLORIDE SERPL-SCNC: 112 MMOL/L — HIGH (ref 96–108)
CO2 SERPL-SCNC: 23 MMOL/L — SIGNIFICANT CHANGE UP (ref 22–31)
CREAT SERPL-MCNC: 0.67 MG/DL — SIGNIFICANT CHANGE UP (ref 0.5–1.3)
EGFR: 89 ML/MIN/1.73M2 — SIGNIFICANT CHANGE UP
EOSINOPHIL # BLD AUTO: 0 K/UL — SIGNIFICANT CHANGE UP (ref 0–0.5)
EOSINOPHIL NFR BLD AUTO: 0 % — SIGNIFICANT CHANGE UP (ref 0–6)
GLUCOSE SERPL-MCNC: 94 MG/DL — SIGNIFICANT CHANGE UP (ref 70–99)
HCT VFR BLD CALC: 35.4 % — SIGNIFICANT CHANGE UP (ref 34.5–45)
HGB BLD-MCNC: 10.9 G/DL — LOW (ref 11.5–15.5)
IMM GRANULOCYTES NFR BLD AUTO: 0.2 % — SIGNIFICANT CHANGE UP (ref 0–0.9)
INR BLD: 0.97 RATIO — SIGNIFICANT CHANGE UP (ref 0.85–1.18)
LYMPHOCYTES # BLD AUTO: 0.62 K/UL — LOW (ref 1–3.3)
LYMPHOCYTES # BLD AUTO: 15 % — SIGNIFICANT CHANGE UP (ref 13–44)
MCHC RBC-ENTMCNC: 23.4 PG — LOW (ref 27–34)
MCHC RBC-ENTMCNC: 30.8 G/DL — LOW (ref 32–36)
MCV RBC AUTO: 76.1 FL — LOW (ref 80–100)
MONOCYTES # BLD AUTO: 0.15 K/UL — SIGNIFICANT CHANGE UP (ref 0–0.9)
MONOCYTES NFR BLD AUTO: 3.6 % — SIGNIFICANT CHANGE UP (ref 2–14)
NEUTROPHILS # BLD AUTO: 3.34 K/UL — SIGNIFICANT CHANGE UP (ref 1.8–7.4)
NEUTROPHILS NFR BLD AUTO: 80.7 % — HIGH (ref 43–77)
NRBC # BLD: 0 /100 WBCS — SIGNIFICANT CHANGE UP (ref 0–0)
PLATELET # BLD AUTO: 232 K/UL — SIGNIFICANT CHANGE UP (ref 150–400)
POTASSIUM SERPL-MCNC: 3.5 MMOL/L — SIGNIFICANT CHANGE UP (ref 3.5–5.3)
POTASSIUM SERPL-SCNC: 3.5 MMOL/L — SIGNIFICANT CHANGE UP (ref 3.5–5.3)
PROT SERPL-MCNC: 8.5 GM/DL — HIGH (ref 6–8.3)
PROTHROM AB SERPL-ACNC: 11.5 SEC — SIGNIFICANT CHANGE UP (ref 9.5–13)
RBC # BLD: 4.65 M/UL — SIGNIFICANT CHANGE UP (ref 3.8–5.2)
RBC # FLD: 15.3 % — HIGH (ref 10.3–14.5)
SODIUM SERPL-SCNC: 143 MMOL/L — SIGNIFICANT CHANGE UP (ref 135–145)
TROPONIN I, HIGH SENSITIVITY RESULT: 26.8 NG/L — SIGNIFICANT CHANGE UP
WBC # BLD: 4.14 K/UL — SIGNIFICANT CHANGE UP (ref 3.8–10.5)
WBC # FLD AUTO: 4.14 K/UL — SIGNIFICANT CHANGE UP (ref 3.8–10.5)

## 2024-04-04 PROCEDURE — 93010 ELECTROCARDIOGRAM REPORT: CPT

## 2024-04-04 PROCEDURE — 71250 CT THORAX DX C-: CPT | Mod: 26,MC

## 2024-04-04 PROCEDURE — 99285 EMERGENCY DEPT VISIT HI MDM: CPT

## 2024-04-04 RX ORDER — METHOCARBAMOL 500 MG/1
1 TABLET, FILM COATED ORAL
Qty: 9 | Refills: 0
Start: 2024-04-04 | End: 2024-04-06

## 2024-04-04 RX ORDER — LIDOCAINE 4 G/100G
1 CREAM TOPICAL ONCE
Refills: 0 | Status: COMPLETED | OUTPATIENT
Start: 2024-04-04 | End: 2024-04-04

## 2024-04-04 RX ORDER — METHOCARBAMOL 500 MG/1
750 TABLET, FILM COATED ORAL ONCE
Refills: 0 | Status: COMPLETED | OUTPATIENT
Start: 2024-04-04 | End: 2024-04-04

## 2024-04-04 RX ORDER — KETOROLAC TROMETHAMINE 30 MG/ML
30 SYRINGE (ML) INJECTION ONCE
Refills: 0 | Status: DISCONTINUED | OUTPATIENT
Start: 2024-04-04 | End: 2024-04-04

## 2024-04-04 RX ORDER — ACETAMINOPHEN 500 MG
2 TABLET ORAL
Qty: 32 | Refills: 0
Start: 2024-04-04 | End: 2024-04-07

## 2024-04-04 RX ORDER — LIDOCAINE 4 G/100G
1 CREAM TOPICAL
Qty: 1 | Refills: 0
Start: 2024-04-04 | End: 2024-04-08

## 2024-04-04 RX ORDER — ACETAMINOPHEN 500 MG
1000 TABLET ORAL ONCE
Refills: 0 | Status: COMPLETED | OUTPATIENT
Start: 2024-04-04 | End: 2024-04-04

## 2024-04-04 RX ADMIN — METHOCARBAMOL 750 MILLIGRAM(S): 500 TABLET, FILM COATED ORAL at 11:42

## 2024-04-04 RX ADMIN — Medication 400 MILLIGRAM(S): at 10:11

## 2024-04-04 RX ADMIN — LIDOCAINE 1 PATCH: 4 CREAM TOPICAL at 10:11

## 2024-04-04 RX ADMIN — Medication 1000 MILLIGRAM(S): at 11:11

## 2024-04-04 RX ADMIN — Medication 30 MILLIGRAM(S): at 13:36

## 2024-04-04 RX ADMIN — Medication 30 MILLIGRAM(S): at 14:36

## 2024-04-04 RX ADMIN — Medication 1000 MILLIGRAM(S): at 10:26

## 2024-04-04 NOTE — ED ADULT TRIAGE NOTE - TEMPERATURE IN CELSIUS (DEGREES C)
[Time Spent: ___ minutes] : I have spent [unfilled] minutes of time on the encounter. [>50% of the face to face encounter time was spent on counseling and/or coordination of care for ___] : Greater than 50% of the face to face encounter time was spent on counseling and/or coordination of care for [unfilled] 36.4

## 2024-04-04 NOTE — ED ADULT NURSE NOTE - OBJECTIVE STATEMENT
79 yr old female AOx4. C/o chest pain d/t impact following a fall this morning. Pt reports single accidental fall d/t B/L leg cramping. Fell at home and hit chest. Denies LOC or head trauma. Midsternal CP 10/10. Reproducible pain. Pt denies SOB, N/V/D, fever/chills, h/a, dizziness. PMH of 79 yr old female AOx4. C/o chest pain d/t impact following a fall this morning. Pt reports single accidental fall d/t B/L leg cramping. Fell at home and hit chest. Denies LOC or head trauma. Midsternal CP 10/10. Reproducible pain. Pt denies SOB, N/V/D, fever/chills, h/a, dizziness. PMH of DM, HTN, HLD.

## 2024-04-04 NOTE — ED PROVIDER NOTE - NSFOLLOWUPINSTRUCTIONS_ED_ALL_ED_FT
Rest, drink plenty of fluids.  Advance activity as tolerated.  Continue all previously prescribed medications as directed.  Follow up with your primary care physician in 48-72 hours- bring copies of your results.  Return to the ER for worsening or persistent symptoms, and/or ANY NEW OR CONCERNING SYMPTOMS. If you have issues obtaining follow up, please call: [phone] to obtain a doctor or specialist who takes your insurance in your area.  You may call [phone] to make an appointment with the internal medicine clinic.

## 2024-04-04 NOTE — ED PROVIDER NOTE - NSICDXPASTSURGICALHX_GEN_ALL_CORE_FT
PAST SURGICAL HISTORY:  Breast cyst, left Excision of left breast cyst; 1964    H/O tubal ligation 1983    History of bunionectomy of left great toe     History of cataract extraction bilateral;  (Left 02/013 - right 03/3013)    History of wrist fracture ORIF left wrist ; 2013    Radial fracture     S/P ORIF (open reduction internal fixation) fracture left ankle; ~ 5-6 years ago

## 2024-04-04 NOTE — ED PROVIDER NOTE - ATTENDING APP SHARED VISIT CONTRIBUTION OF CARE
78yo female with pmh dm, htn, presenting after mechanical fall today.  Fell onto anterior chest.  No head strike, loc, ac use.  Denies pain elsewhere.  No sob, numbness, weakness, abd pain, n/v.  Comfortable, resting in stretcher.  TTP anterior chest, no ecchymosis.  No deformities with intact rom.  ECG nonischemic.  Will image eval fractures.  Medicate and reassess.

## 2024-04-04 NOTE — ED ADULT NURSE REASSESSMENT NOTE - NS ED NURSE REASSESS COMMENT FT1
Pt up for dc. Pt resting in bed sleeping. Pt reports no way of getting home and no one available to  pt. SW contacted for dc options

## 2024-04-04 NOTE — ED PROVIDER NOTE - CLINICAL SUMMARY MEDICAL DECISION MAKING FREE TEXT BOX
78 y/o female with dm, htn presents with chest wall pain s/p fall today. Vs stable. Not hypoxic.   Ddx include and not limited to sternal fracture, chest contusion, pneumothorax, cardiac injury.   Will order cardiac labs, ekg, ct chest and treat with pain meds. 80 y/o female with dm, htn presents with chest wall pain s/p fall today. Vs stable. Not hypoxic.   Ddx include and not limited to sternal fracture, chest contusion, pneumothorax, cardiac injury.   Will order cardiac labs, ekg, ct chest and treat with pain meds.    labs reviewed and unremarkable. Troponin negative.   CT chest : Scattered small peripheral foci of bronchial impactions/bronchiolitis.  NO fracture noted.   pt made aware of the ct results . Pt reports feeling better.   Pt stable to be discharged home and follow up with PMD.   Rx: Robaxin, tylenol and lidocaine patch.

## 2024-04-04 NOTE — ED PROVIDER NOTE - NSICDXPASTMEDICALHX_GEN_ALL_CORE_FT
PAST MEDICAL HISTORY:  Anemia     Diabetes mellitus type II     DM2 (diabetes mellitus, type 2)     H/O: HTN (hypertension)     Headache     HTN (hypertension), benign     Hypercholesterolemia     Radius fracture left    Vertigo

## 2024-04-04 NOTE — ED ADULT NURSE NOTE - NSFALLRISKINTERV_ED_ALL_ED
Assistance OOB with selected safe patient handling equipment if applicable/Assistance with ambulation/Communicate fall risk and risk factors to all staff, patient, and family/Monitor gait and stability/Provide visual cue: yellow wristband, yellow gown, etc/Reinforce activity limits and safety measures with patient and family/Call bell, personal items and telephone in reach/Instruct patient to call for assistance before getting out of bed/chair/stretcher/Non-slip footwear applied when patient is off stretcher/[place] to call system/Physically safe environment - no spills, clutter or unnecessary equipment/Purposeful Proactive Rounding/Room/bathroom lighting operational, light cord in reach

## 2024-04-04 NOTE — ED PROVIDER NOTE - PHYSICAL EXAMINATION
GEN: Awake, alert, interactive, NAD.  HEAD AND NECK: NC/AT. Airway patent. Neck supple.   EYES:  Clear b/l. EOMI. PERRL.   ENT: Moist mucus membranes.   CARDIAC: Regular rate, regular rhythm. No evident pedal edema.    RESP/CHEST: Normal respiratory effort with no use of accessory muscles or retractions. Clear throughout on auscultation. (+) mid sternal chest wall tenderness to palpation, (-) crepitus, (-) bruising.   ABD: soft, non-distended, non-tender. No rebound, no guarding.   BACK: No midline spinal TTP. No CVAT.   EXTREMITIES: Moving all extremities with no apparent deformities.   SKIN: Warm, dry, intact normal color. No rash.   NEURO: AOx3, CN II-XII grossly intact, no focal deficits.   PSYCH: Appropriate mood and affect.

## 2024-04-04 NOTE — ED PROVIDER NOTE - OBJECTIVE STATEMENT
17
80 y/o female with dm, htn presents with mid sternal chest pain today s/p fall. Pt states she has chronic leg cramps , loss balance and fell onto her chest. Pt denies prodromal symptoms. Reports having chest pain worse with movement and pain with deep breathing. Denies fever, cough, vomiting, abdominal pain, recent travel. Denies smoking, drinking, drugs.

## 2024-04-04 NOTE — ED PROVIDER NOTE - PATIENT PORTAL LINK FT
You can access the FollowMyHealth Patient Portal offered by [facility] by registering at the following website: [email or web address]. By joining [facility]’s FollowMyHealth portal, you will also be able to view your health information using other applications (apps) compatible with our system.

## 2024-04-12 ENCOUNTER — INPATIENT (INPATIENT)
Facility: HOSPITAL | Age: 79
LOS: 2 days | Discharge: HOME HEALTH | End: 2024-04-15
Attending: INTERNAL MEDICINE | Admitting: INTERNAL MEDICINE
Payer: COMMERCIAL

## 2024-04-12 ENCOUNTER — RESULT REVIEW (OUTPATIENT)
Age: 79
End: 2024-04-12

## 2024-04-12 VITALS
WEIGHT: 177.91 LBS | HEART RATE: 86 BPM | DIASTOLIC BLOOD PRESSURE: 106 MMHG | RESPIRATION RATE: 22 BRPM | OXYGEN SATURATION: 99 % | HEIGHT: 68 IN | SYSTOLIC BLOOD PRESSURE: 181 MMHG

## 2024-04-12 DIAGNOSIS — Z98.89 OTHER SPECIFIED POSTPROCEDURAL STATES: Chronic | ICD-10-CM

## 2024-04-12 DIAGNOSIS — R29.6 REPEATED FALLS: ICD-10-CM

## 2024-04-12 DIAGNOSIS — Z98.51 TUBAL LIGATION STATUS: Chronic | ICD-10-CM

## 2024-04-12 DIAGNOSIS — Z87.81 PERSONAL HISTORY OF (HEALED) TRAUMATIC FRACTURE: Chronic | ICD-10-CM

## 2024-04-12 DIAGNOSIS — R55 SYNCOPE AND COLLAPSE: ICD-10-CM

## 2024-04-12 DIAGNOSIS — Z96.7 PRESENCE OF OTHER BONE AND TENDON IMPLANTS: Chronic | ICD-10-CM

## 2024-04-12 DIAGNOSIS — I10 ESSENTIAL (PRIMARY) HYPERTENSION: ICD-10-CM

## 2024-04-12 DIAGNOSIS — S52.90XA UNSPECIFIED FRACTURE OF UNSPECIFIED FOREARM, INITIAL ENCOUNTER FOR CLOSED FRACTURE: Chronic | ICD-10-CM

## 2024-04-12 DIAGNOSIS — E11.8 TYPE 2 DIABETES MELLITUS WITH UNSPECIFIED COMPLICATIONS: ICD-10-CM

## 2024-04-12 DIAGNOSIS — N60.02 SOLITARY CYST OF LEFT BREAST: Chronic | ICD-10-CM

## 2024-04-12 DIAGNOSIS — E78.5 HYPERLIPIDEMIA, UNSPECIFIED: ICD-10-CM

## 2024-04-12 LAB
ALBUMIN SERPL ELPH-MCNC: 3.9 G/DL — SIGNIFICANT CHANGE UP (ref 3.3–5)
ALP SERPL-CCNC: 148 U/L — HIGH (ref 40–120)
ALT FLD-CCNC: 25 U/L — SIGNIFICANT CHANGE UP (ref 12–78)
ANION GAP SERPL CALC-SCNC: 8 MMOL/L — SIGNIFICANT CHANGE UP (ref 5–17)
APPEARANCE UR: CLEAR — SIGNIFICANT CHANGE UP
AST SERPL-CCNC: 32 U/L — SIGNIFICANT CHANGE UP (ref 15–37)
BACTERIA # UR AUTO: ABNORMAL /HPF
BASOPHILS # BLD AUTO: 0.01 K/UL — SIGNIFICANT CHANGE UP (ref 0–0.2)
BASOPHILS NFR BLD AUTO: 0.2 % — SIGNIFICANT CHANGE UP (ref 0–2)
BILIRUB SERPL-MCNC: 0.3 MG/DL — SIGNIFICANT CHANGE UP (ref 0.2–1.2)
BILIRUB UR-MCNC: NEGATIVE — SIGNIFICANT CHANGE UP
BUN SERPL-MCNC: 8 MG/DL — SIGNIFICANT CHANGE UP (ref 7–23)
CALCIUM SERPL-MCNC: 9.4 MG/DL — SIGNIFICANT CHANGE UP (ref 8.5–10.1)
CHLORIDE SERPL-SCNC: 111 MMOL/L — HIGH (ref 96–108)
CO2 SERPL-SCNC: 23 MMOL/L — SIGNIFICANT CHANGE UP (ref 22–31)
COLOR SPEC: YELLOW — SIGNIFICANT CHANGE UP
CREAT SERPL-MCNC: 0.88 MG/DL — SIGNIFICANT CHANGE UP (ref 0.5–1.3)
DIFF PNL FLD: NEGATIVE — SIGNIFICANT CHANGE UP
EGFR: 67 ML/MIN/1.73M2 — SIGNIFICANT CHANGE UP
EOSINOPHIL # BLD AUTO: 0.02 K/UL — SIGNIFICANT CHANGE UP (ref 0–0.5)
EOSINOPHIL NFR BLD AUTO: 0.5 % — SIGNIFICANT CHANGE UP (ref 0–6)
EPI CELLS # UR: SIGNIFICANT CHANGE UP
GLUCOSE BLDC GLUCOMTR-MCNC: 219 MG/DL — HIGH (ref 70–99)
GLUCOSE BLDC GLUCOMTR-MCNC: 96 MG/DL — SIGNIFICANT CHANGE UP (ref 70–99)
GLUCOSE SERPL-MCNC: 83 MG/DL — SIGNIFICANT CHANGE UP (ref 70–99)
GLUCOSE UR QL: NEGATIVE MG/DL — SIGNIFICANT CHANGE UP
HCT VFR BLD CALC: 37.2 % — SIGNIFICANT CHANGE UP (ref 34.5–45)
HGB BLD-MCNC: 11.7 G/DL — SIGNIFICANT CHANGE UP (ref 11.5–15.5)
IMM GRANULOCYTES NFR BLD AUTO: 0.2 % — SIGNIFICANT CHANGE UP (ref 0–0.9)
KETONES UR-MCNC: NEGATIVE MG/DL — SIGNIFICANT CHANGE UP
LEUKOCYTE ESTERASE UR-ACNC: ABNORMAL
LYMPHOCYTES # BLD AUTO: 1.35 K/UL — SIGNIFICANT CHANGE UP (ref 1–3.3)
LYMPHOCYTES # BLD AUTO: 31.5 % — SIGNIFICANT CHANGE UP (ref 13–44)
MAGNESIUM SERPL-MCNC: 1.9 MG/DL — SIGNIFICANT CHANGE UP (ref 1.6–2.6)
MCHC RBC-ENTMCNC: 23.8 PG — LOW (ref 27–34)
MCHC RBC-ENTMCNC: 31.5 G/DL — LOW (ref 32–36)
MCV RBC AUTO: 75.8 FL — LOW (ref 80–100)
MONOCYTES # BLD AUTO: 0.21 K/UL — SIGNIFICANT CHANGE UP (ref 0–0.9)
MONOCYTES NFR BLD AUTO: 4.9 % — SIGNIFICANT CHANGE UP (ref 2–14)
NEUTROPHILS # BLD AUTO: 2.68 K/UL — SIGNIFICANT CHANGE UP (ref 1.8–7.4)
NEUTROPHILS NFR BLD AUTO: 62.7 % — SIGNIFICANT CHANGE UP (ref 43–77)
NITRITE UR-MCNC: NEGATIVE — SIGNIFICANT CHANGE UP
NRBC # BLD: 0 /100 WBCS — SIGNIFICANT CHANGE UP (ref 0–0)
PH UR: 6.5 — SIGNIFICANT CHANGE UP (ref 5–8)
PLATELET # BLD AUTO: 272 K/UL — SIGNIFICANT CHANGE UP (ref 150–400)
POTASSIUM SERPL-MCNC: 3.6 MMOL/L — SIGNIFICANT CHANGE UP (ref 3.5–5.3)
POTASSIUM SERPL-SCNC: 3.6 MMOL/L — SIGNIFICANT CHANGE UP (ref 3.5–5.3)
PROT SERPL-MCNC: 9 GM/DL — HIGH (ref 6–8.3)
PROT UR-MCNC: NEGATIVE MG/DL — SIGNIFICANT CHANGE UP
RAPID RVP RESULT: SIGNIFICANT CHANGE UP
RBC # BLD: 4.91 M/UL — SIGNIFICANT CHANGE UP (ref 3.8–5.2)
RBC # FLD: 15 % — HIGH (ref 10.3–14.5)
RBC CASTS # UR COMP ASSIST: SIGNIFICANT CHANGE UP /HPF
SARS-COV-2 RNA SPEC QL NAA+PROBE: SIGNIFICANT CHANGE UP
SODIUM SERPL-SCNC: 142 MMOL/L — SIGNIFICANT CHANGE UP (ref 135–145)
SP GR SPEC: 1.01 — SIGNIFICANT CHANGE UP (ref 1–1.03)
TROPONIN I, HIGH SENSITIVITY RESULT: 15.7 NG/L — SIGNIFICANT CHANGE UP
UROBILINOGEN FLD QL: 0.2 MG/DL — SIGNIFICANT CHANGE UP (ref 0.2–1)
WBC # BLD: 4.28 K/UL — SIGNIFICANT CHANGE UP (ref 3.8–10.5)
WBC # FLD AUTO: 4.28 K/UL — SIGNIFICANT CHANGE UP (ref 3.8–10.5)
WBC UR QL: SIGNIFICANT CHANGE UP /HPF (ref 0–5)

## 2024-04-12 PROCEDURE — 70450 CT HEAD/BRAIN W/O DYE: CPT | Mod: 26,MC

## 2024-04-12 PROCEDURE — 72125 CT NECK SPINE W/O DYE: CPT | Mod: 26,MC

## 2024-04-12 PROCEDURE — 73030 X-RAY EXAM OF SHOULDER: CPT | Mod: 26,RT

## 2024-04-12 PROCEDURE — 71250 CT THORAX DX C-: CPT | Mod: 26,MC

## 2024-04-12 PROCEDURE — 93880 EXTRACRANIAL BILAT STUDY: CPT | Mod: 26

## 2024-04-12 PROCEDURE — 99222 1ST HOSP IP/OBS MODERATE 55: CPT

## 2024-04-12 PROCEDURE — 99285 EMERGENCY DEPT VISIT HI MDM: CPT

## 2024-04-12 PROCEDURE — 93010 ELECTROCARDIOGRAM REPORT: CPT

## 2024-04-12 PROCEDURE — 73521 X-RAY EXAM HIPS BI 2 VIEWS: CPT | Mod: 26

## 2024-04-12 PROCEDURE — 93306 TTE W/DOPPLER COMPLETE: CPT | Mod: 26

## 2024-04-12 RX ORDER — INSULIN LISPRO 100/ML
VIAL (ML) SUBCUTANEOUS
Refills: 0 | Status: DISCONTINUED | OUTPATIENT
Start: 2024-04-12 | End: 2024-04-15

## 2024-04-12 RX ORDER — DEXTROSE 50 % IN WATER 50 %
25 SYRINGE (ML) INTRAVENOUS ONCE
Refills: 0 | Status: DISCONTINUED | OUTPATIENT
Start: 2024-04-12 | End: 2024-04-15

## 2024-04-12 RX ORDER — LOSARTAN POTASSIUM 100 MG/1
100 TABLET, FILM COATED ORAL DAILY
Refills: 0 | Status: DISCONTINUED | OUTPATIENT
Start: 2024-04-12 | End: 2024-04-15

## 2024-04-12 RX ORDER — ACETAMINOPHEN 500 MG
650 TABLET ORAL EVERY 6 HOURS
Refills: 0 | Status: DISCONTINUED | OUTPATIENT
Start: 2024-04-12 | End: 2024-04-15

## 2024-04-12 RX ORDER — LIDOCAINE 4 G/100G
1 CREAM TOPICAL ONCE
Refills: 0 | Status: COMPLETED | OUTPATIENT
Start: 2024-04-12 | End: 2024-04-12

## 2024-04-12 RX ORDER — LANOLIN ALCOHOL/MO/W.PET/CERES
3 CREAM (GRAM) TOPICAL AT BEDTIME
Refills: 0 | Status: DISCONTINUED | OUTPATIENT
Start: 2024-04-12 | End: 2024-04-15

## 2024-04-12 RX ORDER — VALSARTAN 80 MG/1
1 TABLET ORAL
Qty: 0 | Refills: 0 | DISCHARGE

## 2024-04-12 RX ORDER — GLUCAGON INJECTION, SOLUTION 0.5 MG/.1ML
1 INJECTION, SOLUTION SUBCUTANEOUS ONCE
Refills: 0 | Status: DISCONTINUED | OUTPATIENT
Start: 2024-04-12 | End: 2024-04-15

## 2024-04-12 RX ORDER — ROSUVASTATIN CALCIUM 5 MG/1
1 TABLET ORAL
Qty: 0 | Refills: 0 | DISCHARGE

## 2024-04-12 RX ORDER — LOSARTAN POTASSIUM 100 MG/1
1 TABLET, FILM COATED ORAL
Refills: 0 | DISCHARGE

## 2024-04-12 RX ORDER — ATORVASTATIN CALCIUM 80 MG/1
20 TABLET, FILM COATED ORAL AT BEDTIME
Refills: 0 | Status: DISCONTINUED | OUTPATIENT
Start: 2024-04-12 | End: 2024-04-15

## 2024-04-12 RX ORDER — ENOXAPARIN SODIUM 100 MG/ML
40 INJECTION SUBCUTANEOUS EVERY 24 HOURS
Refills: 0 | Status: DISCONTINUED | OUTPATIENT
Start: 2024-04-12 | End: 2024-04-15

## 2024-04-12 RX ORDER — SODIUM CHLORIDE 9 MG/ML
1000 INJECTION, SOLUTION INTRAVENOUS
Refills: 0 | Status: DISCONTINUED | OUTPATIENT
Start: 2024-04-12 | End: 2024-04-15

## 2024-04-12 RX ORDER — ASPIRIN/CALCIUM CARB/MAGNESIUM 324 MG
81 TABLET ORAL DAILY
Refills: 0 | Status: DISCONTINUED | OUTPATIENT
Start: 2024-04-12 | End: 2024-04-15

## 2024-04-12 RX ORDER — INFLUENZA VIRUS VACCINE 15; 15; 15; 15 UG/.5ML; UG/.5ML; UG/.5ML; UG/.5ML
0.7 SUSPENSION INTRAMUSCULAR ONCE
Refills: 0 | Status: DISCONTINUED | OUTPATIENT
Start: 2024-04-12 | End: 2024-04-15

## 2024-04-12 RX ORDER — DEXTROSE 50 % IN WATER 50 %
15 SYRINGE (ML) INTRAVENOUS ONCE
Refills: 0 | Status: DISCONTINUED | OUTPATIENT
Start: 2024-04-12 | End: 2024-04-15

## 2024-04-12 RX ORDER — SENNA PLUS 8.6 MG/1
2 TABLET ORAL AT BEDTIME
Refills: 0 | Status: DISCONTINUED | OUTPATIENT
Start: 2024-04-12 | End: 2024-04-15

## 2024-04-12 RX ORDER — SODIUM CHLORIDE 9 MG/ML
1000 INJECTION INTRAMUSCULAR; INTRAVENOUS; SUBCUTANEOUS
Refills: 0 | Status: DISCONTINUED | OUTPATIENT
Start: 2024-04-12 | End: 2024-04-15

## 2024-04-12 RX ORDER — METFORMIN HYDROCHLORIDE 850 MG/1
1 TABLET ORAL
Qty: 0 | Refills: 0 | DISCHARGE

## 2024-04-12 RX ORDER — DEXTROSE 10 % IN WATER 10 %
125 INTRAVENOUS SOLUTION INTRAVENOUS ONCE
Refills: 0 | Status: DISCONTINUED | OUTPATIENT
Start: 2024-04-12 | End: 2024-04-15

## 2024-04-12 RX ORDER — LINACLOTIDE 145 UG/1
1 CAPSULE, GELATIN COATED ORAL
Qty: 0 | Refills: 0 | DISCHARGE

## 2024-04-12 RX ORDER — ACETAMINOPHEN 500 MG
650 TABLET ORAL ONCE
Refills: 0 | Status: COMPLETED | OUTPATIENT
Start: 2024-04-12 | End: 2024-04-12

## 2024-04-12 RX ORDER — METFORMIN HYDROCHLORIDE 850 MG/1
1 TABLET ORAL
Refills: 0 | DISCHARGE

## 2024-04-12 RX ORDER — INSULIN GLARGINE 100 [IU]/ML
30 INJECTION, SOLUTION SUBCUTANEOUS
Qty: 0 | Refills: 0 | DISCHARGE

## 2024-04-12 RX ORDER — PANTOPRAZOLE SODIUM 20 MG/1
40 TABLET, DELAYED RELEASE ORAL
Refills: 0 | Status: DISCONTINUED | OUTPATIENT
Start: 2024-04-12 | End: 2024-04-15

## 2024-04-12 RX ORDER — INSULIN LISPRO 100/ML
VIAL (ML) SUBCUTANEOUS AT BEDTIME
Refills: 0 | Status: DISCONTINUED | OUTPATIENT
Start: 2024-04-12 | End: 2024-04-15

## 2024-04-12 RX ORDER — DEXTROSE 50 % IN WATER 50 %
12.5 SYRINGE (ML) INTRAVENOUS ONCE
Refills: 0 | Status: DISCONTINUED | OUTPATIENT
Start: 2024-04-12 | End: 2024-04-15

## 2024-04-12 RX ORDER — ATORVASTATIN CALCIUM 80 MG/1
1 TABLET, FILM COATED ORAL
Refills: 0 | DISCHARGE

## 2024-04-12 RX ADMIN — ENOXAPARIN SODIUM 40 MILLIGRAM(S): 100 INJECTION SUBCUTANEOUS at 21:27

## 2024-04-12 RX ADMIN — LOSARTAN POTASSIUM 100 MILLIGRAM(S): 100 TABLET, FILM COATED ORAL at 12:53

## 2024-04-12 RX ADMIN — SODIUM CHLORIDE 100 MILLILITER(S): 9 INJECTION INTRAMUSCULAR; INTRAVENOUS; SUBCUTANEOUS at 15:49

## 2024-04-12 RX ADMIN — Medication 81 MILLIGRAM(S): at 18:25

## 2024-04-12 RX ADMIN — ATORVASTATIN CALCIUM 20 MILLIGRAM(S): 80 TABLET, FILM COATED ORAL at 21:27

## 2024-04-12 RX ADMIN — LIDOCAINE 1 PATCH: 4 CREAM TOPICAL at 08:06

## 2024-04-12 RX ADMIN — LIDOCAINE 1 PATCH: 4 CREAM TOPICAL at 20:00

## 2024-04-12 RX ADMIN — Medication 650 MILLIGRAM(S): at 08:06

## 2024-04-12 RX ADMIN — Medication 650 MILLIGRAM(S): at 09:06

## 2024-04-12 RX ADMIN — Medication 650 MILLIGRAM(S): at 18:26

## 2024-04-12 NOTE — H&P ADULT - ASSESSMENT
79 years old female with h/o HTN, HLD, DM present to ED with frequent fall. Patient had 3 fall in last one week. Patient only remember first episode which happen after she was trying to go to bathroom, then she woke up on floor. Today, patient work up on floor, does not remember what happened and reported muscle cramp. Patient had old bruise on lateral chest from previous fall. No chest pain, SOB, palpitation, nauea, vomiting or diarrhea  Hypertensive, afebrile, sat well at RA. EKG with NSR, QTc 467. No leukocytosis,p lt 272, K 3.6, Cr 0.88. UA negative for UTI. CT head/C spine with no acute pathology or fracture. CT chest with no acute fracture.

## 2024-04-12 NOTE — ED PROVIDER NOTE - OBJECTIVE STATEMENT
79F PMH HTN, HLD, DM BIBEMS s/p unwitnessed fall at home this AM, pt CC is R sided body pain. Pt reports hx fall Thursday AM while walking from bed to bathroom, pt reports she was seen in hospital w/ negative w/u and d/c'd home w/ pain medications. Pt reports arrival home around 5P last night and states she went to bed late. Pt unsure how, but reports awakening on the ground this AM, unsure if + head strike or LOC. Pt states she lives at home w/ children and that they were able to help her up this AM. Pt endorses R sided chest / rib pain w/ bruising as well as BLE cramping. Denies h/a, dizziness, palpitations, SOB, cough, abd pain, back pain, N/V/D/C, UTI sx, LE swelling.     PMH as above, PSH non-contributory, NKDA, Meds as listed (not on AC).   Chart review shows pt seen in [facility] ED 4/4 s/p fall w/ negative w/u. 79F PMH HTN, HLD, DM BIBEMS s/p unwitnessed fall at home this AM, pt CC is R sided body pain. Pt reports hx fall Thursday AM while walking from bed to bathroom, pt reports she was seen in hospital w/ negative w/u and d/c'd home w/ pain medications. Pt reports arrival home around 5P last night and states she went to bed late. Pt unsure how, but reports awakening on the ground this AM, unsure if + head strike or LOC. Pt states she lives at home w/ children and that they were able to help her up this AM. Pt endorses R sided chest / rib pain w/ bruising as well as BLE cramping. Denies h/a, dizziness, palpitations, SOB, cough, abd pain, back pain, N/V/D/C, UTI sx, LE swelling. Pt reports ambulatory w/o device or assistance at baseline, states will use cane on occasion if her ankles are swollen.     PMH as above, PSH non-contributory, NKDA, Meds as listed (not on AC).   Chart review shows pt seen in [facility] ED 4/4 s/p fall w/ negative w/u.

## 2024-04-12 NOTE — ED ADULT NURSE NOTE - NSFALLRISKINTERV_ED_ALL_ED

## 2024-04-12 NOTE — H&P ADULT - NSHPPHYSICALEXAM_GEN_ALL_CORE
CONSTITUTIONAL: alert and cooperative, no acute distress.   EYES: PERRL, no scleral icterus  ENT: Mucosa moist, tongue normal  NECK: Neck supple, trachea midline, non-tender  CARDIAC: Normal S1 and S2. Regular rate and rhythms. No Pedal edema. Peripheral pulses intact  LUNGS: Equal air entry both lungs. No rales, rhonchi, wheezing. Normal respiratory effort.   ABDOMEN: Soft, nondistended, nontender. No guarding or rebound tenderness. No hepatomegaly or splenomegaly. Bowel sound normal.   MUSCULOSKELETAL: Normocephalic, atraumatic. Spine normal without deformity or tenderness. No significant deformity or joint abnormality  NEUROLOGICAL: No gross motor or sensory deficits  SKIN: Chest wall bruises noted. Normal turgor  PSYCHIATRIC: A&O x 3, appropriate mood and affect

## 2024-04-12 NOTE — ED ADULT TRIAGE NOTE - CHIEF COMPLAINT QUOTE
BIBA for s/p fall today. Found the ground by EMS, no head trauma or LOC. Complains of right side pain. Also fell 2 days ago and fell on the right side, went to ED and was diagnosed with muscular pain. PMH DM.

## 2024-04-12 NOTE — ED PROVIDER NOTE - CLINICAL SUMMARY MEDICAL DECISION MAKING FREE TEXT BOX
79F PMH HTN, HLD, DM BIBEMS d/t R sided body pain s/p unwitnessed fall this AM, pt w/ hx falls yesterday and 1wk ago. Afebrile, VSS. Pt overall well-appearing, in NAD. Exam as noted in PE. Plan for CBC, CMP, mag, trop, ECG, UA/C, CT brain / C-spine / chest, XR R shoulder and BL hips. Re-eval. Consider admission d/t hx frequent falls over past 2wks. 79F PMH HTN, HLD, DM BIBEMS d/t R sided body pain s/p unwitnessed fall this AM, pt w/ hx falls yesterday and 1wk ago. Afebrile, VSS. Pt overall well-appearing, in NAD. Exam as noted in PE. Plan for CBC, CMP, mag, trop, ECG, UA/C, CT brain / C-spine / chest, XR R shoulder and BL hips. Re-eval. Consider admission d/t hx frequent falls over past 2wks.  W/u w/o significant abnormalities. On re-eval, pt resting comfortably. Shared decision making w/ pt, sister: Results of ED w/u reviewed w/ pt, family. Offered admission d/t fall x3 w/in past 1wk, pt and family agreeable w/ this plan. Will admit to medicine (d/w Dr Ortiz).

## 2024-04-12 NOTE — ED PROVIDER NOTE - PHYSICAL EXAMINATION
GEN: Awake, alert, interactive, NAD.  HEAD AND NECK: NC/AT. Airway patent. Neck supple.   EYES:  Clear b/l. EOMI. PERRL.   ENT: Moist mucus membranes.   CARDIAC: Regular rate, regular rhythm. No evident pedal edema.    RESP/CHEST: Normal respiratory effort with no use of accessory muscles or retractions. Clear throughout on auscultation. + ecchymosis and TTP to R inferior breast / R anterior lateral ribcage.   ABD: Soft, non-distended, non-tender. No rebound, no guarding.   BACK: No midline spinal TTP. No CVAT.   EXTREMITIES: Moving all extremities with no apparent deformities.   SKIN: Warm, dry, intact normal color. No rash.   NEURO: AOx3, CN II-XII grossly intact, no focal deficits.   PSYCH: Appropriate mood and affect.

## 2024-04-12 NOTE — H&P ADULT - PROBLEM SELECTOR PLAN 2
CT head/C spine  ( I personally review) with no acute pathology or fracture. CT chest with no acute fracture  PT consult  Syncope work up

## 2024-04-12 NOTE — H&P ADULT - PROBLEM SELECTOR PLAN 1
present with fall x 3 in last 7 days, only remember one episode when she tried to go to bathroom and woke up on floor  CT head/C spine  ( I personally review) with no acute pathology or fracture. CT chest with no acute fracture.  EKG  ( I personally review) with NSR  Clinical presentation concerning for syncope, unclear etiology  check orthostatic vital  Check EEG, carotid doppler, ECHO  Telemetry monitor to R/O arrhythmia  PT consult  Trial of gentle IV fluid

## 2024-04-12 NOTE — H&P ADULT - HISTORY OF PRESENT ILLNESS
79 years old female with h/o HTN, HLD, DM present to ED with frequent fall. Patient had 3 fall in last one week. Patient only remember first episode which happen after she was trying to go to bathroom, then she woke up on floor. Today, patient work up on floor, does not remember what happened and reported muscle cramp. Patient had old bruise on lateral chest from previous fall. No chest pain, SOB, palpitation, nauea, vomiting or diarrhea  Hypertensive, afebrile, sat well at RA. EKG with NSR, QTc 467. No leukocytosis,p lt 272, K 3.6, Cr 0.88. UA negative for UTI. CT head/C spine with no acute pathology or fracture. CT chest with no acute fracture.    SH: no toxic habits  FH: HTN, DM

## 2024-04-12 NOTE — PATIENT PROFILE ADULT - FALL HARM RISK - RISK INTERVENTIONS

## 2024-04-12 NOTE — ED ADULT NURSE NOTE - OBJECTIVE STATEMENT
79 yr old female AOx4. C/o fall this morning. Reports multiple falls recently. Mechanical falls, no head trauma or LOC. Reports 10/10 R rib and R leg pain. Generalized R sided CP. PMH of DM, HTN, HLD, and anemia. Pt denies CP, SOB, N/V/D, fever/chills, h/a, dizziness. Pt reports going to ED yesterday for another fall. Residual bruising on R breast.

## 2024-04-12 NOTE — ED ADULT TRIAGE NOTE - ARRIVAL FROM
Speech IRP Treatment                             .                         Primary Rehabilitation Diagnosis: SDH with craniotomy, BI  Expected Discharge Date: 01/19/18  Planned Discharge Destination: Home      RECOMMENDATIONS:  1.  Initiate puree diet, honey thick liquids, meds crushed  2.  Constant supervision with intake  3.  Ensure pt has swallowed prior to next bite/drink, pt has been noted to orally hold  4.  Speech to follow for upgrade trials as approp       OBJECTIVE:  See below for current functional status overview.  See Speech flowsheets for full details regarding the speech therapy provided.    ASSESSMENT:  Patient was seen on IRP for swallow treatment.   pt seen bedside for scheduled session, pt awake, alert, cooperative, no c/o pain.   Pt seen with am meal. Pt self feeding this meal with approp rate, approp bite size and with good clearance.  Pt with reduced impulsivity with intake this date.  Pt continues with minimal oral mastication/manipulation of bolus however pt continues to be safe with this texture.  Pt with good oral control of honey thick liquids.  Swallow delayed across consistencies however pt with absent overt signs of aspiration with intake.  Temps/lungs stable.     Second session:  pt seen bedside for scheduled session, pt awake, alert, cooperative, no c/o pain.  Pt wanted to call daughter while eating meal, SLP discussed with pt that we would call daughter after eating meal and finishing therapy.  Following meal, pt independently picked up the phone, dialed  and asked the  to assist her with making a call to [facility].  Pt gave the  the phone number that she knew prompting herself with recalling the number by pretending to \"air dial\" the number with a pretend key pad; when  said it didn't go through, pt said to try it with the 920 area code. Pt then was able to reach her daughter and engaged in mostly approp conversational level verbal exchanges  with approx 25-30% confabulation at conversational level.      Continued skilled service reasonable and necessary for  ongoing assessment of PO tolerance, readiness for upgrades, advanced diagnostics as approp, ongoing assessment of comm/cog status for goal modification and ongoing pt/family ed.      PLAN:   Plan for Next Session: swallow: assess tolerance of puree, honey thick liquids, meds crushed, watch for swallow, pt holds PO at times, previous diet puree, nectar prior to seizure, previor to that, diet mech soft, thin with diet upgrade trials as approp when alertness and mastication of solids improves;  previous goal prior to STAT call was to assess tolerance of general diet/thin liquids, tolerating easy chew/thin with stable lungs/temps and throat clearing post intake.  pt with inconsistent throat clearing at times. video if questioning tolerance.   upgrade solids as appropriate. comm/cog: complete SCCAN, severely impaired attention    EDUCATION:   On this date, the patient educated on importance of continued slow rate and small bite size.    The response to education: Needs reinforcement    RECOMMENDATIONS FOR DISCHARGE:  SLP Identified Barriers to Discharge: dysphagia, comm/cog (01/10/18 0800)  Recommendations for Discharge: SLP: post IRP therapy likely indicated (01/10/18 0800)          Swallow Treatment Data Overview Last 24 hours     Subjective  Subjective Comments: pt seen bedside for scheduled session, pt awake, having some hallucinations today, seeing bed bugs in room, no c/o or indications of pain, feeding self with reduced impulsivity (01/10/18 0800)         Therapeutic Feeding  Liquids: Thin liquids;Nectar thick liquids;Honey thick liquids (01/09/18 0900)  Solids: Dysphagia 1/pureed (01/09/18 0900)      Thin Liquids  Quantity: 2 drinks (01/09/18 0900)  Presentation: Cup (01/09/18 0900)  Oral Phase: Impaired bolus control;Premature spillage suspected;Slow oral transit time (01/09/18 0900)  Pharyngeal  Phase: Delayed swallow;Multiple swallows noted;Throat clearing - immediate;Cough - immediate;Cough - delayed (01/09/18 0900)  Comments: poor oral control, overt and delayed coughing,  (01/09/18 0900)      Nectar Thick Liquids  Quantity: 2 oz (01/09/18 0900)  Presentation: Cup;Straw;Tsp (01/09/18 0900)  Oral Phase: Impaired bolus control;Premature spillage suspected;Slow oral transit time (01/09/18 0900)  Pharyngeal Phase: Delayed swallow;Multiple swallows noted;Throat clearing - delayed (01/09/18 0900)  Comments: reduced oral control, delayed swallow, delayed throat clear (01/09/18 0900)      Honey Thick Liquids  Quantity: 10 oz coffee with breakfast (01/10/18 0800)  Presentation: Cup (01/10/18 0800)  Oral Phase: Slow oral transit time;Premature spillage suspected (01/10/18 0800)  Pharyngeal Phase: Delayed swallow (01/10/18 0800)  Comments: absent overt signs of aspiration with intake (01/10/18 0800)         Dysphagia 1/Pureed  Quantity: 6 oz oatmeal, 3 oz applesauce, 2 oz pudding (01/10/18 0800)  Percent of Meal: 100% (01/09/18 0900)  Assist with Feeding: Independent (01/10/18 0800)  Oral Phase: Slow oral transit time (01/10/18 0800)  Pharyngeal Phase: Delayed swallow (01/10/18 0800)  Comments: limited oral manipulation, swallows with limited to no manipulation, delayed swallow, absent overt signs of aspiration with intake (01/10/18 0800)          Esophageal Phase  Esophageal Phase Comments: unremarkable (01/10/18 0800)         Communication/Cognition Data Overview Last 24 hours       Subjective  Subjective Comments: pt seen bedside for scheduled session, pt awake, alert, very talkative today, expression approp to situation however having some hallucinations today about bed bug in the room (01/10/18 0830)    Observation  Observation Comments: pts daughter present for session (01/09/18 1330)         Reading Comprehension  Scanning/Tracking: Mild impairment (01/09/18 1100)  Words: Mild impairment (01/09/18  1100)  Functional Reading: Severe impairment (01/09/18 1100)  Reading Comprehension Comments: SCCAN - 50% (01/09/18 1100)         Problem Solving  Simple Problem Solving: Mild impairment (01/10/18 0830)  Simple Problem Solving Comments: pt wanted to call daughter, SLP informed pt that we would call daughter after therapy and breakfast.  while eating meal, pt picked up the phone, called , asked  to make a call to [facility], gave  the number, couldnt remember right away but pretended to call using \"air phone\" and verbalized numbers to  who called daughter for her.  (01/10/18 0830)             All Speech Therapy Goals:    SLP Goals  Short Term Goals to be Reviewed on : 01/16/18 (01/09/18 0900)  STG are the Same as Discharge Goals: No (01/09/18 0900)  Goal Agreement: Patient agrees with goals and treatment plan (01/09/18 0900)      Attention Short Term Goal 1  Attention STG 1: pt will attend to 30 minute session with less than 5 redirects (01/05/18 1200)  Attention Discharge Goal 1: comm/cog skills with min assist (01/05/18 1200)  Attention Discharge Goal 1 Progress: Outcome not met, continue to monitor (01/05/18 1200)      Memory Short Term Goal 1  Memory STG 1: Pt will complete basic memory task of recalling 3 items after 1, 3, 5 minute intervals with min assist  (01/05/18 1200)  Memory Discharge Goal 1: Improve to superviison (01/05/18 1200)  Memory Discharge Goal 1 Progress: Outcome not met, continue to monitor (01/05/18 1200)    Memory STG 1: Pt will complete basic memory task of recalling 3 items after 1, 3, 5 minute intervals with min assist  (01/05/18 1200)      Problem Solving Short Term Goal 1  Problem Solving STG 1: pt will perform functional problem solving/sequencing tasks at 80% and min to mod cues (01/05/18 1200)  Problem Solving Discharge Goal 1: comm/cog skills with min assist (01/05/18 1200)  Problem Solving Discharge Goal 1 Progress: Outcome not met, continue to  monitor (01/05/18 1200)          Swallowing Short Term Goal 1  Swallowing STG 1: pt will tolerate diet of mechanical soft solids and thin liquids with adequate mastication/oral clearance, less than 10% SS of aspiration and stable temps/lungs (01/05/18 1200)  Swallowing Discharge Goal 1: pt will tolerate least restrictive diet (01/05/18 1200)  Swallowing Discharge Goal 1 Progress: Outcome met, complete goal (01/05/18 1200)      Swallowing Short Term Goal 2  Swallowing STG 2: GOAL MODIFIED 1/8/18/:  tolerate mech soft consistency solids and thin liquids with adequate mastication/oral clearance, less than 10% SS of aspiration and stable temps/lungs (01/08/18 1215)  Swallowing Discharge Goal 2: pt will tolerate least restrictive diet (01/08/18 1215)  Swallowing Discharge Goal 2 Progress: Outcome not met, continue to monitor (01/05/18 1200)      Swallowing Short Term Goal 3  Swallowing STG 3: GOAL MODIFIED 1/9/18 pt will tolerate diet of puree and honey thick liquids with adequate mastication/oral clearance, less than 10% SS of aspiration and stable temps/lungs (01/09/18 0900)  Swallowing Discharge Goal 3: pt will tolerate least restrictive diet (01/09/18 0900)  Swallowing Discharge Goal 3 Progress: Outcome not met, continue to monitor (01/05/18 1200)     Home

## 2024-04-13 LAB
A1C WITH ESTIMATED AVERAGE GLUCOSE RESULT: 7.6 % — HIGH (ref 4–5.6)
ALBUMIN SERPL ELPH-MCNC: 3.2 G/DL — LOW (ref 3.3–5)
ALP SERPL-CCNC: 133 U/L — HIGH (ref 40–120)
ALT FLD-CCNC: 22 U/L — SIGNIFICANT CHANGE UP (ref 12–78)
ANION GAP SERPL CALC-SCNC: 8 MMOL/L — SIGNIFICANT CHANGE UP (ref 5–17)
AST SERPL-CCNC: 32 U/L — SIGNIFICANT CHANGE UP (ref 15–37)
BILIRUB SERPL-MCNC: 0.6 MG/DL — SIGNIFICANT CHANGE UP (ref 0.2–1.2)
BUN SERPL-MCNC: 9 MG/DL — SIGNIFICANT CHANGE UP (ref 7–23)
CALCIUM SERPL-MCNC: 8.7 MG/DL — SIGNIFICANT CHANGE UP (ref 8.5–10.1)
CHLORIDE SERPL-SCNC: 109 MMOL/L — HIGH (ref 96–108)
CHOLEST SERPL-MCNC: 126 MG/DL — SIGNIFICANT CHANGE UP
CK SERPL-CCNC: 288 U/L — HIGH (ref 26–192)
CO2 SERPL-SCNC: 24 MMOL/L — SIGNIFICANT CHANGE UP (ref 22–31)
CREAT SERPL-MCNC: 0.86 MG/DL — SIGNIFICANT CHANGE UP (ref 0.5–1.3)
CULTURE RESULTS: SIGNIFICANT CHANGE UP
EGFR: 69 ML/MIN/1.73M2 — SIGNIFICANT CHANGE UP
ESTIMATED AVERAGE GLUCOSE: 171 MG/DL — HIGH (ref 68–114)
GLUCOSE BLDC GLUCOMTR-MCNC: 165 MG/DL — HIGH (ref 70–99)
GLUCOSE BLDC GLUCOMTR-MCNC: 211 MG/DL — HIGH (ref 70–99)
GLUCOSE BLDC GLUCOMTR-MCNC: 213 MG/DL — HIGH (ref 70–99)
GLUCOSE SERPL-MCNC: 156 MG/DL — HIGH (ref 70–99)
HCT VFR BLD CALC: 34 % — LOW (ref 34.5–45)
HDLC SERPL-MCNC: 38 MG/DL — LOW
HGB BLD-MCNC: 10.7 G/DL — LOW (ref 11.5–15.5)
LIPID PNL WITH DIRECT LDL SERPL: 71 MG/DL — SIGNIFICANT CHANGE UP
MAGNESIUM SERPL-MCNC: 1.8 MG/DL — SIGNIFICANT CHANGE UP (ref 1.6–2.6)
MCHC RBC-ENTMCNC: 23.6 PG — LOW (ref 27–34)
MCHC RBC-ENTMCNC: 31.5 G/DL — LOW (ref 32–36)
MCV RBC AUTO: 75.1 FL — LOW (ref 80–100)
NON HDL CHOLESTEROL: 88 MG/DL — SIGNIFICANT CHANGE UP
NRBC # BLD: 0 /100 WBCS — SIGNIFICANT CHANGE UP (ref 0–0)
PHOSPHATE SERPL-MCNC: 2.9 MG/DL — SIGNIFICANT CHANGE UP (ref 2.5–4.5)
PLATELET # BLD AUTO: 236 K/UL — SIGNIFICANT CHANGE UP (ref 150–400)
POTASSIUM SERPL-MCNC: 3.1 MMOL/L — LOW (ref 3.5–5.3)
POTASSIUM SERPL-SCNC: 3.1 MMOL/L — LOW (ref 3.5–5.3)
PROT SERPL-MCNC: 7.5 GM/DL — SIGNIFICANT CHANGE UP (ref 6–8.3)
RBC # BLD: 4.53 M/UL — SIGNIFICANT CHANGE UP (ref 3.8–5.2)
RBC # FLD: 14.9 % — HIGH (ref 10.3–14.5)
SODIUM SERPL-SCNC: 141 MMOL/L — SIGNIFICANT CHANGE UP (ref 135–145)
SPECIMEN SOURCE: SIGNIFICANT CHANGE UP
TRIGL SERPL-MCNC: 86 MG/DL — SIGNIFICANT CHANGE UP
WBC # BLD: 3.8 K/UL — SIGNIFICANT CHANGE UP (ref 3.8–10.5)
WBC # FLD AUTO: 3.8 K/UL — SIGNIFICANT CHANGE UP (ref 3.8–10.5)

## 2024-04-13 PROCEDURE — 99232 SBSQ HOSP IP/OBS MODERATE 35: CPT

## 2024-04-13 PROCEDURE — 70551 MRI BRAIN STEM W/O DYE: CPT | Mod: 26

## 2024-04-13 PROCEDURE — 95816 EEG AWAKE AND DROWSY: CPT | Mod: 26

## 2024-04-13 RX ORDER — POTASSIUM CHLORIDE 20 MEQ
40 PACKET (EA) ORAL EVERY 4 HOURS
Refills: 0 | Status: COMPLETED | OUTPATIENT
Start: 2024-04-13 | End: 2024-04-13

## 2024-04-13 RX ADMIN — Medication 2: at 16:32

## 2024-04-13 RX ADMIN — ENOXAPARIN SODIUM 40 MILLIGRAM(S): 100 INJECTION SUBCUTANEOUS at 21:37

## 2024-04-13 RX ADMIN — PANTOPRAZOLE SODIUM 40 MILLIGRAM(S): 20 TABLET, DELAYED RELEASE ORAL at 05:22

## 2024-04-13 RX ADMIN — Medication 40 MILLIEQUIVALENT(S): at 15:39

## 2024-04-13 RX ADMIN — Medication 81 MILLIGRAM(S): at 14:06

## 2024-04-13 RX ADMIN — Medication 40 MILLIEQUIVALENT(S): at 10:09

## 2024-04-13 RX ADMIN — SODIUM CHLORIDE 100 MILLILITER(S): 9 INJECTION INTRAMUSCULAR; INTRAVENOUS; SUBCUTANEOUS at 05:23

## 2024-04-13 RX ADMIN — Medication 650 MILLIGRAM(S): at 05:21

## 2024-04-13 RX ADMIN — ATORVASTATIN CALCIUM 20 MILLIGRAM(S): 80 TABLET, FILM COATED ORAL at 21:37

## 2024-04-13 RX ADMIN — Medication 1: at 07:54

## 2024-04-13 RX ADMIN — LOSARTAN POTASSIUM 100 MILLIGRAM(S): 100 TABLET, FILM COATED ORAL at 05:23

## 2024-04-13 NOTE — PHYSICAL THERAPY INITIAL EVALUATION ADULT - RANGE OF MOTION EXAMINATION, REHAB EVAL
RUE decreased ROM due to pain./Left UE ROM was WFL (within functional limits)/bilateral lower extremity ROM was WFL (within functional limits)/deficits as listed below

## 2024-04-13 NOTE — PROGRESS NOTE ADULT - PROBLEM SELECTOR PLAN 5
[address]  Consult Note/H&P/Progress Note      Juan Carlos Bateman  MEDICAL RECORD NUMBER:  [id number]  AGE: 81 y.o. RACE White (non-)  GENDER: female  : 1938  EPISODE DATE:  2023       Subjective:      CC (Chief Complaint) and HISTORY of PRESENT ILLNESS (HPI):    Juan Carlos Bateman is a 81 y.o. female who presents today for wound/ulcer evaluation. She reported a traumatic injury of her right lower extremity when a coat-tree fell on her leg in the garage on 3/13/23    She was on Eliquis at the time and had bleeding with had to be seen in the emergency room. Her hemoglobin dropped 2 points but she did not require transfusion. She had local wound care and subsequently developed infection treated with doxycycline and later clindamycin. She came to the wound center for first visit on 23. She denies history of diabetes or tobacco use. She had easily palpable pedal pulses bilaterally. 3/19/23 Xray right tib/fib  Hx: Laceration and anterior aspect in the lower right leg     FINDINGS: Osteoarthritis is present in the knee and ankle. There is no displaced fracture or dislocation. Soft tissues are normal.     Impression:  No displaced fracture. PAST MEDICAL HISTORY  Past Medical History:   Diagnosis Date    Acute renal failure (ARF) (HCC)     Arrhythmia     Arthritis          Chronic pain     arthritis in knees    Coronary artery disease of native artery of native heart with stable angina pectoris ([facility] [address] 2013    prior thrombotic occlusion of the LAD. No obstructive disease at cath  2002 Aug 2013: Cath no obstructive CAD 2016 - EF 60-65%.  pseudonormal diastolic function, RVSP  - Lexiscan MPI - normal EKG, perfusion and LVEF Oct 2021- Echo - EF 50-55%, mild to mod MR, bi atrial enlargement, mild  RVE, RVSP 44 Aug 2022 - normal perfusion, EF 66%     Dyslipidemia     GERD
ISS, hypoglycemia protocol, A1c

## 2024-04-13 NOTE — PHYSICAL THERAPY INITIAL EVALUATION ADULT - ADDITIONAL COMMENTS
Pt currently lives in [employer] [place] [facility] [place] w/ 3 kids. 4 ERROL (R railing) and no steps inside. Pt owns and uses no DME. Has had 3 falls within past week; currently experiencing 9/10 R shoulder pain and bruising due to fall on shoulder. Family is around to help if need be.

## 2024-04-13 NOTE — PHYSICAL THERAPY INITIAL EVALUATION ADULT - DIAGNOSIS, PT EVAL
[Dear  ___] : Dear  [unfilled], [Consult Letter:] : I had the pleasure of evaluating your patient, [unfilled]. [( Thank you for referring [unfilled] for consultation for _____ )] : Thank you for referring [unfilled] for consultation for [unfilled] [Please see my note below.] : Please see my note below. [Consult Closing:] : Thank you very much for allowing me to participate in the care of this patient.  If you have any questions, please do not hesitate to contact me. [Sincerely,] : Sincerely, [FreeTextEntry1] : 73 yr old male presents to establish care. Pt PSA done in 2020- PSA 2. Pt was having GERD and went to see cardiologist DR. Ortiz- repeat PSA 5.0. Nocturia x 2-3. Pt notes had ejaculated twice the night before the most recent psa last month; ? related. No current ED or sig voiding issues; on flomax and comfortable.\par \par Surgical hx: inguinal hernia repair 2018, appendectomy at age 13, SBO resection 41 yrs. \par Medical hx: GELY, BPH, COPD- , celiac \par Allergies: NKDA\par Social: Alcohol- occasionally, Smoking- quit 30 yrs ago, 0.5 ppd x 10 years, Drug-none, Occupation- retired- bank \par Family hx: father- lung ca-  42, mother- breast and colon ca\par Medications: Tamsulosin, atorvastatin 10 mg, Famotidine 40 mg, esomeprazole 40 mg, Breo\par \par PE wnl today, mildly enlarged prostate without nodules or induration.\par \par PSA aberration likely related to sexual activity- psa changes over the course of several months less likely to be cancer-related.  Now that there is ~ 1 week since last reported sexual activity, will check PSA today\par \par 1. u/a today\par 2. psa today\par will review by phone, determine next f/u difficulty walking, poor balance decreased ROM, decreased strength

## 2024-04-13 NOTE — PHYSICAL THERAPY INITIAL EVALUATION ADULT - IMPAIRMENTS FOUND, PT EVAL
aerobic capacity/endurance/decreased midline orientation/gait, locomotion, and balance/gross motor/joint integrity and mobility/muscle strength/posture/ROM

## 2024-04-13 NOTE — PHYSICAL THERAPY INITIAL EVALUATION ADULT - GENERAL OBSERVATIONS, REHAB EVAL
Pt encountered walking with CNA to bathroom, family at bedside. NAD, AxOx4; reports 9/10 pain in R shoulder.
none

## 2024-04-13 NOTE — PHYSICAL THERAPY INITIAL EVALUATION ADULT - PERTINENT HX OF CURRENT PROBLEM, REHAB EVAL
Per progress note 4/12/24: 79 years old female with h/o HTN, HLD, DM present to ED with frequent fall. Patient had 3 fall in last one week. Patient only remember first episode which happen after she was trying to go to bathroom, then she woke up on floor. Today, patient work up on floor, does not remember what happened and reported muscle cramp. Patient had old bruise on lateral chest from previous fall. No chest pain, SOB, palpitation, nauea, vomiting or diarrhea  Hypertensive, afebrile, sat well at RA. EKG with NSR, QTc 467. No leukocytosis,p lt 272, K 3.6, Cr 0.88. UA negative for UTI. CT head/C spine with no acute pathology or fracture. CT chest with no acute fracture.

## 2024-04-13 NOTE — EEG REPORT - NS EEG TEXT BOX
JEFFERY BATES [id number]     Study Date: 		04-13-24  Duration: 20 min  --------------------------------------------------------------------------------------------------  History:  CC/ HPI Patient is a 79y old  Female who presents with a chief complaint of syncope/frequent fall (13 Apr 2024 14:47)    MEDICATIONS  (STANDING):  No ASM    --------------------------------------------------------------------------------------------------  Study Interpretation:    [[[Abbreviation Key:  PDR=alpha rhythm/posterior dominant rhythm. A-P=anterior posterior.  Amplitude: ‘very low’:<20; ‘low’:20-49; ‘medium’:; ‘high’:>150uV.  Persistence for periodic/rhythmic patterns (% of epoch) ‘rare’:<1%; ‘occasional’:1-10%; ‘frequent’:10-50%; ‘abundant’:50-90%; ‘continuous’:>90%.  Persistence for sporadic discharges: ‘rare’:<1/hr; ‘occasional’:1/min-1/hr; ‘frequent’:>1/min; ‘abundant’:>1/10 sec.  RPP=rhythmic and periodic patterns; GRDA=generalized rhythmic delta activity; FIRDA=frontal intermittent GRDA; LRDA=lateralized rhythmic delta activity; TIRDA=temporal intermittent rhythmic delta activity;  LPD=PLED=lateralized periodic discharges; GPD=generalized periodic discharges; BIPDs =bilateral independent periodic discharges; Mf=multifocal; SIRPDs=stimulus induced rhythmic, periodic, or ictal appearing discharges; BIRDs=brief potentially ictal rhythmic discharges >4 Hz, lasting .5-10s; PFA (paroxysmal bursts >13 Hz or =8 Hz <10s).  Modifiers: +F=with fast component; +S=with spike component; +R=with rhythmic component.  S-B=burst suppression pattern.  Max=maximal. N1-drowsy; N2-stage II sleep; N3-slow wave sleep. SSS/BETS=small sharp spikes/benign epileptiform transients of sleep. HV=hyperventilation; PS=photic stimulation]]]    Daily EEG Visual Analysis    FINDINGS:      Background:  Continuity: continuous  Symmetry: symmetric  PDR: 8 Hz activity, with amplitude to 40 uV, that attenuated to eye opening.  Low amplitude frontal beta noted in wakefulness.  Reactivity: present  Voltage: normal, mostly 20-150uV  Anterior Posterior Gradient: present  Other background findings: none  Breach: absent    Background Slowing:  Generalized slowing: none was present.  Focal slowing: none was present.    State Changes:   -Drowsiness noted with increased slowing, attenuation of fast activity, vertex transients.  -Present with N2 sleep transients with symmetric spindles and K-complexes.    Sporadic Epileptiform Discharges:    None    Rhythmic and Periodic Patterns (RPPs):  None     Electrographic and Electroclinical seizures:  None    Other Clinical Events:  None    Activation Procedures:   -Hyperventilation was not performed.    -Photic stimulation was performed and did not elicit any abnormalities.      Artifacts:  Intermittent myogenic and movement artifacts were noted.    ECG:  The heart rate on single channel ECG was predominantly between 60-70 BPM.    EEG Classification / Summary:  Normal   EEG in the awake / drowsy / asleep state(s).    Clinical Impression:  There were no epileptiform abnormalities recorded.      This is a preliminary fellow impression only pending attending review    Ricardo Berumen MD - Epilepsy Fellow JEFFERY BATES [id number]     Study Date: 		04-13-24  Duration: 20 min  --------------------------------------------------------------------------------------------------  History:  CC/ HPI Patient is a 79y old  Female who presents with a chief complaint of syncope/frequent fall (13 Apr 2024 14:47)    No ASM    --------------------------------------------------------------------------------------------------  Study Interpretation:    [[[Abbreviation Key:  PDR=alpha rhythm/posterior dominant rhythm. A-P=anterior posterior.  Amplitude: ‘very low’:<20; ‘low’:20-49; ‘medium’:; ‘high’:>150uV.  Persistence for periodic/rhythmic patterns (% of epoch) ‘rare’:<1%; ‘occasional’:1-10%; ‘frequent’:10-50%; ‘abundant’:50-90%; ‘continuous’:>90%.  Persistence for sporadic discharges: ‘rare’:<1/hr; ‘occasional’:1/min-1/hr; ‘frequent’:>1/min; ‘abundant’:>1/10 sec.  RPP=rhythmic and periodic patterns; GRDA=generalized rhythmic delta activity; FIRDA=frontal intermittent GRDA; LRDA=lateralized rhythmic delta activity; TIRDA=temporal intermittent rhythmic delta activity;  LPD=PLED=lateralized periodic discharges; GPD=generalized periodic discharges; BIPDs =bilateral independent periodic discharges; Mf=multifocal; SIRPDs=stimulus induced rhythmic, periodic, or ictal appearing discharges; BIRDs=brief potentially ictal rhythmic discharges >4 Hz, lasting .5-10s; PFA (paroxysmal bursts >13 Hz or =8 Hz <10s).  Modifiers: +F=with fast component; +S=with spike component; +R=with rhythmic component.  S-B=burst suppression pattern.  Max=maximal. N1-drowsy; N2-stage II sleep; N3-slow wave sleep. SSS/BETS=small sharp spikes/benign epileptiform transients of sleep. HV=hyperventilation; PS=photic stimulation]]]    Daily EEG Visual Analysis    FINDINGS:      Background:  Continuity: continuous  Symmetry: symmetric  PDR: 8 Hz activity, with amplitude to 40 uV, that attenuated to eye opening.  Low amplitude frontal beta noted in wakefulness.  Reactivity: present  Voltage: normal, mostly 20-150uV  Anterior Posterior Gradient: present  Other background findings: none  Breach: absent    Background Slowing:  Generalized slowing: none was present.  Focal slowing: none was present.    State Changes:   -Drowsiness noted with increased slowing, attenuation of fast activity, vertex transients.  -Present with N2 sleep transients with symmetric spindles and K-complexes.    Sporadic Epileptiform Discharges:    None    Rhythmic and Periodic Patterns (RPPs):  None     Electrographic and Electroclinical seizures:  None    Other Clinical Events:  None    Activation Procedures:   -Hyperventilation was not performed.    -Photic stimulation was performed and did not elicit any abnormalities.      Artifacts:  Intermittent myogenic and movement artifacts were noted.    ECG:  The heart rate on single channel ECG was predominantly between 60-70 BPM.    EEG Classification / Summary:  Normal   EEG in the awake / drowsy / asleep state(s).    Clinical Impression:  There were no epileptiform abnormalities recorded.          Ricardo Berumen MD - Epilepsy Fellow

## 2024-04-14 LAB
ANION GAP SERPL CALC-SCNC: 7 MMOL/L — SIGNIFICANT CHANGE UP (ref 5–17)
BUN SERPL-MCNC: 10 MG/DL — SIGNIFICANT CHANGE UP (ref 7–23)
CALCIUM SERPL-MCNC: 9.1 MG/DL — SIGNIFICANT CHANGE UP (ref 8.5–10.1)
CHLORIDE SERPL-SCNC: 112 MMOL/L — HIGH (ref 96–108)
CO2 SERPL-SCNC: 23 MMOL/L — SIGNIFICANT CHANGE UP (ref 22–31)
CREAT SERPL-MCNC: 1.01 MG/DL — SIGNIFICANT CHANGE UP (ref 0.5–1.3)
EGFR: 57 ML/MIN/1.73M2 — LOW
GLUCOSE BLDC GLUCOMTR-MCNC: 152 MG/DL — HIGH (ref 70–99)
GLUCOSE BLDC GLUCOMTR-MCNC: 175 MG/DL — HIGH (ref 70–99)
GLUCOSE BLDC GLUCOMTR-MCNC: 236 MG/DL — HIGH (ref 70–99)
GLUCOSE BLDC GLUCOMTR-MCNC: 279 MG/DL — HIGH (ref 70–99)
GLUCOSE SERPL-MCNC: 155 MG/DL — HIGH (ref 70–99)
POTASSIUM SERPL-MCNC: 3.6 MMOL/L — SIGNIFICANT CHANGE UP (ref 3.5–5.3)
POTASSIUM SERPL-SCNC: 3.6 MMOL/L — SIGNIFICANT CHANGE UP (ref 3.5–5.3)
SODIUM SERPL-SCNC: 142 MMOL/L — SIGNIFICANT CHANGE UP (ref 135–145)

## 2024-04-14 PROCEDURE — 99232 SBSQ HOSP IP/OBS MODERATE 35: CPT

## 2024-04-14 RX ADMIN — Medication 1: at 17:07

## 2024-04-14 RX ADMIN — PANTOPRAZOLE SODIUM 40 MILLIGRAM(S): 20 TABLET, DELAYED RELEASE ORAL at 05:15

## 2024-04-14 RX ADMIN — Medication 375 MILLIGRAM(S): at 12:45

## 2024-04-14 RX ADMIN — Medication 1: at 08:18

## 2024-04-14 RX ADMIN — ENOXAPARIN SODIUM 40 MILLIGRAM(S): 100 INJECTION SUBCUTANEOUS at 21:41

## 2024-04-14 RX ADMIN — ATORVASTATIN CALCIUM 20 MILLIGRAM(S): 80 TABLET, FILM COATED ORAL at 21:41

## 2024-04-14 RX ADMIN — Medication 81 MILLIGRAM(S): at 11:09

## 2024-04-14 RX ADMIN — Medication 375 MILLIGRAM(S): at 13:42

## 2024-04-14 RX ADMIN — LOSARTAN POTASSIUM 100 MILLIGRAM(S): 100 TABLET, FILM COATED ORAL at 05:15

## 2024-04-14 RX ADMIN — Medication 3: at 11:10

## 2024-04-14 NOTE — PROGRESS NOTE ADULT - PROBLEM SELECTOR PROBLEM 5
Type 2 diabetes mellitus with unspecified complications
Type 2 diabetes mellitus with unspecified complications

## 2024-04-14 NOTE — PROGRESS NOTE ADULT - PROBLEM SELECTOR PLAN 1
present with fall x 3 in last 7 days, only remember one episode when she tried to go to bathroom and woke up on floor  CT head/C spine  ( I personally review) with no acute pathology or fracture. CT chest with no acute fracture.  EKG  ( I personally review) with NSR  Clinical presentation concerning for syncope, unclear etiology  check orthostatic vital  Check EEG,   carotid doppler neg   , ECHO read pending   no events on Telemetry   mri shows no CVA   PT consult  Trial of gentle IV fluid  suspect mechanical falls with possible orthostasis. pt also with short term memory loss as per family
present with fall x 3 in last 7 days, only remember one episode when she tried to go to bathroom and woke up on floor  CT head/C spine  ( I personally review) with no acute pathology or fracture. CT chest with no acute fracture.  EKG  ( I personally review) with NSR  Clinical presentation concerning for syncope, unclear etiology  orthostatic vital normal   EEG, neg   carotid doppler neg   , ECHO read pending   no events on Telemetry   mri shows no CVA   PT consult rec home pt   Trial of gentle IV fluid  suspect mechanical falls with possible orthostasis. pt also with short term memory loss as per family

## 2024-04-15 ENCOUNTER — TRANSCRIPTION ENCOUNTER (OUTPATIENT)
Age: 79
End: 2024-04-15

## 2024-04-15 VITALS
HEART RATE: 78 BPM | RESPIRATION RATE: 18 BRPM | OXYGEN SATURATION: 100 % | SYSTOLIC BLOOD PRESSURE: 152 MMHG | DIASTOLIC BLOOD PRESSURE: 78 MMHG

## 2024-04-15 LAB
GLUCOSE BLDC GLUCOMTR-MCNC: 181 MG/DL — HIGH (ref 70–99)
GLUCOSE BLDC GLUCOMTR-MCNC: 218 MG/DL — HIGH (ref 70–99)
GLUCOSE BLDC GLUCOMTR-MCNC: 238 MG/DL — HIGH (ref 70–99)

## 2024-04-15 PROCEDURE — 99238 HOSP IP/OBS DSCHRG MGMT 30/<: CPT

## 2024-04-15 RX ORDER — AMLODIPINE BESYLATE 2.5 MG/1
1 TABLET ORAL
Qty: 30 | Refills: 0
Start: 2024-04-15 | End: 2024-05-14

## 2024-04-15 RX ORDER — AMLODIPINE BESYLATE 2.5 MG/1
1 TABLET ORAL
Qty: 0 | Refills: 0 | DISCHARGE
Start: 2024-04-15

## 2024-04-15 RX ORDER — AMLODIPINE BESYLATE 2.5 MG/1
5 TABLET ORAL DAILY
Refills: 0 | Status: DISCONTINUED | OUTPATIENT
Start: 2024-04-15 | End: 2024-04-15

## 2024-04-15 RX ADMIN — Medication 2: at 17:05

## 2024-04-15 RX ADMIN — Medication 375 MILLIGRAM(S): at 06:45

## 2024-04-15 RX ADMIN — AMLODIPINE BESYLATE 5 MILLIGRAM(S): 2.5 TABLET ORAL at 16:29

## 2024-04-15 RX ADMIN — Medication 1: at 08:24

## 2024-04-15 RX ADMIN — Medication 81 MILLIGRAM(S): at 11:45

## 2024-04-15 RX ADMIN — LOSARTAN POTASSIUM 100 MILLIGRAM(S): 100 TABLET, FILM COATED ORAL at 05:43

## 2024-04-15 RX ADMIN — PANTOPRAZOLE SODIUM 40 MILLIGRAM(S): 20 TABLET, DELAYED RELEASE ORAL at 05:43

## 2024-04-15 RX ADMIN — Medication 2: at 11:42

## 2024-04-15 RX ADMIN — Medication 375 MILLIGRAM(S): at 05:43

## 2024-04-15 NOTE — PROGRESS NOTE ADULT - ASSESSMENT
79 years old female with h/o HTN, HLD, DM present to ED with frequent fall. Patient had 3 fall in last one week. Patient only remember first episode which happen after she was trying to go to bathroom, then she woke up on floor. Today, patient work up on floor, does not remember what happened and reported muscle cramp. Patient had old bruise on lateral chest from previous fall. No chest pain, SOB, palpitation, nauea, vomiting or diarrhea  Hypertensive, afebrile, sat well at RA. EKG with NSR, QTc 467. No leukocytosis,p lt 272, K 3.6, Cr 0.88. UA negative for UTI. CT head/C spine with no acute pathology or fracture. CT chest with no acute fracture. 79 years old female with h/o HTN, HLD, DM present to ED with frequent fall. Patient had 3 fall in last one week. Patient only remember first episode which happen after she was trying to go to bathroom, then she woke up on floor. Today, patient work up on floor, does not remember what happened and reported muscle cramp. Patient had old bruise on lateral chest from previous fall. No chest pain, SOB, palpitation, nauea, vomiting or diarrhea  Hypertensive, afebrile, sat well at RA. EKG with NSR, QTc 467. No leukocytosis,p lt 272, K 3.6, Cr 0.88. UA negative for UTI. CT head/C spine with no acute pathology or fracture. CT chest with no acute fracture.    Brain MRI no acute infarct or bleed.     Discharge home on ACE, ASA, statin, add Norvasc for BP control.

## 2024-04-15 NOTE — DISCHARGE NOTE PROVIDER - HOSPITAL COURSE
79 years old female with h/o HTN, HLD, DM present to ED with frequent fall. Patient had 3 fall in last one week. Patient only remember first episode which happen after she was trying to go to bathroom, then she woke up on floor. Today, patient work up on floor, does not remember what happened and reported muscle cramp. Patient had old bruise on lateral chest from previous fall. No chest pain, SOB, palpitation, nauea, vomiting or diarrhea  Hypertensive, afebrile, sat well at RA. EKG with NSR, QTc 467. No leukocytosis,p lt 272, K 3.6, Cr 0.88. UA negative for UTI. CT head/C spine with no acute pathology or fracture. CT chest with no acute fracture.  Brain MRI no acute infarct or bleed.   Discharge home on ACE, ASA, statin, add Norvasc for BP control.   Discussed case wit Dr Johnson on 4/15/24, agrees with plan.     #HTN  #HLD  #Frequent falls   #DM

## 2024-04-15 NOTE — DISCHARGE NOTE NURSING/CASE MANAGEMENT/SOCIAL WORK - NSDCVIVACCINE_GEN_ALL_CORE_FT
Tdap; 16-Aug-2019 12:36; Mariaa Bennett (RN); [employer]; [id number] (Exp. Date: 07-Jun-2021); IntraMuscular; Deltoid Left.; 0.5 milliLiter(s); VIS (VIS Published: 09-May-2013, VIS Presented: 16-Aug-2019);   Tdap; 04-Jan-2018 17:05; Delia Arellano (RN); [employer]; [id number]; IntraMuscular; Deltoid Left.; 0.5 milliLiter(s); VIS (VIS Published: 09-May-2013, VIS Presented: 04-Jan-2018);

## 2024-04-15 NOTE — DISCHARGE NOTE PROVIDER - NSDCCPCAREPLAN_GEN_ALL_CORE_FT
PRINCIPAL DISCHARGE DIAGNOSIS  Diagnosis: Frequent falls  Assessment and Plan of Treatment: Please take your medications as prescribed and follow up with your primary care doctor in 1 week.

## 2024-04-15 NOTE — PROGRESS NOTE ADULT - SUBJECTIVE AND OBJECTIVE BOX
INTERVAL HPI/OVERNIGHT EVENTS:  Pt seen and examined at bedside.     Allergies/Intolerance: No Known Allergies      MEDICATIONS  (STANDING):  aspirin  chewable 81 milliGRAM(s) Oral daily  atorvastatin 20 milliGRAM(s) Oral at bedtime  dextrose 10% Bolus 125 milliLiter(s) IV Bolus once  dextrose 5%. 1000 milliLiter(s) (50 mL/Hr) IV Continuous <Continuous>  dextrose 5%. 1000 milliLiter(s) (100 mL/Hr) IV Continuous <Continuous>  dextrose 50% Injectable 12.5 Gram(s) IV Push once  dextrose 50% Injectable 25 Gram(s) IV Push once  enoxaparin Injectable 40 milliGRAM(s) SubCutaneous every 24 hours  glucagon  Injectable 1 milliGRAM(s) IntraMuscular once  influenza  Vaccine (HIGH DOSE) 0.7 milliLiter(s) IntraMuscular once  insulin lispro (ADMELOG) corrective regimen sliding scale   SubCutaneous at bedtime  insulin lispro (ADMELOG) corrective regimen sliding scale   SubCutaneous three times a day before meals  losartan 100 milliGRAM(s) Oral daily  naproxen 375 milliGRAM(s) Oral every 12 hours  pantoprazole    Tablet 40 milliGRAM(s) Oral before breakfast  senna 2 Tablet(s) Oral at bedtime  sodium chloride 0.9%. 1000 milliLiter(s) (100 mL/Hr) IV Continuous <Continuous>    MEDICATIONS  (PRN):  acetaminophen     Tablet .. 650 milliGRAM(s) Oral every 6 hours PRN Mild Pain (1 - 3), Moderate Pain (4 - 6)  dextrose Oral Gel 15 Gram(s) Oral once PRN Blood Glucose LESS THAN 70 milliGRAM(s)/deciliter  melatonin 3 milliGRAM(s) Oral at bedtime PRN Insomnia  oxycodone    5 mG/acetaminophen 325 mG 1 Tablet(s) Oral every 6 hours PRN Severe Pain (7 - 10)        ROS: all systems reviewed and wnl      PHYSICAL EXAMINATION:  Vital Signs Last 24 Hrs  T(C): 36.6 (15 Apr 2024 05:27), Max: 36.8 (14 Apr 2024 17:12)  T(F): 97.9 (15 Apr 2024 05:27), Max: 98.2 (14 Apr 2024 17:12)  HR: 76 (15 Apr 2024 06:25) (69 - 82)  BP: 146/77 (15 Apr 2024 06:25) (132/79 - 160/79)  BP(mean): --  RR: 18 (15 Apr 2024 05:27) (18 - 18)  SpO2: 96% (15 Apr 2024 05:27) (94% - 97%)    Parameters below as of 14 Apr 2024 10:45  Patient On (Oxygen Delivery Method): room air      CAPILLARY BLOOD GLUCOSE      POCT Blood Glucose.: 181 mg/dL (15 Apr 2024 07:28)  POCT Blood Glucose.: 236 mg/dL (14 Apr 2024 20:57)  POCT Blood Glucose.: 152 mg/dL (14 Apr 2024 16:50)  POCT Blood Glucose.: 279 mg/dL (14 Apr 2024 10:58)        GENERAL:   NECK: supple, No JVD  CHEST/LUNG: clear to auscultation bilaterally; no rales, rhonchi, or wheezing b/l  HEART: normal S1, S2  ABDOMEN: BS+, soft, ND, NT   EXTREMITIES:  pulses palpable; no clubbing, cyanosis, or edema b/l LEs  SKIN: no rashes or lesions      LABS:    04-14    142  |  112<H>  |  10  ----------------------------<  155<H>  3.6   |  23  |  1.01    Ca    9.1      14 Apr 2024 07:15        Urinalysis Basic - ( 14 Apr 2024 07:15 )    Color: x / Appearance: x / SG: x / pH: x  Gluc: 155 mg/dL / Ketone: x  / Bili: x / Urobili: x   Blood: x / Protein: x / Nitrite: x   Leuk Esterase: x / RBC: x / WBC x   Sq Epi: x / Non Sq Epi: x / Bacteria: x            
Patient is a 79y old  Female who presents with a chief complaint of syncope/frequent fall (12 Apr 2024 12:49)      INTERVAL HPI/OVERNIGHT EVENTS: none     MEDICATIONS  (STANDING):  aspirin  chewable 81 milliGRAM(s) Oral daily  atorvastatin 20 milliGRAM(s) Oral at bedtime  dextrose 10% Bolus 125 milliLiter(s) IV Bolus once  dextrose 5%. 1000 milliLiter(s) (50 mL/Hr) IV Continuous <Continuous>  dextrose 5%. 1000 milliLiter(s) (100 mL/Hr) IV Continuous <Continuous>  dextrose 50% Injectable 12.5 Gram(s) IV Push once  dextrose 50% Injectable 25 Gram(s) IV Push once  enoxaparin Injectable 40 milliGRAM(s) SubCutaneous every 24 hours  glucagon  Injectable 1 milliGRAM(s) IntraMuscular once  influenza  Vaccine (HIGH DOSE) 0.7 milliLiter(s) IntraMuscular once  insulin lispro (ADMELOG) corrective regimen sliding scale   SubCutaneous at bedtime  insulin lispro (ADMELOG) corrective regimen sliding scale   SubCutaneous three times a day before meals  losartan 100 milliGRAM(s) Oral daily  pantoprazole    Tablet 40 milliGRAM(s) Oral before breakfast  senna 2 Tablet(s) Oral at bedtime  sodium chloride 0.9%. 1000 milliLiter(s) (100 mL/Hr) IV Continuous <Continuous>    MEDICATIONS  (PRN):  acetaminophen     Tablet .. 650 milliGRAM(s) Oral every 6 hours PRN Mild Pain (1 - 3), Moderate Pain (4 - 6)  dextrose Oral Gel 15 Gram(s) Oral once PRN Blood Glucose LESS THAN 70 milliGRAM(s)/deciliter  melatonin 3 milliGRAM(s) Oral at bedtime PRN Insomnia  oxycodone    5 mG/acetaminophen 325 mG 1 Tablet(s) Oral every 6 hours PRN Severe Pain (7 - 10)        Allergies    No Known Allergies    Intolerances        REVIEW OF SYSTEMS:  CONSTITUTIONAL: No fever, weight loss  EYES: No eye pain, visual disturbances, or discharge  ENMT:  No difficulty hearing, tinnitus, vertigo; No sinus or throat pain  RESPIRATORY: No cough, wheezing, chills or hemoptysis; No shortness of breath  CARDIOVASCULAR: No chest pain, palpitations, dizziness, or leg swelling  GASTROINTESTINAL: No abdominal or epigastric pain. No nausea, vomiting, or hematemesis; No diarrhea or constipation. No melena or hematochezia.  GENITOURINARY: No dysuria, frequency, hematuria, or incontinence  NEUROLOGICAL: No headaches, memory loss, loss of strength, numbness, or tremors  SKIN: No itching, burning, rashes, or lesions   MUSCULOSKELETAL: No joint pain or swelling; No muscle, back, or extremity pain  PSYCHIATRIC: No depression, anxiety, mood swings, or difficulty sleeping  HEME/LYMPH: No easy bruising, or bleeding gums      Vital Signs Last 24 Hrs  T(C): 36.3 (14 Apr 2024 10:45), Max: 36.9 (13 Apr 2024 23:41)  T(F): 97.4 (14 Apr 2024 10:45), Max: 98.4 (13 Apr 2024 23:41)  HR: 79 (14 Apr 2024 10:45) (66 - 79)  BP: 146/78 (14 Apr 2024 10:45) (120/70 - 176/89)  BP(mean): --  RR: 18 (14 Apr 2024 10:45) (18 - 18)  SpO2: 97% (14 Apr 2024 10:45) (96% - 98%)    Parameters below as of 14 Apr 2024 10:45  Patient On (Oxygen Delivery Method): room air          PHYSICAL EXAM:  GENERAL: NAD  HEAD:  Atraumatic, Normocephalic  EYES: EOMI, PERRLA, conjunctiva and sclera clear  ENMT: No tonsillar erythema, exudates, or enlargement;   NECK: Supple, Normal thyroid  NERVOUS SYSTEM:  Alert & Oriented X3, Good concentration; Motor Strength 5/5 B/L upper and lower extremities; DTRs 2+ intact and symmetric  CHEST/LUNG: CTABL; No rales, rhonchi, wheezing, or rubs  HEART: Regular rate and rhythm; No murmurs, rubs, or gallops  ABDOMEN: Soft, Nontender, Nondistended; Bowel sounds present  EXTREMITIES:  2+ Peripheral Pulses, No clubbing, cyanosis, or edema  LYMPH: No lymphadenopathy noted  SKIN: No rashes or lesions    LABS:                                   10.7   3.80  )-----------( 236      ( 13 Apr 2024 07:00 )             34.0     04-14    142  |  112<H>  |  10  ----------------------------<  155<H>  3.6   |  23  |  1.01    Ca    9.1      14 Apr 2024 07:15  Phos  2.9     04-13  Mg     1.8     04-13    TPro  7.5  /  Alb  3.2<L>  /  TBili  0.6  /  DBili  x   /  AST  32  /  ALT  22  /  AlkPhos  133<H>  04-13      Urinalysis Basic - ( 14 Apr 2024 07:15 )    Color: x / Appearance: x / SG: x / pH: x  Gluc: 155 mg/dL / Ketone: x  / Bili: x / Urobili: x   Blood: x / Protein: x / Nitrite: x   Leuk Esterase: x / RBC: x / WBC x   Sq Epi: x / Non Sq Epi: x / Bacteria: x        RADIOLOGY & ADDITIONAL TESTS:    Imaging Personally Reviewed:  [ ] YES  [ ] NO    Consultant(s) Notes Reviewed:  [ ] YES  [ ] NO    Care Discussed with Consultants/Other Providers [ ] YES  [ ] NO
Patient is a 79y old  Female who presents with a chief complaint of syncope/frequent fall (12 Apr 2024 12:49)      INTERVAL HPI/OVERNIGHT EVENTS: none     MEDICATIONS  (STANDING):  aspirin  chewable 81 milliGRAM(s) Oral daily  atorvastatin 20 milliGRAM(s) Oral at bedtime  dextrose 10% Bolus 125 milliLiter(s) IV Bolus once  dextrose 5%. 1000 milliLiter(s) (50 mL/Hr) IV Continuous <Continuous>  dextrose 5%. 1000 milliLiter(s) (100 mL/Hr) IV Continuous <Continuous>  dextrose 50% Injectable 12.5 Gram(s) IV Push once  dextrose 50% Injectable 25 Gram(s) IV Push once  enoxaparin Injectable 40 milliGRAM(s) SubCutaneous every 24 hours  glucagon  Injectable 1 milliGRAM(s) IntraMuscular once  influenza  Vaccine (HIGH DOSE) 0.7 milliLiter(s) IntraMuscular once  insulin lispro (ADMELOG) corrective regimen sliding scale   SubCutaneous at bedtime  insulin lispro (ADMELOG) corrective regimen sliding scale   SubCutaneous three times a day before meals  losartan 100 milliGRAM(s) Oral daily  pantoprazole    Tablet 40 milliGRAM(s) Oral before breakfast  potassium chloride   Powder 40 milliEquivalent(s) Oral every 4 hours  senna 2 Tablet(s) Oral at bedtime  sodium chloride 0.9%. 1000 milliLiter(s) (100 mL/Hr) IV Continuous <Continuous>    MEDICATIONS  (PRN):  acetaminophen     Tablet .. 650 milliGRAM(s) Oral every 6 hours PRN Mild Pain (1 - 3), Moderate Pain (4 - 6)  dextrose Oral Gel 15 Gram(s) Oral once PRN Blood Glucose LESS THAN 70 milliGRAM(s)/deciliter  melatonin 3 milliGRAM(s) Oral at bedtime PRN Insomnia  oxycodone    5 mG/acetaminophen 325 mG 1 Tablet(s) Oral every 6 hours PRN Severe Pain (7 - 10)      Allergies    No Known Allergies    Intolerances        REVIEW OF SYSTEMS:  CONSTITUTIONAL: No fever, weight loss  EYES: No eye pain, visual disturbances, or discharge  ENMT:  No difficulty hearing, tinnitus, vertigo; No sinus or throat pain  RESPIRATORY: No cough, wheezing, chills or hemoptysis; No shortness of breath  CARDIOVASCULAR: No chest pain, palpitations, dizziness, or leg swelling  GASTROINTESTINAL: No abdominal or epigastric pain. No nausea, vomiting, or hematemesis; No diarrhea or constipation. No melena or hematochezia.  GENITOURINARY: No dysuria, frequency, hematuria, or incontinence  NEUROLOGICAL: No headaches, memory loss, loss of strength, numbness, or tremors  SKIN: No itching, burning, rashes, or lesions   MUSCULOSKELETAL: No joint pain or swelling; No muscle, back, or extremity pain  PSYCHIATRIC: No depression, anxiety, mood swings, or difficulty sleeping  HEME/LYMPH: No easy bruising, or bleeding gums      Vital Signs Last 24 Hrs  T(C): 36.4 (13 Apr 2024 10:44), Max: 37.1 (12 Apr 2024 15:13)  T(F): 97.5 (13 Apr 2024 10:44), Max: 98.7 (12 Apr 2024 15:13)  HR: 74 (13 Apr 2024 10:44) (60 - 83)  BP: 147/81 (13 Apr 2024 10:44) (147/81 - 175/96)  BP(mean): --  RR: 18 (13 Apr 2024 10:44) (17 - 18)  SpO2: 96% (13 Apr 2024 10:44) (96% - 100%)    Parameters below as of 13 Apr 2024 10:44  Patient On (Oxygen Delivery Method): room air          PHYSICAL EXAM:  GENERAL: NAD  HEAD:  Atraumatic, Normocephalic  EYES: EOMI, PERRLA, conjunctiva and sclera clear  ENMT: No tonsillar erythema, exudates, or enlargement;   NECK: Supple, Normal thyroid  NERVOUS SYSTEM:  Alert & Oriented X3, Good concentration; Motor Strength 5/5 B/L upper and lower extremities; DTRs 2+ intact and symmetric  CHEST/LUNG: CTABL; No rales, rhonchi, wheezing, or rubs  HEART: Regular rate and rhythm; No murmurs, rubs, or gallops  ABDOMEN: Soft, Nontender, Nondistended; Bowel sounds present  EXTREMITIES:  2+ Peripheral Pulses, No clubbing, cyanosis, or edema  LYMPH: No lymphadenopathy noted  SKIN: No rashes or lesions    LABS:                        10.7   3.80  )-----------( 236      ( 13 Apr 2024 07:00 )             34.0     04-13    141  |  109<H>  |  9   ----------------------------<  156<H>  3.1<L>   |  24  |  0.86    Ca    8.7      13 Apr 2024 07:00  Phos  2.9     04-13  Mg     1.8     04-13    TPro  7.5  /  Alb  3.2<L>  /  TBili  0.6  /  DBili  x   /  AST  32  /  ALT  22  /  AlkPhos  133<H>  04-13      Urinalysis Basic - ( 13 Apr 2024 07:00 )    Color: x / Appearance: x / SG: x / pH: x  Gluc: 156 mg/dL / Ketone: x  / Bili: x / Urobili: x   Blood: x / Protein: x / Nitrite: x   Leuk Esterase: x / RBC: x / WBC x   Sq Epi: x / Non Sq Epi: x / Bacteria: x      CAPILLARY BLOOD GLUCOSE      POCT Blood Glucose.: 165 mg/dL (13 Apr 2024 07:39)  POCT Blood Glucose.: 219 mg/dL (12 Apr 2024 20:56)  POCT Blood Glucose.: 96 mg/dL (12 Apr 2024 16:12)      RADIOLOGY & ADDITIONAL TESTS:    Imaging Personally Reviewed:  [ ] YES  [ ] NO    Consultant(s) Notes Reviewed:  [ ] YES  [ ] NO    Care Discussed with Consultants/Other Providers [ ] YES  [ ] NO

## 2024-04-15 NOTE — DISCHARGE NOTE NURSING/CASE MANAGEMENT/SOCIAL WORK - NSDCPEFALRISK_GEN_ALL_CORE
For information on Fall & Injury Prevention, visit: [email or web address] OR  [email or web address] OR  https://www.cdc.gov/steadi/patient.html

## 2024-04-15 NOTE — DISCHARGE NOTE NURSING/CASE MANAGEMENT/SOCIAL WORK - PATIENT PORTAL LINK FT
You can access the FollowMyHealth Patient Portal offered by [facility] by registering at the following website: [email or web address]. By joining [facility]’s FollowMyHealth portal, you will also be able to view your health information using other applications (apps) compatible with our system.

## 2024-04-15 NOTE — DISCHARGE NOTE PROVIDER - NSDCMRMEDTOKEN_GEN_ALL_CORE_FT
amLODIPine 5 mg oral tablet: 1 tab(s) orally once a day  amLODIPine 5 mg oral tablet: 1 tab(s) orally once a day  aspirin 81 mg oral tablet, chewable: 1 tab(s) orally once a day  atorvastatin 20 mg oral tablet: 1 tab(s) orally once a day (at bedtime)  cyanocobalamin 100 mcg/mL injectable solution:   insulin glargine: 30 unit(s) subcutaneous once a day (at bedtime)  losartan 100 mg oral tablet: 1 tab(s) orally once a day  metFORMIN 1000 mg oral tablet: 1 tab(s) orally 2 times a day  omeprazole 40 mg oral delayed release capsule: 1 cap(s) orally once a day

## 2024-04-29 DIAGNOSIS — R25.2 CRAMP AND SPASM: ICD-10-CM

## 2024-04-29 DIAGNOSIS — E78.00 PURE HYPERCHOLESTEROLEMIA, UNSPECIFIED: ICD-10-CM

## 2024-04-29 DIAGNOSIS — Z98.42 CATARACT EXTRACTION STATUS, LEFT EYE: ICD-10-CM

## 2024-04-29 DIAGNOSIS — Z91.81 HISTORY OF FALLING: ICD-10-CM

## 2024-04-29 DIAGNOSIS — R29.6 REPEATED FALLS: ICD-10-CM

## 2024-04-29 DIAGNOSIS — Z98.41 CATARACT EXTRACTION STATUS, RIGHT EYE: ICD-10-CM

## 2024-04-29 DIAGNOSIS — E11.9 TYPE 2 DIABETES MELLITUS WITHOUT COMPLICATIONS: ICD-10-CM

## 2024-04-29 DIAGNOSIS — I10 ESSENTIAL (PRIMARY) HYPERTENSION: ICD-10-CM

## 2024-04-29 DIAGNOSIS — Z79.4 LONG TERM (CURRENT) USE OF INSULIN: ICD-10-CM

## 2024-04-29 DIAGNOSIS — R55 SYNCOPE AND COLLAPSE: ICD-10-CM

## 2024-04-29 DIAGNOSIS — Z79.82 LONG TERM (CURRENT) USE OF ASPIRIN: ICD-10-CM

## 2024-04-29 DIAGNOSIS — Z79.84 LONG TERM (CURRENT) USE OF ORAL HYPOGLYCEMIC DRUGS: ICD-10-CM

## 2024-12-26 ENCOUNTER — EMERGENCY (EMERGENCY)
Facility: HOSPITAL | Age: 79
LOS: 0 days | Discharge: HOME | End: 2024-12-27
Attending: STUDENT IN AN ORGANIZED HEALTH CARE EDUCATION/TRAINING PROGRAM
Payer: COMMERCIAL

## 2024-12-26 VITALS
SYSTOLIC BLOOD PRESSURE: 113 MMHG | OXYGEN SATURATION: 98 % | HEART RATE: 61 BPM | TEMPERATURE: 98 F | RESPIRATION RATE: 18 BRPM | DIASTOLIC BLOOD PRESSURE: 73 MMHG | HEIGHT: 65 IN | WEIGHT: 169.98 LBS

## 2024-12-26 DIAGNOSIS — Z98.89 OTHER SPECIFIED POSTPROCEDURAL STATES: Chronic | ICD-10-CM

## 2024-12-26 DIAGNOSIS — E11.9 TYPE 2 DIABETES MELLITUS WITHOUT COMPLICATIONS: ICD-10-CM

## 2024-12-26 DIAGNOSIS — S52.90XA UNSPECIFIED FRACTURE OF UNSPECIFIED FOREARM, INITIAL ENCOUNTER FOR CLOSED FRACTURE: Chronic | ICD-10-CM

## 2024-12-26 DIAGNOSIS — Z87.81 PERSONAL HISTORY OF (HEALED) TRAUMATIC FRACTURE: Chronic | ICD-10-CM

## 2024-12-26 DIAGNOSIS — Z96.7 PRESENCE OF OTHER BONE AND TENDON IMPLANTS: Chronic | ICD-10-CM

## 2024-12-26 DIAGNOSIS — I10 ESSENTIAL (PRIMARY) HYPERTENSION: ICD-10-CM

## 2024-12-26 DIAGNOSIS — R42 DIZZINESS AND GIDDINESS: ICD-10-CM

## 2024-12-26 DIAGNOSIS — N60.02 SOLITARY CYST OF LEFT BREAST: Chronic | ICD-10-CM

## 2024-12-26 DIAGNOSIS — Z98.51 TUBAL LIGATION STATUS: Chronic | ICD-10-CM

## 2024-12-26 DIAGNOSIS — E78.5 HYPERLIPIDEMIA, UNSPECIFIED: ICD-10-CM

## 2024-12-26 PROCEDURE — 99284 EMERGENCY DEPT VISIT MOD MDM: CPT

## 2024-12-26 RX ORDER — MECLIZINE HCL 12.5 MG
25 TABLET ORAL ONCE
Refills: 0 | Status: COMPLETED | OUTPATIENT
Start: 2024-12-26 | End: 2024-12-26

## 2024-12-26 RX ORDER — SODIUM CHLORIDE 9 MG/ML
1000 INJECTION, SOLUTION INTRAMUSCULAR; INTRAVENOUS; SUBCUTANEOUS ONCE
Refills: 0 | Status: COMPLETED | OUTPATIENT
Start: 2024-12-26 | End: 2024-12-26

## 2024-12-26 RX ADMIN — Medication 25 MILLIGRAM(S): at 23:52

## 2024-12-26 NOTE — ED ADULT TRIAGE NOTE - AS TEMP SITE
HPI     Patient in for progress check/refraction.  S/P YAG laser posterior   capsulotomy in     Pt returns for refraction after yag procedure with .    Being followed for (diagnosis):       Date last seen:  04/25/2019    Doctor last seen:      Prescribed eye medications(s) using:  Steroid drops OD     OTC eye medication(s) using:  No     Signs/symptoms of condition resolved/better/stable/worse?:              Last edited by Jairon June, OD on 5/9/2019  5:27 PM. (History)            Assessment /Plan     For exam results, see Encounter Report.    1. Post-operative state     2. S/P cataract surgery, right     3. Nuclear sclerosis, left     4. Refractive errors                    S/P cataract surgery of the right eye with posterior chamber intraocular lens.  Prior finding of visually significant posterior capsular opacification of the intact posterior lens capsule in the right eye.  S/P YAG laser posterior capsulotomy in the right eye.    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    New spectacle lens Rx issued for full-time wear.      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  Being monitored by Dr. Camacho       
oral

## 2024-12-26 NOTE — ED ADULT TRIAGE NOTE - CHIEF COMPLAINT QUOTE
Patient BIB [facility] c/o dizziness and vomiting x 2 days. Patient states she ran out of all medications. Pmh vertigo, htn, dm, hld. .

## 2024-12-26 NOTE — ED ADULT NURSE NOTE - CAS DISC DELAYS
Quality 226: Preventive Care And Screening: Tobacco Use: Screening And Cessation Intervention: Patient screened for tobacco use and is an ex/non-smoker Waiting private transportation

## 2024-12-26 NOTE — ED ADULT NURSE NOTE - OBJECTIVE STATEMENT
pt a&ox4,ambulatory at baseline as per pt. Pt presenting with vertigo, vomiting, and diarrhea for the past 3 days. Pt reports she falls a lot, denies use of blood thinners. Denies CP,SOB, blurry vision. PMH - HTN, DM, HLD.

## 2024-12-26 NOTE — ED ADULT NURSE NOTE - NSFALLRISKINTERV_ED_ALL_ED

## 2024-12-26 NOTE — ED ADULT NURSE NOTE - NS ED NURSE LEVEL OF CONSCIOUSNESS SPEECH
Consult  Pulmonary, Critical Care    Name: Zina Diez MRN: [id number]   : 1953 Hospital: [facility]   Date: 2021  Admission date: 2021 Hospital Day: 3       Subjective/Interval History:   Seen in the emergency room wearing noninvasive ventilator. Patient has underlying renal insufficiency developed worsening shortness of breath cough presented to the emergency room chest x-ray shows pulmonary edema. He was profoundly hypoxic is now on noninvasive ventilator and feels more comfortable. Has not had any significant urine output since he has been in the ER. He also complains of new onset left arm swelling   post void bladder scan showed greater than 200 cc urine retention and Quesada catheter placed with 500 cc removed. Overnight he has put out an additional 1200 cc. Respirations improved currently nonlabored on nasal oxygen. Duplex scan of the left arm did show left axillary and proximal brachial DVT and heparin was started. On heparin with Quesada catheter and he has had slight bleeding at the urethral meatus. Ultrasound of the abdomen is pending    Patient Active Problem List   Diagnosis Code    GI bleed K92.2    Pulmonary edema J81.1       IMPRESSION:   1. Acute hypoxic respiratory failure improved with diuresis now on nasal oxygen  2. Acute pulmonary edema  3. Bilateral pleural effusions will follow  4. Acute on chronic kidney disease abrupt worsening likely due to obstructive uropathy  5. Obstructive uropathy Quesada catheter now in place with good urine output. 6. Severe hypertension currently on clonidine TTS 3 will add hydralazine 50 mg 3 times daily  7. DVT left axillary and brachial  8. Anemia worsening despite diuresis we will transfuse  9. Troponin leak likely due to renal insufficiency stable has not risen any further  Body mass index is 24.41 kg/m². 10.       RECOMMENDATIONS/PLAN:   1. Continue IV Lasix  2.  We will add Flomax presuming prostate enlargement although he denies frequency post void dribbling or small volume urine output  3. We will resume sodium bicarb  4. Continue nasal oxygen will discontinued noninvasive ventilator  5. We will transfuse 2 units of blood         Subjective/Initial History:       I was asked by Kathy Swan MD to see Jacobo Uribe a 78 y.o.  male  in consultation for a chief complaint of shortness of breath pulmonary edema acute hypoxic respiratory failure        Patient PCP: Kyle Sarabia MD  PMH:  has a past medical history of Chronic kidney disease and Hypertension. PSH:   has no past surgical history on file. FHX: family history is not on file. SHX:  reports that he has never smoked. He has never used smokeless tobacco.    Systemic review somewhat limited as he is on noninvasive ventilator remains short of breath although states he is feeling better  General he has not noted any worsening edema of his upper legs fever chills or sweats does complain of new onset swelling of his left hand and arm  Eyes no double vision or momentary blindness  ENT no facial pain over the sinuses  Endocrinologic no polyuria polydipsia  Musculoskeletal no swollen tender joints  Neurologic no history seizures or syncope  Gastrointestinal no nausea vomiting acid indigestion  Genitourinary states he does still pass fair amount of urine each day has not noted any decrease in that.   Does not have to strain to urinate has no bleeding or drainage  Cardiovascular he is not aware of any underlying heart disease has not had chest pain diaphoresis has had worsening shortness of breath laying down and with exertion  Respiratory worsening shortness of breath over the last week or more no significant cough no sputum production no chills or sweats did have history COVID-19 pneumonitis January of this year    No Known Allergies   MEDS:   Current Facility-Administered Medications   Medication    furosemide (LASIX) injection 80 mg    sodium polystyrene (KAYEXALATE) 15 gram/60 mL oral suspension 30 g    tamsulosin (FLOMAX) capsule 0.4 mg    hydrALAZINE (APRESOLINE) tablet 50 mg    hydrALAZINE (APRESOLINE) 20 mg/mL injection 40 mg    cloNIDine (CATAPRES) 0.3 mg/24 hr patch 1 Patch    heparin 25,000 units in D5W 250 ml infusion    heparin (porcine) 1,000 unit/mL injection 6,340 Units    Or    heparin (porcine) 1,000 unit/mL injection 3,170 Units        Current Facility-Administered Medications:     furosemide (LASIX) injection 80 mg, 80 mg, IntraVENous, Q12H, Haim Telles MD    sodium polystyrene (KAYEXALATE) 15 gram/60 mL oral suspension 30 g, 30 g, Oral, NOW, Adelaida Reynolds MD    tamsulosin [facility]) capsule 0.4 mg, 0.4 mg, Oral, DAILY, Adelaida Reynolds MD    hydrALAZINE (APRESOLINE) tablet 50 mg, 50 mg, Oral, TID, Adelaida Reynolds MD    hydrALAZINE (APRESOLINE) 20 mg/mL injection 40 mg, 40 mg, IntraVENous, Q6H PRN, Adelaida Reynolds MD, 40 mg at 21 1128    cloNIDine (CATAPRES) 0.3 mg/24 hr patch 1 Patch, 1 Patch, TransDERmal, Q7D, Adelaida Reynolds MD    heparin 25,000 units in D5W 250 ml infusion, 18-36 Units/kg/hr (Adjusted), IntraVENous, TITRATE, Haim Telles MD, Stopped at 21 0857    heparin (porcine) 1,000 unit/mL injection 6,340 Units, 80 Units/kg (Adjusted), IntraVENous, PRN **OR** heparin (porcine) 1,000 unit/mL injection 3,170 Units, 40 Units/kg (Adjusted), IntraVENous, PRN, Haim Telles MD, 3,170 Units at 21 0008      Objective:     Vital Signs: Telemetry:    normal sinus rhythm Intake/Output:   Visit Vitals  BP (!) 167/106 (BP 1 Location: Right lower arm, BP Patient Position: Supine)   Pulse 81   Temp 97.7 °F (36.5 °C)   Resp 20   Ht 6' (1.829 m)   Wt 81.6 kg (180 lb)   SpO2 97%   BMI 24.41 kg/m²       Temp (24hrs), Av.3 °F (36.3 °C), Min:96.5 °F (35.8 °C), Max:97.7 °F (36.5 °C)        O2 Device: Nasal cannula O2 Flow Rate (L/min): 2 l/min         Body mass index is 24.41 kg/m². Wt Readings from Last 4 Encounters:   07/12/21 81.6 kg (180 lb)   01/05/21 79.4 kg (175 lb)          Intake/Output Summary (Last 24 hours) at 7/14/2021 0938  Last data filed at 7/13/2021 1545  Gross per 24 hour   Intake --   Output 500 ml   Net -500 ml       Last shift:      No intake/output data recorded. Last 3 shifts: 07/12 1901 - 07/14 0700  In: 10 [I.V.:10]  Out: 500 [Urine:500]       Hemodynamics:    CO:    CI:    CVP:    SVR:   PAP Systolic:    PAP Diastolic:    PVR:    BZ31:       Ventilator Settings:      Mode Rate TV Press PEEP FiO2 PIP Min. Vent               28 %     9.7 l/min      Physical Exam:    General:  male; currently on nasal oxygen in no distress  HEENT: NCAT, visible oral mucosa is moist and clear  Eyes: anicteric; conjunctiva clear extraocular movements intact  Neck: no nodes,,no accessory MM use. no respiratory distress  Chest: no deformity,   Cardiac: Regular rate and rhythm  Lungs: distant breath sounds; Lear anteriorly and laterally with rales in both bases  Abd: Soft positive bowel sounds no tenderness  Ext: Extensive edema pitting and lymphedema of the lower extremities with new onset significant edema of the left arm; no joint swelling; No clubbing  : Cloudy urine  Neuro: Alert awake no distress speech is clear moves all 4 extremities  Psych-calm, oriented to person;   Skin: warm, dry, no cyanosis;   Pulses: Brachial and radial pulses intact  Capillary:slow capillary refill      Labs:    Recent Labs     07/14/21  0720 07/13/21 2003 07/13/21  1312 07/12/21  2240 07/12/21  2240   WBC 4.9  --   --   --  6.6   HGB 6.7* 7.1* 6.9*   < > 7.2*     --   --   --  193   APTT >153.0* 72.3* 28.8  --   --     < > = values in this interval not displayed.      Recent Labs     07/14/21  0720 07/13/21  0300 07/12/21  2240     142  --  139   K 6.0*  6.0*  --  5.7*   *  113*  --  111*   CO2 16*  17*  --  17*   GLU 91  91  --  110*   BUN 72*  71*  -- 59*   CREA 6.41*  6.36*  --  6.02*   CA 8.2*  8.2*  --  8.5   MG 2.9*  --  2.8*   PHOS 8.5*  --   --    LAC  --  1.5 2.6*   ALB 3.2*  3.2*  --  3.7   ALT 14  --  19     currently on noninvasive ventilator inspiratory pressure 16 expiratory pressure six rate 16 FiO2 28% with oxygen saturation 96%   Recent Labs     07/14/21  0720 07/13/21  0300 07/12/21  2240     --   --    TROIQ 0.17* 0.18* 0.18*     BNP greater than 35,000  Lab Results   Component Value Date/Time    Culture result: No growth 6 days 01/05/2021 06:30 PM        Imaging:  I have personally reviewed the patients radiographs and have reviewed the reports:    CXR Results  (Last 48 hours)               07/12/21 2251  XR CHEST PORT Final result    Impression:  Findings/impression:       Cardiac silhouette is enlarged. Central vascular congestion and patchy central   airspace disease/edema. Suspect trace right pleural effusion. No evidence of pneumothorax. No acute osseous abnormality identified. Narrative:  Study: XR CHEST PORT       Clinical indication: sob       Comparison: None. To me chest x-ray consistent with cardiomegaly pulmonary edema and bilateral pleural effusions           Results from Hospital Encounter encounter on 07/12/21    XR CHEST PORT    Narrative  1 view comparison to 12    Continued cardiomegaly and congestion. If There is mild interstitial edema, it  is unchanged. Right pleural effusion and adjacent atelectasis have improved. XR CHEST PORT    Narrative  Study: XR CHEST PORT    Clinical indication: sob    Comparison: None. Impression  Findings/impression:    Cardiac silhouette is enlarged. Central vascular congestion and patchy central  airspace disease/edema. Suspect trace right pleural effusion. No evidence of pneumothorax. No acute osseous abnormality identified.       Results from [place] encounter on 01/05/21    XR CHEST PORT    Narrative  Portable AP view at [address] 60 hours. Comparison 23 November 2008. Cardiac silhouette size is borderline enlarged. Arcuate lucency adjacent to the left side of the aortic knob margin could  reflect a tiny pneumomediastinum. Mild pulmonary vascular congestion, without overt edema. Patchy densities in the lower lungs could be minimal infiltrate. Consider  follow-up if warranted clinically. Impression  IMPRESSION: Subtle but potentially clinically significant findings as above. Suggest radiographic follow-up. Results from [place] encounter on 01/05/21    CT CHEST WO CONT    Narrative  Images obtained without contrast include the abdomen and pelvis. Comparison portable chest radiograph earlier today. Dose Reduction:  All CT scans at this facility are performed using dose reduction optimization  techniques as appropriate to a performed exam including the following: Automated  exposure control, adjustments of the mA and/or kV according to patient size, or  use of iterative reconstruction technique. Subtle peripheral groundglass densities are noted in both lungs, could reflect  Covid pneumonia or other. No pneumothorax or pneumomediastinum. The radiographic findings suspicious for  pneumomediastinum probably was artifact, perhaps related to cardiac motion. No pleural effusion or adenopathy. Cardiomegaly again noted. Impression  IMPRESSION:  1. No pneumomediastinum or pneumothorax. 2. Cardiomegaly. 3. Subtle groundglass densities in both lungs might be pneumonia or other. Discussion patient with worsening renal insufficiency has developed pulmonary edema acute hypoxic respiratory failure. He states he still has urine output normally at home. We will give him high-dose IV Lasix to see if diuresis is induced. He very likely will need dialysis. He has minimal elevation in troponin probably troponin leak from hypoxia or just due to his renal insufficiency. He is not having any chest pain.   Does have severe hypertension. Has been started on clonidine and hydralazine. 7/14 no distress today on nasal oxygen. Did have residual on post void bladder scan and Quesada catheter was placed with good diuresis overnight. Despite this potassium remains elevated. We will give 1 dose of Kayexalate. Despite diuresis hemoglobin is dropped to 6.7 will transfuse. He denies frequency small-volume urine or straining to urinate at home despite this with signs of obstruction we will add Flomax. Potassium 6 today we will give 1 dose of Kayexalate and continue diuresis  Time of care 20 minutes       Thank you for allowing us to participate in the care of this patient.   We will follow along with you     Dolly Nunn MD Speaking Coherently

## 2024-12-27 VITALS
SYSTOLIC BLOOD PRESSURE: 118 MMHG | HEART RATE: 60 BPM | OXYGEN SATURATION: 100 % | DIASTOLIC BLOOD PRESSURE: 88 MMHG | RESPIRATION RATE: 18 BRPM | TEMPERATURE: 99 F

## 2024-12-27 LAB
ALBUMIN SERPL ELPH-MCNC: 3.3 G/DL — SIGNIFICANT CHANGE UP (ref 3.3–5)
ALP SERPL-CCNC: 137 U/L — HIGH (ref 40–120)
ALT FLD-CCNC: 18 U/L — SIGNIFICANT CHANGE UP (ref 12–78)
ANION GAP SERPL CALC-SCNC: 5 MMOL/L — SIGNIFICANT CHANGE UP (ref 5–17)
AST SERPL-CCNC: 18 U/L — SIGNIFICANT CHANGE UP (ref 15–37)
BASOPHILS # BLD AUTO: 0.02 K/UL — SIGNIFICANT CHANGE UP (ref 0–0.2)
BASOPHILS NFR BLD AUTO: 0.5 % — SIGNIFICANT CHANGE UP (ref 0–2)
BILIRUB SERPL-MCNC: 0.5 MG/DL — SIGNIFICANT CHANGE UP (ref 0.2–1.2)
BUN SERPL-MCNC: 17 MG/DL — SIGNIFICANT CHANGE UP (ref 7–23)
CALCIUM SERPL-MCNC: 9.3 MG/DL — SIGNIFICANT CHANGE UP (ref 8.5–10.1)
CHLORIDE SERPL-SCNC: 110 MMOL/L — HIGH (ref 96–108)
CO2 SERPL-SCNC: 24 MMOL/L — SIGNIFICANT CHANGE UP (ref 22–31)
CREAT SERPL-MCNC: 1.06 MG/DL — SIGNIFICANT CHANGE UP (ref 0.5–1.3)
EGFR: 54 ML/MIN/1.73M2 — LOW
EOSINOPHIL # BLD AUTO: 0.01 K/UL — SIGNIFICANT CHANGE UP (ref 0–0.5)
EOSINOPHIL NFR BLD AUTO: 0.2 % — SIGNIFICANT CHANGE UP (ref 0–6)
GLUCOSE SERPL-MCNC: 203 MG/DL — HIGH (ref 70–99)
HCT VFR BLD CALC: 37.3 % — SIGNIFICANT CHANGE UP (ref 34.5–45)
HGB BLD-MCNC: 11.9 G/DL — SIGNIFICANT CHANGE UP (ref 11.5–15.5)
IMM GRANULOCYTES NFR BLD AUTO: 0.2 % — SIGNIFICANT CHANGE UP (ref 0–0.9)
LYMPHOCYTES # BLD AUTO: 1.22 K/UL — SIGNIFICANT CHANGE UP (ref 1–3.3)
LYMPHOCYTES # BLD AUTO: 29.1 % — SIGNIFICANT CHANGE UP (ref 13–44)
MCHC RBC-ENTMCNC: 23.9 PG — LOW (ref 27–34)
MCHC RBC-ENTMCNC: 31.9 G/DL — LOW (ref 32–36)
MCV RBC AUTO: 75.1 FL — LOW (ref 80–100)
MONOCYTES # BLD AUTO: 0.3 K/UL — SIGNIFICANT CHANGE UP (ref 0–0.9)
MONOCYTES NFR BLD AUTO: 7.2 % — SIGNIFICANT CHANGE UP (ref 2–14)
NEUTROPHILS # BLD AUTO: 2.63 K/UL — SIGNIFICANT CHANGE UP (ref 1.8–7.4)
NEUTROPHILS NFR BLD AUTO: 62.8 % — SIGNIFICANT CHANGE UP (ref 43–77)
NRBC # BLD: 0 /100 WBCS — SIGNIFICANT CHANGE UP (ref 0–0)
PLATELET # BLD AUTO: 248 K/UL — SIGNIFICANT CHANGE UP (ref 150–400)
POTASSIUM SERPL-MCNC: 4.5 MMOL/L — SIGNIFICANT CHANGE UP (ref 3.5–5.3)
POTASSIUM SERPL-SCNC: 4.5 MMOL/L — SIGNIFICANT CHANGE UP (ref 3.5–5.3)
PROT SERPL-MCNC: 8.2 GM/DL — SIGNIFICANT CHANGE UP (ref 6–8.3)
RBC # BLD: 4.97 M/UL — SIGNIFICANT CHANGE UP (ref 3.8–5.2)
RBC # FLD: 14.7 % — HIGH (ref 10.3–14.5)
SODIUM SERPL-SCNC: 139 MMOL/L — SIGNIFICANT CHANGE UP (ref 135–145)
TROPONIN I, HIGH SENSITIVITY RESULT: 5.4 NG/L — SIGNIFICANT CHANGE UP
WBC # BLD: 4.19 K/UL — SIGNIFICANT CHANGE UP (ref 3.8–10.5)
WBC # FLD AUTO: 4.19 K/UL — SIGNIFICANT CHANGE UP (ref 3.8–10.5)

## 2024-12-27 PROCEDURE — 93010 ELECTROCARDIOGRAM REPORT: CPT

## 2024-12-27 RX ORDER — MECLIZINE HCL 12.5 MG
1 TABLET ORAL
Qty: 14 | Refills: 0 | DISCHARGE
Start: 2024-12-27 | End: 2025-01-02

## 2024-12-27 RX ORDER — MECLIZINE HCL 12.5 MG
1 TABLET ORAL
Qty: 14 | Refills: 0
Start: 2024-12-27 | End: 2025-01-02

## 2024-12-27 RX ORDER — MECLIZINE HCL 12.5 MG
25 TABLET ORAL ONCE
Refills: 0 | Status: COMPLETED | OUTPATIENT
Start: 2024-12-27 | End: 2024-12-27

## 2024-12-27 RX ADMIN — SODIUM CHLORIDE 2000 MILLILITER(S): 9 INJECTION, SOLUTION INTRAMUSCULAR; INTRAVENOUS; SUBCUTANEOUS at 00:01

## 2024-12-27 RX ADMIN — Medication 25 MILLIGRAM(S): at 03:41

## 2024-12-27 NOTE — ED PROVIDER NOTE - PATIENT PORTAL LINK FT
You can access the FollowMyHealth Patient Portal offered by [facility] by registering at the following website: [email or web address]. By joining [employer]’s FollowMyHealth portal, you will also be able to view your health information using other applications (apps) compatible with our system.

## 2024-12-27 NOTE — ED PROVIDER NOTE - NSFOLLOWUPINSTRUCTIONS_ED_ALL_ED_FT
Benign Positional Vertigo    Take Meclizine 25mg every 8 hours as needed for dizziness/vertigo symptoms.      Vertigo is the feeling that you or your surroundings are moving when they are not. Benign positional vertigo is the most common form of vertigo. The cause of this condition is not serious (is benign). This condition is triggered by certain movements and positions (is positional). This condition can be dangerous if it occurs while you are doing something that could endanger you or others, such as driving.    What are the causes?  In many cases, the cause of this condition is not known. It may be caused by a disturbance in an area of the inner ear that helps your brain to sense movement and balance. This disturbance can be caused by a viral infection (labyrinthitis), head injury, or repetitive motion.    What increases the risk?  This condition is more likely to develop in:  Women.  People who are 50 years of age or older.  What are the signs or symptoms?  Symptoms of this condition usually happen when you move your head or your eyes in different directions. Symptoms may start suddenly, and they usually last for less than a minute. Symptoms may include:  Loss of balance and falling.  Feeling like you are spinning or moving.  Feeling like your surroundings are spinning or moving.  Nausea and vomiting.  Blurred vision.  Dizziness.  Involuntary eye movement (nystagmus).  Symptoms can be mild and cause only slight annoyance, or they can be severe and interfere with daily life. Episodes of benign positional vertigo may return (recur) over time, and they may be triggered by certain movements. Symptoms may improve over time.    How is this diagnosed?  This condition is usually diagnosed by medical history and a physical exam of the head, neck, and ears. You may be referred to a health care provider who specializes in ear, nose, and throat (ENT) problems (otolaryngologist) or a provider who specializes in disorders of the nervous system (neurologist). You may have additional testing, including:  MRI.  A CT scan.  Eye movement tests. Your health care provider may ask you to change positions quickly while he or she watches you for symptoms of benign positional vertigo, such as nystagmus. Eye movement may be tested with an electronystagmogram (ENG), caloric stimulation, the Trino-Hallpike test, or the roll test.  An electroencephalogram (EEG). This records electrical activity in your brain.  Hearing tests.  How is this treated?  Usually, your health care provider will treat this by moving your head in specific positions to adjust your inner ear back to normal. Surgery may be needed in severe cases, but this is rare. In some cases, benign positional vertigo may resolve on its own in 2-4 weeks.    Follow these instructions at home:  Safety     Move slowly.  Avoid sudden body or head movements.  Avoid driving.  Avoid operating heavy machinery.  Avoid doing any tasks that would be dangerous to you or others if a vertigo episode would occur.  If you have trouble walking or keeping your balance, try using a cane for stability. If you feel dizzy or unstable, sit down right away.  Return to your normal activities as told by your health care provider. Ask your health care provider what activities are safe for you.  General instructions     Take over-the-counter and prescription medicines only as told by your health care provider.  Avoid certain positions or movements as told by your health care provider.  Drink enough fluid to keep your urine clear or pale yellow.  Keep all follow-up visits as told by your health care provider. This is important.    Contact a health care provider if:  You have a fever.  Your condition gets worse or you develop new symptoms.  Your family or friends notice any behavioral changes.  Your nausea or vomiting gets worse.  You have numbness or a “pins and needles” sensation.  Get help right away if:  You have difficulty speaking or moving.  You are always dizzy.  You faint.  You develop severe headaches.  You have weakness in your legs or arms.  You have changes in your hearing or vision.  You develop a stiff neck.  You develop sensitivity to light.

## 2024-12-27 NOTE — ED ADULT NURSE REASSESSMENT NOTE - NS ED NURSE REASSESS COMMENT FT1
Pt discharged at 0300. VSS no complaints of dizziness or s/s. Pt IV catherter removed. Pt unable to to get home, [facility] Nurse DIrector Antione made aware, in which he set up a Uber via hospital services. Pt requested to use the rest room prior to leaving. Pt ambulated with steady gait in accompany with family member to restroom. Pt then demanded another dose of medication prior to leaving at this time, but did not want to wait for the medication order then for administration and demanded to be wheeled out by staff. At 0305 Upon leaving ED pt started having dizziness and similar symptoms to presentation, [place] Direct Nurse Antione who was at the scene instructed staff to bring patient back to waiting room due to unsafe discharge. Pt beligerant at this time demanded more medication and another uber ride. MD Syed made aware, anoptherdose of medication to be given, [facility] Nurse Director Antione instructed pt in waiting room that we can give her another dose of medication but may not be able to provide another Uber ride. Pt demanded to be retriaged and ambulated with steady gait with family member  and sat down in waiting room.

## 2024-12-27 NOTE — ED PROVIDER NOTE - CLINICAL SUMMARY MEDICAL DECISION MAKING FREE TEXT BOX
79-year-old female with past medical history of hypertension hyperlipidemia diabetes presenting with dizziness.  Patient states she has been dizzy all day, has not been able to tolerate p.o. over the last 1 day.  States feels like prior vertigo but ran out of meclizine.  Denies chest pain shortness breath fever cough chills abdominal pain headache      This patient presents with dizziness, most consistent with a peripheral cause, likely vertigo. Differential diagnoses includes: BPPV versus labrynthitis.*= No red flag features for central vertigo to include gradual onset, vertical/bidirectional or nonfatigable nystagmus, focal neurologic findings on exam (including inability to ambulate). Presentation not consistent with an acute CNS infection, vertebral basilar artery insufficiency, cerebellar hemorrhage or infarction, intracranial mass or bleed, temporal lobe epilepsy, multiple sclerosis, trauma, complex migraine headache. Other acute, emergent causes of vertigo are unlikely given at this time. No indication for head imaging at this time.    Plan: meclizine, supportive care, serial reassessment    update: tolerating PO, labs and EKG negative, asymtpomatic after meclizine, will refill meclizine

## 2025-01-30 NOTE — ED PROVIDER NOTE - AREA
Spoke to patient to let her know her surgery letter has posted to her my chart and will go out in the mail. I reviewed the diet and info class that that won't show up in her my chart.  I informed her to be on the look out for an e-mail from Lili in your junk/spam folder.  I explained what will be in the e-mail and to please reply to it so we know you received it.  I asked her to review her appointments and reach out to me as soon as she can if there is any scheduling conflicts, that way we can try to reschedule the appointment with out having to move her surgery. She acknowledged ok and thank you    
diffuse

## 2025-02-11 ENCOUNTER — INPATIENT (INPATIENT)
Facility: HOSPITAL | Age: 80
LOS: 6 days | Discharge: NURSING, REHAB OR LONG-TERM CARE | End: 2025-02-18
Attending: STUDENT IN AN ORGANIZED HEALTH CARE EDUCATION/TRAINING PROGRAM | Admitting: STUDENT IN AN ORGANIZED HEALTH CARE EDUCATION/TRAINING PROGRAM
Payer: COMMERCIAL

## 2025-02-11 VITALS
SYSTOLIC BLOOD PRESSURE: 157 MMHG | WEIGHT: 175.05 LBS | HEIGHT: 65 IN | OXYGEN SATURATION: 100 % | TEMPERATURE: 98 F | DIASTOLIC BLOOD PRESSURE: 82 MMHG | RESPIRATION RATE: 16 BRPM | HEART RATE: 88 BPM

## 2025-02-11 DIAGNOSIS — Z96.7 PRESENCE OF OTHER BONE AND TENDON IMPLANTS: Chronic | ICD-10-CM

## 2025-02-11 DIAGNOSIS — Z98.89 OTHER SPECIFIED POSTPROCEDURAL STATES: Chronic | ICD-10-CM

## 2025-02-11 DIAGNOSIS — S52.90XA UNSPECIFIED FRACTURE OF UNSPECIFIED FOREARM, INITIAL ENCOUNTER FOR CLOSED FRACTURE: Chronic | ICD-10-CM

## 2025-02-11 DIAGNOSIS — Z98.51 TUBAL LIGATION STATUS: Chronic | ICD-10-CM

## 2025-02-11 DIAGNOSIS — N60.02 SOLITARY CYST OF LEFT BREAST: Chronic | ICD-10-CM

## 2025-02-11 DIAGNOSIS — Z87.81 PERSONAL HISTORY OF (HEALED) TRAUMATIC FRACTURE: Chronic | ICD-10-CM

## 2025-02-11 LAB
ALBUMIN SERPL ELPH-MCNC: 3.8 G/DL — SIGNIFICANT CHANGE UP (ref 3.3–5)
ALP SERPL-CCNC: 155 U/L — HIGH (ref 40–120)
ALT FLD-CCNC: 17 U/L — SIGNIFICANT CHANGE UP (ref 4–33)
ANION GAP SERPL CALC-SCNC: 15 MMOL/L — HIGH (ref 7–14)
APTT BLD: 28.7 SEC — SIGNIFICANT CHANGE UP (ref 24.5–35.6)
AST SERPL-CCNC: 28 U/L — SIGNIFICANT CHANGE UP (ref 4–32)
BASOPHILS # BLD AUTO: 0.02 K/UL — SIGNIFICANT CHANGE UP (ref 0–0.2)
BASOPHILS NFR BLD AUTO: 0.5 % — SIGNIFICANT CHANGE UP (ref 0–2)
BILIRUB SERPL-MCNC: 0.5 MG/DL — SIGNIFICANT CHANGE UP (ref 0.2–1.2)
BUN SERPL-MCNC: 9 MG/DL — SIGNIFICANT CHANGE UP (ref 7–23)
CALCIUM SERPL-MCNC: 9 MG/DL — SIGNIFICANT CHANGE UP (ref 8.4–10.5)
CHLORIDE SERPL-SCNC: 106 MMOL/L — SIGNIFICANT CHANGE UP (ref 98–107)
CO2 SERPL-SCNC: 19 MMOL/L — LOW (ref 22–31)
CREAT SERPL-MCNC: 0.76 MG/DL — SIGNIFICANT CHANGE UP (ref 0.5–1.3)
EGFR: 80 ML/MIN/1.73M2 — SIGNIFICANT CHANGE UP
EOSINOPHIL # BLD AUTO: 0.02 K/UL — SIGNIFICANT CHANGE UP (ref 0–0.5)
EOSINOPHIL NFR BLD AUTO: 0.5 % — SIGNIFICANT CHANGE UP (ref 0–6)
GLUCOSE SERPL-MCNC: 304 MG/DL — HIGH (ref 70–99)
HCT VFR BLD CALC: 34.2 % — LOW (ref 34.5–45)
HGB BLD-MCNC: 10.9 G/DL — LOW (ref 11.5–15.5)
IANC: 2.67 K/UL — SIGNIFICANT CHANGE UP (ref 1.8–7.4)
IMM GRANULOCYTES NFR BLD AUTO: 0.2 % — SIGNIFICANT CHANGE UP (ref 0–0.9)
INR BLD: 1.08 RATIO — SIGNIFICANT CHANGE UP (ref 0.85–1.16)
LYMPHOCYTES # BLD AUTO: 1.27 K/UL — SIGNIFICANT CHANGE UP (ref 1–3.3)
LYMPHOCYTES # BLD AUTO: 30.1 % — SIGNIFICANT CHANGE UP (ref 13–44)
MCHC RBC-ENTMCNC: 23.6 PG — LOW (ref 27–34)
MCHC RBC-ENTMCNC: 31.9 G/DL — LOW (ref 32–36)
MCV RBC AUTO: 74 FL — LOW (ref 80–100)
MONOCYTES # BLD AUTO: 0.23 K/UL — SIGNIFICANT CHANGE UP (ref 0–0.9)
MONOCYTES NFR BLD AUTO: 5.5 % — SIGNIFICANT CHANGE UP (ref 2–14)
NEUTROPHILS # BLD AUTO: 2.67 K/UL — SIGNIFICANT CHANGE UP (ref 1.8–7.4)
NEUTROPHILS NFR BLD AUTO: 63.2 % — SIGNIFICANT CHANGE UP (ref 43–77)
NRBC # BLD AUTO: 0 K/UL — SIGNIFICANT CHANGE UP (ref 0–0)
NRBC # FLD: 0 K/UL — SIGNIFICANT CHANGE UP (ref 0–0)
NRBC BLD AUTO-RTO: 0 /100 WBCS — SIGNIFICANT CHANGE UP (ref 0–0)
PLATELET # BLD AUTO: 220 K/UL — SIGNIFICANT CHANGE UP (ref 150–400)
POTASSIUM SERPL-MCNC: 4.3 MMOL/L — SIGNIFICANT CHANGE UP (ref 3.5–5.3)
POTASSIUM SERPL-SCNC: 4.3 MMOL/L — SIGNIFICANT CHANGE UP (ref 3.5–5.3)
PROT SERPL-MCNC: 7.3 G/DL — SIGNIFICANT CHANGE UP (ref 6–8.3)
PROTHROM AB SERPL-ACNC: 12.8 SEC — SIGNIFICANT CHANGE UP (ref 9.9–13.4)
RBC # BLD: 4.62 M/UL — SIGNIFICANT CHANGE UP (ref 3.8–5.2)
RBC # FLD: 14.5 % — SIGNIFICANT CHANGE UP (ref 10.3–14.5)
SODIUM SERPL-SCNC: 140 MMOL/L — SIGNIFICANT CHANGE UP (ref 135–145)
TROPONIN T, HIGH SENSITIVITY RESULT: 7 NG/L — SIGNIFICANT CHANGE UP
WBC # BLD: 4.22 K/UL — SIGNIFICANT CHANGE UP (ref 3.8–10.5)
WBC # FLD AUTO: 4.22 K/UL — SIGNIFICANT CHANGE UP (ref 3.8–10.5)

## 2025-02-11 PROCEDURE — 70486 CT MAXILLOFACIAL W/O DYE: CPT | Mod: 26

## 2025-02-11 PROCEDURE — 99285 EMERGENCY DEPT VISIT HI MDM: CPT

## 2025-02-11 PROCEDURE — 73060 X-RAY EXAM OF HUMERUS: CPT | Mod: 26,RT

## 2025-02-11 PROCEDURE — 73521 X-RAY EXAM HIPS BI 2 VIEWS: CPT | Mod: 26

## 2025-02-11 PROCEDURE — 70450 CT HEAD/BRAIN W/O DYE: CPT | Mod: 26

## 2025-02-11 PROCEDURE — 71046 X-RAY EXAM CHEST 2 VIEWS: CPT | Mod: 26

## 2025-02-11 PROCEDURE — 72125 CT NECK SPINE W/O DYE: CPT | Mod: 26

## 2025-02-11 RX ORDER — MECLIZINE HCL 12.5 MG
25 TABLET ORAL ONCE
Refills: 0 | Status: COMPLETED | OUTPATIENT
Start: 2025-02-11 | End: 2025-02-11

## 2025-02-11 RX ADMIN — Medication 25 MILLIGRAM(S): at 22:34

## 2025-02-11 RX ADMIN — Medication 1000 MILLILITER(S): at 23:28

## 2025-02-11 NOTE — ED PROVIDER NOTE - PROGRESS NOTE DETAILS
MICHAEL Lima: received sign out from MICHAEL Ramirez. Patient seen resting comfortably and in NAD.. VSS. CT's with no acute findings. Xray with no acute fx. Discuss with patient admission for PT eval for frequent falls, syncope workup and further evaluation. Patient agrees with plan. Patient to be admitted for further evaluation.

## 2025-02-11 NOTE — ED ADULT NURSE NOTE - NSFALLHARMRISKINTERV_ED_ALL_ED
Assistance OOB with selected safe patient handling equipment if applicable/Assistance with ambulation/Communicate risk of Fall with Harm to all staff, patient, and family/Monitor gait and stability/Orthostatic vital signs/Provide patient with walking aids/Provide visual cue: red socks, yellow wristband, yellow gown, etc/Reinforce activity limits and safety measures with patient and family/Bed in lowest position, wheels locked, appropriate side rails in place/Call bell, personal items and telephone in reach/Instruct patient to call for assistance before getting out of bed/chair/stretcher/Non-slip footwear applied when patient is off stretcher/[place] to call system/Physically safe environment - no spills, clutter or unnecessary equipment/Purposeful Proactive Rounding/Room/bathroom lighting operational, light cord in reach

## 2025-02-11 NOTE — ED PROVIDER NOTE - RESPIRATORY CHEST EXAM
right lateral lower rib w/ tenderness, no erythema, no edema, no ecchymosis, no deformity, no crepitus

## 2025-02-11 NOTE — ED ADULT NURSE NOTE - OBJECTIVE STATEMENT
Patient received in FT, A&Ox3 ambulatory at baseline pmh: DM, HTN, HLD, anemia, vertigo presenting to ED c/o vertigo with fall and head injury this am. Pt states she got up from bed, felt dizzy and fell on the floor and passed out. Pt was found by son in bedroom. Pt states she also had a fall last week when she was dizzy; states she ran out of her meclizine last month. C/o pain to right eyebrow area, neck, right lower rib area. Denies CP, SOB, n/v/d fever chills, abdominal pain, visual changes, urinary symptoms. Labs drawn and sent.

## 2025-02-11 NOTE — ED ADULT NURSE NOTE - CAS EDN DISCHARGE INTERVENTIONS
previous HTN urgency . negative for chest pain, palpitations.  GASTROINTESTINAL: IBS. GERD. negative for nausea, vomiting, diarrhea, constipation, change in bowel habits, abdominal pain   GENITOURINARY: negative for difficulty of urination, burning with urination, frequency   INTEGUMENT: negative for rash, skin lesions, easy bruising   HEMATOLOGIC/LYMPHATIC: negative for swelling/edema   ALLERGIC/IMMUNOLOGIC: negative for urticaria , itching  ENDOCRINE: Diabetes. negative increase in drinking, increase in urination, hot or cold intolerance  MUSCULOSKELETAL: See HPI  NEUROLOGICAL: Seizure x1 . Migraines. Restless leg syndrome. Neuropathy.- follows with Dr. Johnson. negative for dizziness, lightheadedness  BEHAVIOR/PSYCH: Anxiety. Depression. Bipolar I Disorder.     Physical Exam:   BP (!) 145/95   Pulse 68   Temp 97.2 °F (36.2 °C)   Resp 16   Ht 1.6 m (5' 3\")   Wt 111.1 kg (245 lb)   LMP 2011   SpO2 94%   BMI 43.40 kg/m²   Patient's last menstrual period was 2011.    Recent Labs     05/15/24  0853   POCGLU 107*       General Appearance:  alert, well appearing, and in no acute distress  Mental status: oriented to person, place, and time with normal affect  Head:  normocephalic, atraumatic.  Eye: no icterus, redness, pupils equal and reactive, extraocular eye movements intact, conjunctiva clear  Ear: normal external ear, no discharge, hearing intact  Nose:  no drainage noted  Mouth: mucous membranes moist  Neck: supple, no carotid bruits, thyroid not palpable  Lungs: Bilateral equal air entry, clear to ausculation, no wheezing, rales or rhonchi, normal effort  Cardiovascular: HR 68 normal rate, regular rhythm, no murmur, gallop, rub.  Abdomen: Obese. Soft, nontender, nondistended, normal bowel sounds  Neurologic: There are no new focal motor or sensory deficits, normal muscle tone and bulk, no abnormal sensation, normal speech, cranial nerves II through XII grossly intact  Skin: No  IV intact

## 2025-02-11 NOTE — ED ADULT TRIAGE NOTE - CHIEF COMPLAINT QUOTE
Pt complains of vertigo x 1 week. Pt had fall today, hitting head, + LOC. Denies AC use. History of DM2, HTN, vertigo.

## 2025-02-11 NOTE — ED PROVIDER NOTE - ATTENDING APP SHARED VISIT CONTRIBUTION OF CARE
Patient was presented to me after workup and decision to admit.    Accordingly, patient is a 80-year-old female with a history of type 2 diabetes mellitus, hypertension, hyperlipidemia, anemia, vertigo who presented with complaint of fall and head injury.  Patient states that she got out of bed and fell.  She reports that she has had at least 4 falls in the past few days.  She states that she has constant, persistent vertigo.  She reports pain and swelling to the right side of her head.  Denies chest pain or shortness of breath.  She also reports pain to the right side of her chest.  History obtained by MICHAEL Mann, per discussion with patient's son, is that patient fell yesterday, was taken to outside hospital and discharge.  Today, patient was found on the floor.  Son feels that patient needs home health care as she has been falling persistently.  Patient was seen and examined.  She denies any chest pain or shortness of breath.  No fevers or chills.  She has no active dizziness at this time.      VS noted  Gen. no acute distress, Non toxic   HEENT: EOMI, mmm, nystagmus on right gaze, swelling noted to right periorbital area, TTP   spine: Mild midline C-spine tenderness, no T or L-spine tenderness, no ecchymosis, no skin changes  Lungs: CTAB/L no C/ W /R   CVS: RRR   Abd; Soft non tender, non distended    was: Stable, no TTP to bilateral hips  Ext: no edema  Skin: no rash  Neuro  awake, alert, face symmetrical,  strength is 5/5 in upper extremities, lower extremities clear speech  a/p:  s/p fall–patient has had multiple falls in the past few days.  Patient attributes this to persistent vertigo.  She is unsure of her last cardiac workup.  Is unclear if patient had episode of syncope today.  Per son, he has concerns about patient being home alone when he is not there.   CT negative for acute etiology.  Patient likely needs PT eval, INDU eval, tele monitoring and echo, evaluation for home health care.  - Selina GARAY

## 2025-02-11 NOTE — ED PROVIDER NOTE - CLINICAL SUMMARY MEDICAL DECISION MAKING FREE TEXT BOX
81 yo F, with PMH of DM, HTN, HLD, anemia, vertigo, BIBEMS c/o vertigo w/ fall and head injury this morning. well appearing female. afebrile. +LOC. Pt is ambulatory. there is no focal neuro deficits. not on anticoagulation. Fall likely 2/2 dizziness/vertigo. Plan: labs, trop, CXR to r/o fx, CT head and CT   c spine to r/o acute pathology, XR R humerus to r/o fx, XR hip/pelvis, IVF for hydration, Meclizine for vertigo, and reassessment, likely admission for recurrent fall from vertigo. 79 yo F, with PMH of DM, HTN, HLD, anemia, vertigo, BIBEMS c/o vertigo w/ fall and head injury this morning. well appearing female. afebrile. +LOC. Pt is ambulatory. there is no focal neuro deficits. not on anticoagulation. Fall likely 2/2 dizziness/vertigo. likely peripheral vertigo. lower suspicion for ACS. Plan: labs, trop, CXR to r/o fx, CT head and CT   c spine to r/o acute pathology, XR R humerus to r/o fx, XR hip/pelvis, IVF for hydration, Meclizine for vertigo, and reassessment, likely admission for recurrent fall from vertigo. 79 yo F, with PMH of DM, HTN, HLD, anemia, vertigo, BIBEMS c/o vertigo w/ fall and head injury this morning. well appearing female. afebrile. +LOC. Pt is ambulatory. there is no focal neuro deficits. not on anticoagulation. Fall likely 2/2 dizziness/vertigo. likely peripheral vertigo. lower suspicion for ACS. Plan: labs, trop, CXR to r/o fx, CT head and CT C- spine to r/o acute pathology, XR R humerus to r/o fx, XR hip/pelvis, IVF for hydration, Meclizine for vertigo, and reassessment, likely admission for recurrent fall from vertigo.

## 2025-02-11 NOTE — ED PROVIDER NOTE - NS CPE EDP MUSC CERVICAL LOC
+point midline spine tenderness, +b/l paraspinal tenderness, no erythema, no edema, no obvious deformity b/l.

## 2025-02-11 NOTE — ED PROVIDER NOTE - OBJECTIVE STATEMENT
81 yo F, with PMH of DM, HTN, HLD, anemia, vertigo, BIBEMS c/o vertigo w/ fall and head injury this morning. Pt states she was having dizziness, previous vertigo for the past week. Reports she got up from bed, felt dizzy and fell on the floor and passed out, found by son in the bedroom. States she felt last week Thursday, from dizziness, w/ right arm bruise. Admits pain to right eyebrow from today's fall, no visual disturbances, no eye pain, no double vision. Admits neck pain and right lower rib pain. States she ran out of Meclizine last month. Denies any fever, chills, n/v/d/c, weakness, numbness, abd pain, anticoagulation, back pain, or any other complaints. 79 yo F, with PMH of DM, HTN, HLD, anemia, vertigo, BIBEMS c/o vertigo w/ fall and head injury this morning with LOC. Pt states she was having dizziness, previous vertigo for the past week. Reports she got up from bed, felt dizzy and fell on the floor and passed out, found by son in the bedroom. States she felt last week Thursday, from dizziness, w/ right arm bruise; similar to her past vertigo. Admits pain to right eyebrow from today's fall, no visual disturbances, no eye pain, no double vision. Admits neck pain and right lower rib pain. States she ran out of Meclizine last month. Per son, patient fell yesterday, found patient on the floor, unconscious; patient was seen at [facility], given IVF , but no CT scans. Denies any fever, chills, n/v/d/c, weakness, numbness, abd pain, anticoagulation, back pain, or any other complaints.

## 2025-02-12 ENCOUNTER — RESULT REVIEW (OUTPATIENT)
Age: 80
End: 2025-02-12

## 2025-02-12 DIAGNOSIS — E11.9 TYPE 2 DIABETES MELLITUS WITHOUT COMPLICATIONS: ICD-10-CM

## 2025-02-12 DIAGNOSIS — R42 DIZZINESS AND GIDDINESS: ICD-10-CM

## 2025-02-12 DIAGNOSIS — D50.9 IRON DEFICIENCY ANEMIA, UNSPECIFIED: ICD-10-CM

## 2025-02-12 DIAGNOSIS — Z29.9 ENCOUNTER FOR PROPHYLACTIC MEASURES, UNSPECIFIED: ICD-10-CM

## 2025-02-12 DIAGNOSIS — I10 ESSENTIAL (PRIMARY) HYPERTENSION: ICD-10-CM

## 2025-02-12 DIAGNOSIS — R55 SYNCOPE AND COLLAPSE: ICD-10-CM

## 2025-02-12 LAB
A1C WITH ESTIMATED AVERAGE GLUCOSE RESULT: 9.6 % — HIGH (ref 4–5.6)
ADD ON TEST-SPECIMEN IN LAB: SIGNIFICANT CHANGE UP
ANION GAP SERPL CALC-SCNC: 14 MMOL/L — SIGNIFICANT CHANGE UP (ref 7–14)
BUN SERPL-MCNC: 8 MG/DL — SIGNIFICANT CHANGE UP (ref 7–23)
CALCIUM SERPL-MCNC: 8.4 MG/DL — SIGNIFICANT CHANGE UP (ref 8.4–10.5)
CHLORIDE SERPL-SCNC: 108 MMOL/L — HIGH (ref 98–107)
CK SERPL-CCNC: 164 U/L — SIGNIFICANT CHANGE UP (ref 25–170)
CO2 SERPL-SCNC: 21 MMOL/L — LOW (ref 22–31)
CREAT SERPL-MCNC: 0.68 MG/DL — SIGNIFICANT CHANGE UP (ref 0.5–1.3)
EGFR: 89 ML/MIN/1.73M2 — SIGNIFICANT CHANGE UP
ESTIMATED AVERAGE GLUCOSE: 229 — SIGNIFICANT CHANGE UP
GLUCOSE BLDC GLUCOMTR-MCNC: 209 MG/DL — HIGH (ref 70–99)
GLUCOSE BLDC GLUCOMTR-MCNC: 223 MG/DL — HIGH (ref 70–99)
GLUCOSE BLDC GLUCOMTR-MCNC: 228 MG/DL — HIGH (ref 70–99)
GLUCOSE BLDC GLUCOMTR-MCNC: 229 MG/DL — HIGH (ref 70–99)
GLUCOSE BLDC GLUCOMTR-MCNC: 261 MG/DL — HIGH (ref 70–99)
GLUCOSE SERPL-MCNC: 224 MG/DL — HIGH (ref 70–99)
HCT VFR BLD CALC: 30.2 % — LOW (ref 34.5–45)
HGB BLD-MCNC: 9.8 G/DL — LOW (ref 11.5–15.5)
MAGNESIUM SERPL-MCNC: 1.9 MG/DL — SIGNIFICANT CHANGE UP (ref 1.6–2.6)
MCHC RBC-ENTMCNC: 23.8 PG — LOW (ref 27–34)
MCHC RBC-ENTMCNC: 32.5 G/DL — SIGNIFICANT CHANGE UP (ref 32–36)
MCV RBC AUTO: 73.5 FL — LOW (ref 80–100)
NRBC # BLD AUTO: 0 K/UL — SIGNIFICANT CHANGE UP (ref 0–0)
NRBC # FLD: 0 K/UL — SIGNIFICANT CHANGE UP (ref 0–0)
NRBC BLD AUTO-RTO: 0 /100 WBCS — SIGNIFICANT CHANGE UP (ref 0–0)
PHOSPHATE SERPL-MCNC: 1.8 MG/DL — LOW (ref 2.5–4.5)
PLATELET # BLD AUTO: 189 K/UL — SIGNIFICANT CHANGE UP (ref 150–400)
POTASSIUM SERPL-MCNC: 3.7 MMOL/L — SIGNIFICANT CHANGE UP (ref 3.5–5.3)
POTASSIUM SERPL-SCNC: 3.7 MMOL/L — SIGNIFICANT CHANGE UP (ref 3.5–5.3)
RBC # BLD: 4.11 M/UL — SIGNIFICANT CHANGE UP (ref 3.8–5.2)
RBC # FLD: 14.5 % — SIGNIFICANT CHANGE UP (ref 10.3–14.5)
SODIUM SERPL-SCNC: 143 MMOL/L — SIGNIFICANT CHANGE UP (ref 135–145)
WBC # BLD: 3.25 K/UL — LOW (ref 3.8–10.5)
WBC # FLD AUTO: 3.25 K/UL — LOW (ref 3.8–10.5)

## 2025-02-12 PROCEDURE — 93306 TTE W/DOPPLER COMPLETE: CPT | Mod: 26

## 2025-02-12 PROCEDURE — 99223 1ST HOSP IP/OBS HIGH 75: CPT

## 2025-02-12 RX ORDER — DEXTROSE 50 % IN WATER 50 %
25 SYRINGE (ML) INTRAVENOUS ONCE
Refills: 0 | Status: DISCONTINUED | OUTPATIENT
Start: 2025-02-12 | End: 2025-02-18

## 2025-02-12 RX ORDER — MELATONIN 5 MG
3 TABLET ORAL AT BEDTIME
Refills: 0 | Status: DISCONTINUED | OUTPATIENT
Start: 2025-02-12 | End: 2025-02-18

## 2025-02-12 RX ORDER — GLUCAGON 3 MG/1
1 POWDER NASAL ONCE
Refills: 0 | Status: DISCONTINUED | OUTPATIENT
Start: 2025-02-12 | End: 2025-02-18

## 2025-02-12 RX ORDER — AMLODIPINE BESYLATE 10 MG/1
5 TABLET ORAL DAILY
Refills: 0 | Status: DISCONTINUED | OUTPATIENT
Start: 2025-02-12 | End: 2025-02-18

## 2025-02-12 RX ORDER — MAGNESIUM, ALUMINUM HYDROXIDE 200-200 MG
30 TABLET,CHEWABLE ORAL EVERY 4 HOURS
Refills: 0 | Status: DISCONTINUED | OUTPATIENT
Start: 2025-02-12 | End: 2025-02-18

## 2025-02-12 RX ORDER — INSULIN LISPRO 100 U/ML
INJECTION, SOLUTION INTRAVENOUS; SUBCUTANEOUS
Refills: 0 | Status: DISCONTINUED | OUTPATIENT
Start: 2025-02-12 | End: 2025-02-18

## 2025-02-12 RX ORDER — INSULIN LISPRO 100 U/ML
INJECTION, SOLUTION INTRAVENOUS; SUBCUTANEOUS AT BEDTIME
Refills: 0 | Status: DISCONTINUED | OUTPATIENT
Start: 2025-02-12 | End: 2025-02-18

## 2025-02-12 RX ORDER — DEXTROSE 50 % IN WATER 50 %
12.5 SYRINGE (ML) INTRAVENOUS ONCE
Refills: 0 | Status: DISCONTINUED | OUTPATIENT
Start: 2025-02-12 | End: 2025-02-18

## 2025-02-12 RX ORDER — ENOXAPARIN SODIUM 100 MG/ML
40 INJECTION SUBCUTANEOUS EVERY 24 HOURS
Refills: 0 | Status: DISCONTINUED | OUTPATIENT
Start: 2025-02-12 | End: 2025-02-18

## 2025-02-12 RX ORDER — SODIUM CHLORIDE 9 G/1000ML
1000 INJECTION, SOLUTION INTRAVENOUS
Refills: 0 | Status: DISCONTINUED | OUTPATIENT
Start: 2025-02-12 | End: 2025-02-18

## 2025-02-12 RX ORDER — ACETAMINOPHEN 500 MG/5ML
650 LIQUID (ML) ORAL EVERY 6 HOURS
Refills: 0 | Status: DISCONTINUED | OUTPATIENT
Start: 2025-02-12 | End: 2025-02-18

## 2025-02-12 RX ORDER — INSULIN GLARGINE-YFGN 100 [IU]/ML
18 INJECTION, SOLUTION SUBCUTANEOUS AT BEDTIME
Refills: 0 | Status: DISCONTINUED | OUTPATIENT
Start: 2025-02-12 | End: 2025-02-18

## 2025-02-12 RX ORDER — MECLIZINE HCL 12.5 MG
25 TABLET ORAL THREE TIMES A DAY
Refills: 0 | Status: DISCONTINUED | OUTPATIENT
Start: 2025-02-12 | End: 2025-02-18

## 2025-02-12 RX ORDER — ONDANSETRON HCL/PF 4 MG/2 ML
4 VIAL (ML) INJECTION EVERY 8 HOURS
Refills: 0 | Status: DISCONTINUED | OUTPATIENT
Start: 2025-02-12 | End: 2025-02-18

## 2025-02-12 RX ORDER — INFLUENZA A VIRUS A/IDAHO/07/2018 (H1N1) ANTIGEN (MDCK CELL DERIVED, PROPIOLACTONE INACTIVATED, INFLUENZA A VIRUS A/INDIANA/08/2018 (H3N2) ANTIGEN (MDCK CELL DERIVED, PROPIOLACTONE INACTIVATED), INFLUENZA B VIRUS B/SINGAPORE/INFTT-16-0610/2016 ANTIGEN (MDCK CELL DERIVED, PROPIOLACTONE INACTIVATED), INFLUENZA B VIRUS B/IOWA/06/2017 ANTIGEN (MDCK CELL DERIVED, PROPIOLACTONE INACTIVATED) 15; 15; 15; 15 UG/.5ML; UG/.5ML; UG/.5ML; UG/.5ML
0.5 INJECTION, SUSPENSION INTRAMUSCULAR ONCE
Refills: 0 | Status: DISCONTINUED | OUTPATIENT
Start: 2025-02-12 | End: 2025-02-18

## 2025-02-12 RX ORDER — POTASSIUM PHOSPHATE, MONOBASIC POTASSIUM PHOSPHATE, DIBASIC INJECTION, 236; 224 MG/ML; MG/ML
30 SOLUTION, CONCENTRATE INTRAVENOUS ONCE
Refills: 0 | Status: COMPLETED | OUTPATIENT
Start: 2025-02-12 | End: 2025-02-12

## 2025-02-12 RX ORDER — DEXTROSE 50 % IN WATER 50 %
15 SYRINGE (ML) INTRAVENOUS ONCE
Refills: 0 | Status: DISCONTINUED | OUTPATIENT
Start: 2025-02-12 | End: 2025-02-18

## 2025-02-12 RX ADMIN — INSULIN LISPRO 2: 100 INJECTION, SOLUTION INTRAVENOUS; SUBCUTANEOUS at 18:01

## 2025-02-12 RX ADMIN — POTASSIUM PHOSPHATE, MONOBASIC POTASSIUM PHOSPHATE, DIBASIC INJECTION, 83.33 MILLIMOLE(S): 236; 224 SOLUTION, CONCENTRATE INTRAVENOUS at 13:27

## 2025-02-12 RX ADMIN — ENOXAPARIN SODIUM 40 MILLIGRAM(S): 100 INJECTION SUBCUTANEOUS at 13:24

## 2025-02-12 RX ADMIN — Medication 40 MILLIEQUIVALENT(S): at 13:25

## 2025-02-12 RX ADMIN — INSULIN LISPRO 2: 100 INJECTION, SOLUTION INTRAVENOUS; SUBCUTANEOUS at 09:09

## 2025-02-12 RX ADMIN — INSULIN GLARGINE-YFGN 18 UNIT(S): 100 INJECTION, SOLUTION SUBCUTANEOUS at 22:29

## 2025-02-12 RX ADMIN — INSULIN LISPRO 2: 100 INJECTION, SOLUTION INTRAVENOUS; SUBCUTANEOUS at 13:30

## 2025-02-12 NOTE — H&P ADULT - TIME BILLING
Review of laboratory data, radiology results, consultants' recommendations, documentation in [place], discussion with patient/house staff and interdisciplinary staff (such as , social workers, etc). Interventions were performed as documented above.

## 2025-02-12 NOTE — H&P ADULT - PROBLEM SELECTOR PLAN 1
-pt found down on the floor, does not recall how long she was down. F/u CPK level  -endorsed lightheadedness, not vertigo, prior to episode  -obtain orthostatics  -EKG NSR  -TTE ordered  -monitor on telemetry  -CTH, maxillofacial, and cervical spine without evidence of acute injury

## 2025-02-12 NOTE — H&P ADULT - NSHPPHYSICALEXAM_GEN_ALL_CORE
Vital Signs Last 24 Hrs  T(C): 36.3 (02-12-25 @ 04:36), Max: 36.7 (02-11-25 @ 18:30)  T(F): 97.3 (02-12-25 @ 04:36), Max: 98.1 (02-11-25 @ 18:30)  HR: 78 (02-12-25 @ 04:36) (78 - 88)  BP: 156/73 (02-12-25 @ 04:36) (156/73 - 177/99)  BP(mean): 124 (02-11-25 @ 23:55) (124 - 124)  RR: 18 (02-12-25 @ 04:36) (16 - 18)  SpO2: 98% (02-12-25 @ 04:36) (98% - 100%)    General: WN/WD NAD  Neurology:  R eye ptosis nonfocal, ESTRADA x 4  Eyes: PERRLA/ EOMI, Gross vision intact  ENT/Neck: Neck supple, trachea midline, No JVD, Gross hearing intact.   Respiratory: CTA B/L, No wheezing, rales, rhonchi  CV: RRR, S1S2, no murmurs, rubs or gallops. No LE edema  Abdominal: Soft, NT, ND +BS,   Extremities:  + peripheral pulses  Skin: No Rashes, Hematoma, Ecchymosis

## 2025-02-12 NOTE — H&P ADULT - NSHPLABSRESULTS_GEN_ALL_CORE
.                        9.8    3.25  )-----------( 189      ( 12 Feb 2025 10:00 )             30.2     Hgb Trend: 9.8<--, 10.9<--  02-11    140  |  106  |  9   ----------------------------<  304[H]  4.3   |  19[L]  |  0.76    Ca    9.0      11 Feb 2025 22:00    TPro  7.3  /  Alb  3.8  /  TBili  0.5  /  DBili  x   /  AST  28  /  ALT  17  /  AlkPhos  155[H]  02-11    Creatinine Trend: 0.76<--  PT/INR - ( 11 Feb 2025 22:00 )   PT: 12.8 sec;   INR: 1.08 ratio         PTT - ( 11 Feb 2025 22:00 )  PTT:28.7 sec  Urinalysis Basic - ( 11 Feb 2025 22:00 )    Color: x / Appearance: x / SG: x / pH: x  Gluc: 304 mg/dL / Ketone: x  / Bili: x / Urobili: x   Blood: x / Protein: x / Nitrite: x   Leuk Esterase: x / RBC: x / WBC x   Sq Epi: x / Non Sq Epi: x / Bacteria: x      IMPRESSION:    CT HEAD:  No acute intracranial hemorrhage, mass effect, or acute osseous fracture.    CT MAXILLOFACIAL:  No acute maxillofacial bone or mandibular fracture.    CT CERVICAL SPINE:  No acute cervical spine fracture or evidence of traumatic malalignment.   Cervical spondylosis.    CXR  IMPRESSION:  No acute traumatic findings.

## 2025-02-12 NOTE — H&P ADULT - HISTORY OF PRESENT ILLNESS
80 y.o. F with PMH of T2DM, HTN, HLD, vertigo who presents with syncopal episode on 2/10. Pt states she had been feeling dizzy for the past week. Of note, pt has a documented hx of vertigo, but to me, she denies room spinning or swaying; instead, she describes her dizziness more as lightheadedness. As pt was getting out of bed, she felt dizzy and feel on the floor. She does not know how long she was down. Pt's son found her and called 911. Pt was evaluated at [facility]; she was given IVF but no CT scans. Of note, pt has been experiencing frequent falls over the past few weeks. Falls occur in the setting of feeling dizzy. Pt denies fevers, chills, N/V/D, dysuria, hematuria, CP, palpitations, SOB.     Pt currently denies any dizziness or lightheadedness. She reports some pain over her R side that developed after falling. She has ptosis of her R eye; pt was unable to tell me if this finding is typical for her.     In the ED, pt obtained CTH, maxillofacial, and cervical spine -- all had negative findings. CXR did not show evidence of fractures.

## 2025-02-12 NOTE — H&P ADULT - PROBLEM SELECTOR PLAN 3
-prescribed metformin 500 mg BID at home, but pt states not taking. Will need to have further discussion re improved DM control  -ISS ac and hs  -A1C 9.6 -prescribed metformin 500 mg BID at home, but pt states not taking  -pt told ACP she takes 36 u Lantus qhs, but [facility], pt [facility]'t [facility] in months. Will decrease dose to 18 u qhs  -ISS ac and hs  -A1C 9.6

## 2025-02-12 NOTE — ED ADULT NURSE REASSESSMENT NOTE - NS ED NURSE REASSESS COMMENT FT1
Pt admitted and waiting for transport. Pt finger stick was [phone] ACP was contact and are aware, awaiting further orders. Pt is currently asymptomatic.

## 2025-02-12 NOTE — PATIENT PROFILE ADULT - DO YOU EVER NEED HELP READING HOSPITAL MATERIALS?
After back and forth with , apparently they have Neuromuscular specialist within their neurology department. We were able to get him in with Dr. Ignacio Bone but it won't be until August 2, 2017 @ 1pm.   no

## 2025-02-12 NOTE — PATIENT PROFILE ADULT - FALL HARM RISK - FALLEN IN PAST
Patient last saw PCP on 5/8/18; BP at that time: 130/87  No future appointments scheduled     Routed to PCP-schedule for BP check?    Accidental fall

## 2025-02-13 ENCOUNTER — RESULT REVIEW (OUTPATIENT)
Age: 80
End: 2025-02-13

## 2025-02-13 LAB
FERRITIN SERPL-MCNC: 57 NG/ML — SIGNIFICANT CHANGE UP (ref 13–330)
GLUCOSE BLDC GLUCOMTR-MCNC: 204 MG/DL — HIGH (ref 70–99)
GLUCOSE BLDC GLUCOMTR-MCNC: 210 MG/DL — HIGH (ref 70–99)
GLUCOSE BLDC GLUCOMTR-MCNC: 218 MG/DL — HIGH (ref 70–99)
GLUCOSE BLDC GLUCOMTR-MCNC: 280 MG/DL — HIGH (ref 70–99)
IRON SATN MFR SERPL: 33 % — SIGNIFICANT CHANGE UP (ref 14–50)
IRON SATN MFR SERPL: 69 UG/DL — SIGNIFICANT CHANGE UP (ref 30–160)
TIBC SERPL-MCNC: 211 UG/DL — LOW (ref 220–430)
TRANSFERRIN SERPL-MCNC: 196 MG/DL — LOW (ref 200–360)
UIBC SERPL-MCNC: 142 UG/DL — SIGNIFICANT CHANGE UP (ref 110–370)

## 2025-02-13 PROCEDURE — 99233 SBSQ HOSP IP/OBS HIGH 50: CPT

## 2025-02-13 PROCEDURE — 93880 EXTRACRANIAL BILAT STUDY: CPT | Mod: 26

## 2025-02-13 RX ADMIN — INSULIN LISPRO 3: 100 INJECTION, SOLUTION INTRAVENOUS; SUBCUTANEOUS at 12:06

## 2025-02-13 RX ADMIN — INSULIN GLARGINE-YFGN 18 UNIT(S): 100 INJECTION, SOLUTION SUBCUTANEOUS at 22:15

## 2025-02-13 RX ADMIN — INSULIN LISPRO 2: 100 INJECTION, SOLUTION INTRAVENOUS; SUBCUTANEOUS at 08:53

## 2025-02-13 RX ADMIN — ENOXAPARIN SODIUM 40 MILLIGRAM(S): 100 INJECTION SUBCUTANEOUS at 12:05

## 2025-02-13 RX ADMIN — AMLODIPINE BESYLATE 5 MILLIGRAM(S): 10 TABLET ORAL at 05:12

## 2025-02-13 RX ADMIN — Medication 75 MILLILITER(S): at 12:19

## 2025-02-13 RX ADMIN — INSULIN LISPRO 2: 100 INJECTION, SOLUTION INTRAVENOUS; SUBCUTANEOUS at 17:35

## 2025-02-13 NOTE — PHYSICAL THERAPY INITIAL EVALUATION ADULT - GENERAL OBSERVATIONS, REHAB EVAL
Pt found semi reclined in bed; HR 78bpm; right eye ptosis noted and as per chart, unclear if chronic; spoke with JERI Katz, who advised patient may participate.

## 2025-02-13 NOTE — PHYSICAL THERAPY INITIAL EVALUATION ADULT - PERTINENT HX OF CURRENT PROBLEM, REHAB EVAL
As per chart, patient is a 80 year old female with PMH of T2DM, HTN, HLD, vertigo who presents with syncopal episode on 2/10. Pt states she had been feeling dizzy for the past week. Of note, pt has a documented hx of vertigo, but to me, she denies room spinning or swaying; instead, she describes her dizziness more as lightheadedness. As pt was getting out of bed, she felt dizzy and feel on the floor. She does not know how long she was down. Pt's son found her and called 911. Pt was evaluated at [facility]; she was given IVF but no CT scans. Of note, pt has been experiencing frequent falls over the past few weeks. Falls occur in the setting of feeling dizzy. In the ED, pt obtained CTH, maxillofacial, and cervical spine -- all had negative findings. CXR did not show evidence of fractures.

## 2025-02-13 NOTE — PHYSICAL THERAPY INITIAL EVALUATION ADULT - PHYSICAL ASSIST/NONPHYSICAL ASSIST: SIT/SUPINE, REHAB EVAL
Pt advised on results. Med list updated. Transferred to [facility] to schedule.    verbal cues/nonverbal cues (demo/gestures)/1 person assist

## 2025-02-14 DIAGNOSIS — M25.551 PAIN IN RIGHT HIP: ICD-10-CM

## 2025-02-14 DIAGNOSIS — E87.6 HYPOKALEMIA: ICD-10-CM

## 2025-02-14 LAB
ANION GAP SERPL CALC-SCNC: 11 MMOL/L — SIGNIFICANT CHANGE UP (ref 7–14)
ANISOCYTOSIS BLD QL: SLIGHT — SIGNIFICANT CHANGE UP
BASOPHILS # BLD AUTO: 0.03 K/UL — SIGNIFICANT CHANGE UP (ref 0–0.2)
BASOPHILS NFR BLD AUTO: 0.9 % — SIGNIFICANT CHANGE UP (ref 0–2)
BUN SERPL-MCNC: 10 MG/DL — SIGNIFICANT CHANGE UP (ref 7–23)
CALCIUM SERPL-MCNC: 8.6 MG/DL — SIGNIFICANT CHANGE UP (ref 8.4–10.5)
CHLORIDE SERPL-SCNC: 106 MMOL/L — SIGNIFICANT CHANGE UP (ref 98–107)
CO2 SERPL-SCNC: 23 MMOL/L — SIGNIFICANT CHANGE UP (ref 22–31)
CREAT SERPL-MCNC: 0.72 MG/DL — SIGNIFICANT CHANGE UP (ref 0.5–1.3)
EGFR: 85 ML/MIN/1.73M2 — SIGNIFICANT CHANGE UP
EOSINOPHIL # BLD AUTO: 0.08 K/UL — SIGNIFICANT CHANGE UP (ref 0–0.5)
EOSINOPHIL NFR BLD AUTO: 2.6 % — SIGNIFICANT CHANGE UP (ref 0–6)
GLUCOSE BLDC GLUCOMTR-MCNC: 106 MG/DL — HIGH (ref 70–99)
GLUCOSE BLDC GLUCOMTR-MCNC: 122 MG/DL — HIGH (ref 70–99)
GLUCOSE BLDC GLUCOMTR-MCNC: 134 MG/DL — HIGH (ref 70–99)
GLUCOSE BLDC GLUCOMTR-MCNC: 153 MG/DL — HIGH (ref 70–99)
GLUCOSE BLDC GLUCOMTR-MCNC: 285 MG/DL — HIGH (ref 70–99)
GLUCOSE SERPL-MCNC: 161 MG/DL — HIGH (ref 70–99)
HCT VFR BLD CALC: 33.7 % — LOW (ref 34.5–45)
HGB BLD-MCNC: 10.8 G/DL — LOW (ref 11.5–15.5)
HYPOCHROMIA BLD QL: SLIGHT — SIGNIFICANT CHANGE UP
IANC: 1.01 K/UL — LOW (ref 1.8–7.4)
LYMPHOCYTES # BLD AUTO: 1.26 K/UL — SIGNIFICANT CHANGE UP (ref 1–3.3)
LYMPHOCYTES # BLD AUTO: 40 % — SIGNIFICANT CHANGE UP (ref 13–44)
MAGNESIUM SERPL-MCNC: 1.9 MG/DL — SIGNIFICANT CHANGE UP (ref 1.6–2.6)
MCHC RBC-ENTMCNC: 23.7 PG — LOW (ref 27–34)
MCHC RBC-ENTMCNC: 32 G/DL — SIGNIFICANT CHANGE UP (ref 32–36)
MCV RBC AUTO: 73.9 FL — LOW (ref 80–100)
MICROCYTES BLD QL: SLIGHT — SIGNIFICANT CHANGE UP
MONOCYTES # BLD AUTO: 0.33 K/UL — SIGNIFICANT CHANGE UP (ref 0–0.9)
MONOCYTES NFR BLD AUTO: 10.4 % — SIGNIFICANT CHANGE UP (ref 2–14)
NEUTROPHILS # BLD AUTO: 1.29 K/UL — LOW (ref 1.8–7.4)
NEUTROPHILS NFR BLD AUTO: 40.9 % — LOW (ref 43–77)
PHOSPHATE SERPL-MCNC: 2.6 MG/DL — SIGNIFICANT CHANGE UP (ref 2.5–4.5)
PLAT MORPH BLD: NORMAL — SIGNIFICANT CHANGE UP
PLATELET # BLD AUTO: 235 K/UL — SIGNIFICANT CHANGE UP (ref 150–400)
PLATELET COUNT - ESTIMATE: NORMAL — SIGNIFICANT CHANGE UP
POLYCHROMASIA BLD QL SMEAR: SLIGHT — SIGNIFICANT CHANGE UP
POTASSIUM SERPL-MCNC: 3.4 MMOL/L — LOW (ref 3.5–5.3)
POTASSIUM SERPL-SCNC: 3.4 MMOL/L — LOW (ref 3.5–5.3)
RBC # BLD: 4.56 M/UL — SIGNIFICANT CHANGE UP (ref 3.8–5.2)
RBC # FLD: 14.4 % — SIGNIFICANT CHANGE UP (ref 10.3–14.5)
RBC BLD AUTO: ABNORMAL
SODIUM SERPL-SCNC: 140 MMOL/L — SIGNIFICANT CHANGE UP (ref 135–145)
VARIANT LYMPHS # BLD: 5.2 % — SIGNIFICANT CHANGE UP (ref 0–6)
VARIANT LYMPHS NFR BLD MANUAL: 5.2 % — SIGNIFICANT CHANGE UP (ref 0–6)
WBC # BLD: 3.15 K/UL — LOW (ref 3.8–10.5)
WBC # FLD AUTO: 3.15 K/UL — LOW (ref 3.8–10.5)

## 2025-02-14 PROCEDURE — 99232 SBSQ HOSP IP/OBS MODERATE 35: CPT

## 2025-02-14 RX ORDER — OXYCODONE HYDROCHLORIDE 30 MG/1
2.5 TABLET ORAL EVERY 6 HOURS
Refills: 0 | Status: DISCONTINUED | OUTPATIENT
Start: 2025-02-14 | End: 2025-02-18

## 2025-02-14 RX ORDER — MAGNESIUM SULFATE 500 MG/ML
2 SYRINGE (ML) INJECTION ONCE
Refills: 0 | Status: COMPLETED | OUTPATIENT
Start: 2025-02-14 | End: 2025-02-14

## 2025-02-14 RX ORDER — INSULIN LISPRO 100 U/ML
4 INJECTION, SOLUTION INTRAVENOUS; SUBCUTANEOUS
Refills: 0 | Status: DISCONTINUED | OUTPATIENT
Start: 2025-02-14 | End: 2025-02-16

## 2025-02-14 RX ORDER — ISOPROPYL ALCOHOL, BENZOCAINE .7; .06 ML/ML; ML/ML
0 SWAB TOPICAL
Qty: 100 | Refills: 1
Start: 2025-02-14

## 2025-02-14 RX ADMIN — ENOXAPARIN SODIUM 40 MILLIGRAM(S): 100 INJECTION SUBCUTANEOUS at 12:45

## 2025-02-14 RX ADMIN — Medication 20 MILLIEQUIVALENT(S): at 13:55

## 2025-02-14 RX ADMIN — INSULIN LISPRO 4 UNIT(S): 100 INJECTION, SOLUTION INTRAVENOUS; SUBCUTANEOUS at 18:13

## 2025-02-14 RX ADMIN — OXYCODONE HYDROCHLORIDE 2.5 MILLIGRAM(S): 30 TABLET ORAL at 11:26

## 2025-02-14 RX ADMIN — INSULIN LISPRO 3: 100 INJECTION, SOLUTION INTRAVENOUS; SUBCUTANEOUS at 12:44

## 2025-02-14 RX ADMIN — Medication 25 GRAM(S): at 13:55

## 2025-02-14 RX ADMIN — INSULIN LISPRO 1: 100 INJECTION, SOLUTION INTRAVENOUS; SUBCUTANEOUS at 09:15

## 2025-02-14 RX ADMIN — AMLODIPINE BESYLATE 5 MILLIGRAM(S): 10 TABLET ORAL at 05:22

## 2025-02-14 RX ADMIN — INSULIN GLARGINE-YFGN 18 UNIT(S): 100 INJECTION, SOLUTION SUBCUTANEOUS at 23:19

## 2025-02-14 RX ADMIN — OXYCODONE HYDROCHLORIDE 2.5 MILLIGRAM(S): 30 TABLET ORAL at 23:24

## 2025-02-14 RX ADMIN — OXYCODONE HYDROCHLORIDE 2.5 MILLIGRAM(S): 30 TABLET ORAL at 22:24

## 2025-02-14 NOTE — PHARMACOTHERAPY INTERVENTION NOTE - COMMENTS
Discharge medications reviewed with the patient. Current medication schedule was discussed in detail including: medication name, indication, dose, administration times, treatment duration, side effects, and special instructions. Patient educated on A1c, insulin pen administration, hypoglycemia and treatment, healthy plate, exercise, and blood glucose monitoring. Patient reports she is supposed to take metformin and insulin every night but she frequently forgets. She skips her insulin dose when it's past the time she normally injects it. Discussed that she can leave her insulin pen at her bedside as a visual reminder to take it at bedtime. Patient verbalized understanding, declined return demo as she says she knows how to inject. Patient is being discharged to rehab and she says she ran out of DM supplies. D/w ACP Ainsley to [facility]. Patient aware that the rehab will have to send insulin once her dose is finalized. Reinforced adherence to medications and diet changes. Gave pt DM2 handouts (A1c, hypoglycemia & treatment, healthy plate).     Sangeeta Langford, PharmD, BCPS  Clinical Pharmacy Specialist  [phone]

## 2025-02-15 LAB
ANION GAP SERPL CALC-SCNC: 13 MMOL/L — SIGNIFICANT CHANGE UP (ref 7–14)
BUN SERPL-MCNC: 11 MG/DL — SIGNIFICANT CHANGE UP (ref 7–23)
CALCIUM SERPL-MCNC: 9 MG/DL — SIGNIFICANT CHANGE UP (ref 8.4–10.5)
CHLORIDE SERPL-SCNC: 105 MMOL/L — SIGNIFICANT CHANGE UP (ref 98–107)
CO2 SERPL-SCNC: 22 MMOL/L — SIGNIFICANT CHANGE UP (ref 22–31)
CREAT SERPL-MCNC: 0.77 MG/DL — SIGNIFICANT CHANGE UP (ref 0.5–1.3)
EGFR: 78 ML/MIN/1.73M2 — SIGNIFICANT CHANGE UP
GLUCOSE BLDC GLUCOMTR-MCNC: 112 MG/DL — HIGH (ref 70–99)
GLUCOSE BLDC GLUCOMTR-MCNC: 160 MG/DL — HIGH (ref 70–99)
GLUCOSE BLDC GLUCOMTR-MCNC: 239 MG/DL — HIGH (ref 70–99)
GLUCOSE BLDC GLUCOMTR-MCNC: 91 MG/DL — SIGNIFICANT CHANGE UP (ref 70–99)
GLUCOSE SERPL-MCNC: 112 MG/DL — HIGH (ref 70–99)
MAGNESIUM SERPL-MCNC: 2.2 MG/DL — SIGNIFICANT CHANGE UP (ref 1.6–2.6)
PHOSPHATE SERPL-MCNC: 3.4 MG/DL — SIGNIFICANT CHANGE UP (ref 2.5–4.5)
POTASSIUM SERPL-MCNC: 4 MMOL/L — SIGNIFICANT CHANGE UP (ref 3.5–5.3)
POTASSIUM SERPL-SCNC: 4 MMOL/L — SIGNIFICANT CHANGE UP (ref 3.5–5.3)
SODIUM SERPL-SCNC: 140 MMOL/L — SIGNIFICANT CHANGE UP (ref 135–145)

## 2025-02-15 PROCEDURE — 99232 SBSQ HOSP IP/OBS MODERATE 35: CPT

## 2025-02-15 RX ADMIN — INSULIN LISPRO 4 UNIT(S): 100 INJECTION, SOLUTION INTRAVENOUS; SUBCUTANEOUS at 09:23

## 2025-02-15 RX ADMIN — AMLODIPINE BESYLATE 5 MILLIGRAM(S): 10 TABLET ORAL at 06:10

## 2025-02-15 RX ADMIN — INSULIN LISPRO 4 UNIT(S): 100 INJECTION, SOLUTION INTRAVENOUS; SUBCUTANEOUS at 12:22

## 2025-02-15 RX ADMIN — INSULIN LISPRO 4 UNIT(S): 100 INJECTION, SOLUTION INTRAVENOUS; SUBCUTANEOUS at 17:46

## 2025-02-15 RX ADMIN — ENOXAPARIN SODIUM 40 MILLIGRAM(S): 100 INJECTION SUBCUTANEOUS at 12:20

## 2025-02-15 RX ADMIN — INSULIN LISPRO 2: 100 INJECTION, SOLUTION INTRAVENOUS; SUBCUTANEOUS at 12:21

## 2025-02-15 RX ADMIN — INSULIN GLARGINE-YFGN 18 UNIT(S): 100 INJECTION, SOLUTION SUBCUTANEOUS at 22:15

## 2025-02-15 NOTE — CONSULT NOTE ADULT - SUBJECTIVE AND OBJECTIVE BOX
Cardiovascular Disease Initial Evaluation  DATE OF SERVICE: 02-15-25 @ 09:43    CHIEF COMPLAINT: Dizziness    HISTORY OF PRESENT ILLNESS:  This is a 80 year old woman with T2DM, HTN, HLD, and vertigo who presented to [facility] on 2/12/2025 with a syncopal episode on 2/10.   She states she had been feeling dizzy for the past week.   She was getting out of bed, she felt dizzy and feel on the floor. She does not know how long she was down. Pt's son found her and called 911. Pt was evaluated at [facility]; she was given IVF but no CT scans. Of note, pt has been experiencing frequent falls over the past few weeks. Falls occur in the setting of feeling dizzy.       Pt currently denies any dizziness or lightheadedness.        Allergies  No Known Allergies      	    MEDICATIONS:  amLODIPine   Tablet 5 milliGRAM(s) Oral daily  enoxaparin Injectable 40 milliGRAM(s) SubCutaneous every 24 hours        acetaminophen     Tablet .. 650 milliGRAM(s) Oral every 6 hours PRN  meclizine 25 milliGRAM(s) Oral three times a day PRN  melatonin 3 milliGRAM(s) Oral at bedtime PRN  ondansetron Injectable 4 milliGRAM(s) IV Push every 8 hours PRN  oxyCODONE    IR 2.5 milliGRAM(s) Oral every 6 hours PRN    aluminum hydroxide/magnesium hydroxide/simethicone Suspension 30 milliLiter(s) Oral every 4 hours PRN    dextrose 50% Injectable 25 Gram(s) IV Push once  dextrose 50% Injectable 12.5 Gram(s) IV Push once  dextrose 50% Injectable 25 Gram(s) IV Push once  dextrose Oral Gel 15 Gram(s) Oral once PRN  glucagon  Injectable 1 milliGRAM(s) IntraMuscular once  insulin glargine Injectable (LANTUS) 18 Unit(s) SubCutaneous at bedtime  insulin lispro (ADMELOG) corrective regimen sliding scale   SubCutaneous three times a day before meals  insulin lispro (ADMELOG) corrective regimen sliding scale   SubCutaneous at bedtime  insulin lispro Injectable (ADMELOG) 4 Unit(s) SubCutaneous three times a day before meals    dextrose 5%. 1000 milliLiter(s) IV Continuous <Continuous>  dextrose 5%. 1000 milliLiter(s) IV Continuous <Continuous>  influenza  Vaccine (HIGH DOSE) 0.5 milliLiter(s) IntraMuscular once  sodium chloride 0.9%. 1000 milliLiter(s) IV Continuous <Continuous>      PAST MEDICAL & SURGICAL HISTORY:  Diabetes mellitus type II      HTN (hypertension), benign      Radius fracture  left      Hypercholesterolemia      Anemia      Headache      DM2 (diabetes mellitus, type 2)      H/O: HTN (hypertension)      Vertigo      History of cataract extraction  bilateral;  (Left 02/013 - right 03/3013)      History of wrist fracture  ORIF left wrist ; 2013      History of bunionectomy of left great toe      H/O tubal ligation  1983      Breast cyst, left  Excision of left breast cyst; 1964      S/P ORIF (open reduction internal fixation) fracture  left ankle; ~ 5-6 years ago      Radial fracture          FAMILY HISTORY:  Family history of diabetes mellitus (Sibling)        SOCIAL HISTORY:    The patient is a nonsmoker       REVIEW OF SYSTEMS:  See HPI, otherwise complete 14 point review of systems negative      PHYSICAL EXAM:  T(C): 36.3 (02-15-25 @ 04:20), Max: 36.4 (02-14-25 @ 12:35)  HR: 68 (02-15-25 @ 04:20) (68 - 78)  BP: 121/65 (02-15-25 @ 04:20) (121/65 - 127/67)  RR: 18 (02-15-25 @ 04:20) (18 - 18)  SpO2: 98% (02-15-25 @ 04:20) (97% - 98%)  Wt(kg): --  I&O's Summary      Appearance: No Acute Distress; resting comfortably  HEENT:  Normal oral mucosa, PERRL, EOMI	  Cardiovascular: Normal S1 S2, No JVD, No murmurs/rubs/gallops  Respiratory: Normal respiratory effort; Lungs clear to auscultation bilaterally  Gastrointestinal:  Soft, Non-tender, + BS	  Skin: No rashes, No ecchymoses, No cyanosis	  Neurologic: Non-focal; no weakness  Extremities: No clubbing, cyanosis or edema  Vascular: Peripheral pulses palpable 2+ bilaterally  Psychiatry: A & O x 3, Mood & affect appropriate    Laboratory Data:	 	    CBC Full  -  ( 14 Feb 2025 07:06 )  WBC Count : 3.15 K/uL  Hemoglobin : 10.8 g/dL  Hematocrit : 33.7 %  Platelet Count - Automated : 235 K/uL  Mean Cell Volume : 73.9 fL  Mean Cell Hemoglobin : 23.7 pg  Mean Cell Hemoglobin Concentration : 32.0 g/dL  Auto Neutrophil # : x  Auto Lymphocyte # : x  Auto Monocyte # : x  Auto Eosinophil # : x  Auto Basophil # : x  Auto Neutrophil % : x  Auto Lymphocyte % : x  Auto Monocyte % : x  Auto Eosinophil % : x  Auto Basophil % : x    02-15    140  |  105  |  11  ----------------------------<  112[H]  4.0   |  22  |  0.77  02-14    140  |  106  |  10  ----------------------------<  161[H]  3.4[L]   |  23  |  0.72    Ca    9.0      15 Feb 2025 06:00  Ca    8.6      14 Feb 2025 07:06  Phos  3.4     02-15  Phos  2.6     02-14  Mg     2.20     02-15  Mg     1.90     02-14      Interpretation of Telemetry: n/a	    ECG:  	Normal sinus rhythm    Assessment: 80 year old woman with T2DM, HTN, HLD, and vertigo presents with syncope.     Plan of Care:    #Syncope-  Ms. Cristobal describes a prodrome of dizziness prior to the episode.   EKG is unremarkable.  Mild inferior LV wall motion abnormality noted with normal overall ejection fraction.  Low suspicion for arrhythmogenic etiology.   Ms. Cristobal can follow up with me in the outpatient setting for Holter monitoring.  I would not start GDMT given diastolic hypotension and history of falls.       56 minutes spent on total encounter; 100% of the visit was spent counseling and/or coordinating care by the attending physician.   	  Azeem Oakes MD [facility]  Cardiovascular Diseases  [phone]

## 2025-02-16 LAB
ANION GAP SERPL CALC-SCNC: 14 MMOL/L — SIGNIFICANT CHANGE UP (ref 7–14)
BUN SERPL-MCNC: 14 MG/DL — SIGNIFICANT CHANGE UP (ref 7–23)
CALCIUM SERPL-MCNC: 8.9 MG/DL — SIGNIFICANT CHANGE UP (ref 8.4–10.5)
CHLORIDE SERPL-SCNC: 103 MMOL/L — SIGNIFICANT CHANGE UP (ref 98–107)
CO2 SERPL-SCNC: 21 MMOL/L — LOW (ref 22–31)
CREAT SERPL-MCNC: 0.79 MG/DL — SIGNIFICANT CHANGE UP (ref 0.5–1.3)
EGFR: 76 ML/MIN/1.73M2 — SIGNIFICANT CHANGE UP
GLUCOSE BLDC GLUCOMTR-MCNC: 141 MG/DL — HIGH (ref 70–99)
GLUCOSE BLDC GLUCOMTR-MCNC: 186 MG/DL — HIGH (ref 70–99)
GLUCOSE BLDC GLUCOMTR-MCNC: 190 MG/DL — HIGH (ref 70–99)
GLUCOSE BLDC GLUCOMTR-MCNC: 241 MG/DL — HIGH (ref 70–99)
GLUCOSE SERPL-MCNC: 128 MG/DL — HIGH (ref 70–99)
HCT VFR BLD CALC: 34.5 % — SIGNIFICANT CHANGE UP (ref 34.5–45)
HGB BLD-MCNC: 11 G/DL — LOW (ref 11.5–15.5)
MAGNESIUM SERPL-MCNC: 1.9 MG/DL — SIGNIFICANT CHANGE UP (ref 1.6–2.6)
MCHC RBC-ENTMCNC: 23.8 PG — LOW (ref 27–34)
MCHC RBC-ENTMCNC: 31.9 G/DL — LOW (ref 32–36)
MCV RBC AUTO: 74.5 FL — LOW (ref 80–100)
NRBC # BLD AUTO: 0 K/UL — SIGNIFICANT CHANGE UP (ref 0–0)
NRBC # FLD: 0 K/UL — SIGNIFICANT CHANGE UP (ref 0–0)
NRBC BLD AUTO-RTO: 0 /100 WBCS — SIGNIFICANT CHANGE UP (ref 0–0)
PHOSPHATE SERPL-MCNC: 3.1 MG/DL — SIGNIFICANT CHANGE UP (ref 2.5–4.5)
PLATELET # BLD AUTO: 236 K/UL — SIGNIFICANT CHANGE UP (ref 150–400)
POTASSIUM SERPL-MCNC: 3.5 MMOL/L — SIGNIFICANT CHANGE UP (ref 3.5–5.3)
POTASSIUM SERPL-SCNC: 3.5 MMOL/L — SIGNIFICANT CHANGE UP (ref 3.5–5.3)
RBC # BLD: 4.63 M/UL — SIGNIFICANT CHANGE UP (ref 3.8–5.2)
RBC # FLD: 15.1 % — HIGH (ref 10.3–14.5)
SODIUM SERPL-SCNC: 138 MMOL/L — SIGNIFICANT CHANGE UP (ref 135–145)
WBC # BLD: 3.63 K/UL — LOW (ref 3.8–10.5)
WBC # FLD AUTO: 3.63 K/UL — LOW (ref 3.8–10.5)

## 2025-02-16 PROCEDURE — 99232 SBSQ HOSP IP/OBS MODERATE 35: CPT

## 2025-02-16 RX ORDER — INSULIN LISPRO 100 U/ML
2 INJECTION, SOLUTION INTRAVENOUS; SUBCUTANEOUS
Refills: 0 | Status: DISCONTINUED | OUTPATIENT
Start: 2025-02-16 | End: 2025-02-18

## 2025-02-16 RX ADMIN — INSULIN LISPRO 2 UNIT(S): 100 INJECTION, SOLUTION INTRAVENOUS; SUBCUTANEOUS at 13:14

## 2025-02-16 RX ADMIN — INSULIN LISPRO 2: 100 INJECTION, SOLUTION INTRAVENOUS; SUBCUTANEOUS at 18:10

## 2025-02-16 RX ADMIN — OXYCODONE HYDROCHLORIDE 2.5 MILLIGRAM(S): 30 TABLET ORAL at 05:08

## 2025-02-16 RX ADMIN — INSULIN LISPRO 1: 100 INJECTION, SOLUTION INTRAVENOUS; SUBCUTANEOUS at 13:13

## 2025-02-16 RX ADMIN — ENOXAPARIN SODIUM 40 MILLIGRAM(S): 100 INJECTION SUBCUTANEOUS at 13:13

## 2025-02-16 RX ADMIN — INSULIN LISPRO 4 UNIT(S): 100 INJECTION, SOLUTION INTRAVENOUS; SUBCUTANEOUS at 09:57

## 2025-02-16 RX ADMIN — INSULIN LISPRO 2 UNIT(S): 100 INJECTION, SOLUTION INTRAVENOUS; SUBCUTANEOUS at 18:11

## 2025-02-16 RX ADMIN — AMLODIPINE BESYLATE 5 MILLIGRAM(S): 10 TABLET ORAL at 05:10

## 2025-02-16 RX ADMIN — INSULIN GLARGINE-YFGN 18 UNIT(S): 100 INJECTION, SOLUTION SUBCUTANEOUS at 21:36

## 2025-02-17 LAB
ANION GAP SERPL CALC-SCNC: 13 MMOL/L — SIGNIFICANT CHANGE UP (ref 7–14)
BUN SERPL-MCNC: 13 MG/DL — SIGNIFICANT CHANGE UP (ref 7–23)
CALCIUM SERPL-MCNC: 8.8 MG/DL — SIGNIFICANT CHANGE UP (ref 8.4–10.5)
CHLORIDE SERPL-SCNC: 106 MMOL/L — SIGNIFICANT CHANGE UP (ref 98–107)
CO2 SERPL-SCNC: 21 MMOL/L — LOW (ref 22–31)
CREAT SERPL-MCNC: 0.85 MG/DL — SIGNIFICANT CHANGE UP (ref 0.5–1.3)
EGFR: 70 ML/MIN/1.73M2 — SIGNIFICANT CHANGE UP
GLUCOSE BLDC GLUCOMTR-MCNC: 114 MG/DL — HIGH (ref 70–99)
GLUCOSE BLDC GLUCOMTR-MCNC: 168 MG/DL — HIGH (ref 70–99)
GLUCOSE BLDC GLUCOMTR-MCNC: 169 MG/DL — HIGH (ref 70–99)
GLUCOSE BLDC GLUCOMTR-MCNC: 292 MG/DL — HIGH (ref 70–99)
GLUCOSE SERPL-MCNC: 108 MG/DL — HIGH (ref 70–99)
HCT VFR BLD CALC: 35.4 % — SIGNIFICANT CHANGE UP (ref 34.5–45)
HGB BLD-MCNC: 10.9 G/DL — LOW (ref 11.5–15.5)
MAGNESIUM SERPL-MCNC: 2 MG/DL — SIGNIFICANT CHANGE UP (ref 1.6–2.6)
MCHC RBC-ENTMCNC: 23.1 PG — LOW (ref 27–34)
MCHC RBC-ENTMCNC: 30.8 G/DL — LOW (ref 32–36)
MCV RBC AUTO: 75.2 FL — LOW (ref 80–100)
NRBC # BLD AUTO: 0 K/UL — SIGNIFICANT CHANGE UP (ref 0–0)
NRBC # FLD: 0 K/UL — SIGNIFICANT CHANGE UP (ref 0–0)
NRBC BLD AUTO-RTO: 0 /100 WBCS — SIGNIFICANT CHANGE UP (ref 0–0)
PHOSPHATE SERPL-MCNC: 3.4 MG/DL — SIGNIFICANT CHANGE UP (ref 2.5–4.5)
PLATELET # BLD AUTO: 250 K/UL — SIGNIFICANT CHANGE UP (ref 150–400)
POTASSIUM SERPL-MCNC: 4 MMOL/L — SIGNIFICANT CHANGE UP (ref 3.5–5.3)
POTASSIUM SERPL-SCNC: 4 MMOL/L — SIGNIFICANT CHANGE UP (ref 3.5–5.3)
RBC # BLD: 4.71 M/UL — SIGNIFICANT CHANGE UP (ref 3.8–5.2)
RBC # FLD: 15.1 % — HIGH (ref 10.3–14.5)
SODIUM SERPL-SCNC: 140 MMOL/L — SIGNIFICANT CHANGE UP (ref 135–145)
WBC # BLD: 3.55 K/UL — LOW (ref 3.8–10.5)
WBC # FLD AUTO: 3.55 K/UL — LOW (ref 3.8–10.5)

## 2025-02-17 PROCEDURE — 99232 SBSQ HOSP IP/OBS MODERATE 35: CPT

## 2025-02-17 RX ORDER — SENNA 187 MG
2 TABLET ORAL AT BEDTIME
Refills: 0 | Status: DISCONTINUED | OUTPATIENT
Start: 2025-02-17 | End: 2025-02-18

## 2025-02-17 RX ADMIN — AMLODIPINE BESYLATE 5 MILLIGRAM(S): 10 TABLET ORAL at 06:11

## 2025-02-17 RX ADMIN — INSULIN LISPRO 2 UNIT(S): 100 INJECTION, SOLUTION INTRAVENOUS; SUBCUTANEOUS at 18:22

## 2025-02-17 RX ADMIN — INSULIN LISPRO 3: 100 INJECTION, SOLUTION INTRAVENOUS; SUBCUTANEOUS at 18:21

## 2025-02-17 RX ADMIN — Medication 650 MILLIGRAM(S): at 06:08

## 2025-02-17 RX ADMIN — OXYCODONE HYDROCHLORIDE 2.5 MILLIGRAM(S): 30 TABLET ORAL at 11:06

## 2025-02-17 RX ADMIN — INSULIN LISPRO 2 UNIT(S): 100 INJECTION, SOLUTION INTRAVENOUS; SUBCUTANEOUS at 13:07

## 2025-02-17 RX ADMIN — OXYCODONE HYDROCHLORIDE 2.5 MILLIGRAM(S): 30 TABLET ORAL at 12:06

## 2025-02-17 RX ADMIN — OXYCODONE HYDROCHLORIDE 2.5 MILLIGRAM(S): 30 TABLET ORAL at 22:32

## 2025-02-17 RX ADMIN — OXYCODONE HYDROCHLORIDE 2.5 MILLIGRAM(S): 30 TABLET ORAL at 02:02

## 2025-02-17 RX ADMIN — OXYCODONE HYDROCHLORIDE 2.5 MILLIGRAM(S): 30 TABLET ORAL at 21:32

## 2025-02-17 RX ADMIN — INSULIN GLARGINE-YFGN 18 UNIT(S): 100 INJECTION, SOLUTION SUBCUTANEOUS at 22:04

## 2025-02-17 RX ADMIN — ENOXAPARIN SODIUM 40 MILLIGRAM(S): 100 INJECTION SUBCUTANEOUS at 13:07

## 2025-02-17 RX ADMIN — INSULIN LISPRO 2 UNIT(S): 100 INJECTION, SOLUTION INTRAVENOUS; SUBCUTANEOUS at 09:13

## 2025-02-17 RX ADMIN — INSULIN LISPRO 1: 100 INJECTION, SOLUTION INTRAVENOUS; SUBCUTANEOUS at 13:07

## 2025-02-17 NOTE — PROGRESS NOTE ADULT - PROBLEM SELECTOR PLAN 2
-d/t fall   -Xray of hips negative for fractures   -CTH, maxillofacial, and cervical spine without evidence of acute injury  -pain control   -PT recommends INDU
-d/t fall   -Xray of hips negative for fractures   -CTH, maxillofacial, and cervical spine without evidence of acute injury  -pain control   -PT recommends INDU
-will order iron studies, ferritin
-d/t fall   -Xray of hips negative for fractures   -CTH, maxillofacial, and cervical spine without evidence of acute injury  -pain control   -PT recommends INDU
-d/t fall   -Xray of hips negative for fractures   -CTH, maxillofacial, and cervical spine without evidence of acute injury  -pain control   -PT recommends INDU

## 2025-02-17 NOTE — PROGRESS NOTE ADULT - PROBLEM SELECTOR PLAN 4
-prescribed metformin 500 mg BID at home, but pt states not taking  -pt told ACP she takes 36 u Lantus qhs, but [facility], pt [facility]'t [facility] in months  -Hgb A1c 9.6   -c/w Lantus 18 u qhs  -will add Admelog 4 units TID   -c/w ISS  -monitor fingersticks
-c/w amlodipine 5 mg daily
-prescribed metformin 500 mg BID at home, but pt states not taking  -pt told ACP she takes 36 u Lantus qhs, but [facility], pt hasn't filled Rx in months  -Hgb A1c 9.6   -c/w Lantus 18 u qhs  -decrease Admelog to 2 units TID   -c/w ISS  -monitor fingersticks
-prescribed metformin 500 mg BID at home, but pt states not taking  -pt told ACP she takes 36 u Lantus qhs, but [facility], pt hasn't filled Rx in months  -Hgb A1c 9.6   -c/w Lantus 18 units qhs  -c/w Admelog 2 units TID   -c/w ISS  -monitor fingersticks
-prescribed metformin 500 mg BID at home, but pt states not taking  -pt told ACP she takes 36 u Lantus qhs, but [facility], pt hasn't filled Rx in months  -Hgb A1c 9.6   -c/w Lantus 18 u qhs  -added Admelog 4 units TID   -c/w ISS  -monitor fingersticks

## 2025-02-17 NOTE — PROGRESS NOTE ADULT - PROBLEM SELECTOR PLAN 3
-iron studies consistent w/ anemia of chronic disease   -monitor CBC
-prescribed metformin 500 mg BID at home, but pt states not taking  -pt told ACP she takes 36 u Lantus qhs, but [facility], pt hasn't filled in months. Will decrease dose to 18 u qhs  -ISS ac and hs  -A1C 9.6  - possibly orthostatic due to osmotic diuresis vs autonomic dysfunction due to poorly controlled DM.
-iron studies consistent w/ anemia of chronic disease   -monitor CBC

## 2025-02-17 NOTE — PROGRESS NOTE ADULT - PROBLEM SELECTOR PLAN 7
-DVT ppx: Lovenox sq  -Diet: DASH, consistent carb  -Dispo: PT recommends INDU

## 2025-02-17 NOTE — PROGRESS NOTE ADULT - PROBLEM SELECTOR PROBLEM 4
Type 2 diabetes mellitus
Hypertension
Type 2 diabetes mellitus

## 2025-02-17 NOTE — PROGRESS NOTE ADULT - PROBLEM SELECTOR PLAN 5
-DVT ppx: Lovenox sq  -Diet: DASH, consistent carb  -Dispo: pending further w/u and PT eval
-c/w amlodipine 5 mg daily  -monitor BP

## 2025-02-17 NOTE — PROGRESS NOTE ADULT - ASSESSMENT
80 y.o. F with PMH of T2DM, HTN, HLD, vertigo who presents with syncope. orthostatic positive from laying to sitting. 
80 y.o. F with PMH of T2DM, HTN, HLD, vertigo who presents with syncope. orthostatic positive from laying to sitting. 
80 y.o. F with PMH of T2DM, HTN, HLD, vertigo who presents with syncope. orthostatic positive from laying to sitting. still symptomatic after some IVF. 
80 y.o. F with PMH of T2DM, HTN, HLD, vertigo who presents with syncope. orthostatic positive from laying to sitting. 
80 y.o. F with PMH of T2DM, HTN, HLD, vertigo who presents with syncope. orthostatic positive from laying to sitting.

## 2025-02-17 NOTE — PROGRESS NOTE ADULT - PROBLEM SELECTOR PLAN 1
-pt found down on the floor, does not recall how long she was down  -orthostatics positive, improved s/p IVF   -EKG NSR  -troponin 7  -TTE w/ normal LV systolic function (EF 64 %), hypokinesis of the basal inferior and basal inferoseptal walls, mild LV diastolic dysfunction  -no events on telemetry  -appreciate cardiology input, no further inpatient cardiac w/up   -outpatient holter monitor
-pt found down on the floor, does not recall how long she was down  -orthostatics positive, repeat orthostatics s/p IVF   -EKG NSR  -troponin 7  -TTE w/ normal LV systolic function (EF 64 %), hypokinesis of the basal inferior and basal inferoseptal walls, mild LV diastolic dysfunction  -monitor on telemetry  -cardiology consult for wall motion abnormalities
-pt found down on the floor, does not recall how long she was down  -orthostatics positive, improved s/p IVF   -EKG NSR  -troponin 7  -TTE w/ normal LV systolic function (EF 64 %), hypokinesis of the basal inferior and basal inferoseptal walls, mild LV diastolic dysfunction  -no events on telemetry  -appreciate cardiology input, no further inpatient cardiac w/up   -outpatient holter monitor
-pt found down on the floor, does not recall how long she was down  -orthostatics positive, improved s/p IVF   -EKG NSR  -troponin 7  -TTE w/ normal LV systolic function (EF 64 %), hypokinesis of the basal inferior and basal inferoseptal walls, mild LV diastolic dysfunction  -no events on telemetry  -appreciate cardiology input, no further inpatient cardiac w/up   -outpatient holter monitor
-pt found down on the floor, does not recall how long she was down. F/u CPK level  -endorsed lightheadedness, not vertigo, prior to episode  -obtain orthostatics positive.   -EKG NSR  -TTE unremarakble.   -monitor on telemetry  -CTH, maxillofacial, and cervical spine without evidence of acute injury  - IVF today. repeat orthostatic tomorrow AM after additional IVF.  - carotid doppler to rule-out severe carotid disease.

## 2025-02-18 ENCOUNTER — TRANSCRIPTION ENCOUNTER (OUTPATIENT)
Age: 80
End: 2025-02-18

## 2025-02-18 VITALS
SYSTOLIC BLOOD PRESSURE: 135 MMHG | DIASTOLIC BLOOD PRESSURE: 67 MMHG | RESPIRATION RATE: 16 BRPM | HEART RATE: 77 BPM | OXYGEN SATURATION: 99 % | TEMPERATURE: 98 F

## 2025-02-18 LAB
ANION GAP SERPL CALC-SCNC: 8 MMOL/L — SIGNIFICANT CHANGE UP (ref 7–14)
BUN SERPL-MCNC: 14 MG/DL — SIGNIFICANT CHANGE UP (ref 7–23)
CALCIUM SERPL-MCNC: 8.9 MG/DL — SIGNIFICANT CHANGE UP (ref 8.4–10.5)
CHLORIDE SERPL-SCNC: 107 MMOL/L — SIGNIFICANT CHANGE UP (ref 98–107)
CO2 SERPL-SCNC: 23 MMOL/L — SIGNIFICANT CHANGE UP (ref 22–31)
CREAT SERPL-MCNC: 0.75 MG/DL — SIGNIFICANT CHANGE UP (ref 0.5–1.3)
EGFR: 81 ML/MIN/1.73M2 — SIGNIFICANT CHANGE UP
GLUCOSE BLDC GLUCOMTR-MCNC: 130 MG/DL — HIGH (ref 70–99)
GLUCOSE BLDC GLUCOMTR-MCNC: 267 MG/DL — HIGH (ref 70–99)
GLUCOSE SERPL-MCNC: 137 MG/DL — HIGH (ref 70–99)
HCT VFR BLD CALC: 34 % — LOW (ref 34.5–45)
HGB BLD-MCNC: 10.6 G/DL — LOW (ref 11.5–15.5)
MAGNESIUM SERPL-MCNC: 2 MG/DL — SIGNIFICANT CHANGE UP (ref 1.6–2.6)
MCHC RBC-ENTMCNC: 23.3 PG — LOW (ref 27–34)
MCHC RBC-ENTMCNC: 31.2 G/DL — LOW (ref 32–36)
MCV RBC AUTO: 74.7 FL — LOW (ref 80–100)
NRBC # BLD AUTO: 0 K/UL — SIGNIFICANT CHANGE UP (ref 0–0)
NRBC # FLD: 0 K/UL — SIGNIFICANT CHANGE UP (ref 0–0)
NRBC BLD AUTO-RTO: 0 /100 WBCS — SIGNIFICANT CHANGE UP (ref 0–0)
PHOSPHATE SERPL-MCNC: 3.2 MG/DL — SIGNIFICANT CHANGE UP (ref 2.5–4.5)
PLATELET # BLD AUTO: 241 K/UL — SIGNIFICANT CHANGE UP (ref 150–400)
POTASSIUM SERPL-MCNC: 3.5 MMOL/L — SIGNIFICANT CHANGE UP (ref 3.5–5.3)
POTASSIUM SERPL-SCNC: 3.5 MMOL/L — SIGNIFICANT CHANGE UP (ref 3.5–5.3)
RBC # BLD: 4.55 M/UL — SIGNIFICANT CHANGE UP (ref 3.8–5.2)
RBC # FLD: 15.1 % — HIGH (ref 10.3–14.5)
SODIUM SERPL-SCNC: 138 MMOL/L — SIGNIFICANT CHANGE UP (ref 135–145)
WBC # BLD: 3.11 K/UL — LOW (ref 3.8–10.5)
WBC # FLD AUTO: 3.11 K/UL — LOW (ref 3.8–10.5)

## 2025-02-18 PROCEDURE — 99239 HOSP IP/OBS DSCHRG MGMT >30: CPT

## 2025-02-18 RX ORDER — ACETAMINOPHEN 500 MG/5ML
2 LIQUID (ML) ORAL
Qty: 0 | Refills: 0 | DISCHARGE
Start: 2025-02-18

## 2025-02-18 RX ORDER — METFORMIN HYDROCHLORIDE 850 MG/1
1 TABLET ORAL
Refills: 0 | DISCHARGE

## 2025-02-18 RX ORDER — SENNA 187 MG
2 TABLET ORAL
Qty: 0 | Refills: 0 | DISCHARGE
Start: 2025-02-18

## 2025-02-18 RX ORDER — INSULIN GLARGINE-YFGN 100 [IU]/ML
18 INJECTION, SOLUTION SUBCUTANEOUS
Qty: 0 | Refills: 0 | DISCHARGE
Start: 2025-02-18

## 2025-02-18 RX ORDER — INSULIN LISPRO 100 U/ML
2 INJECTION, SOLUTION INTRAVENOUS; SUBCUTANEOUS
Qty: 0 | Refills: 0 | DISCHARGE
Start: 2025-02-18

## 2025-02-18 RX ADMIN — ENOXAPARIN SODIUM 40 MILLIGRAM(S): 100 INJECTION SUBCUTANEOUS at 13:14

## 2025-02-18 RX ADMIN — AMLODIPINE BESYLATE 5 MILLIGRAM(S): 10 TABLET ORAL at 05:46

## 2025-02-18 RX ADMIN — INSULIN LISPRO 2 UNIT(S): 100 INJECTION, SOLUTION INTRAVENOUS; SUBCUTANEOUS at 13:13

## 2025-02-18 RX ADMIN — INSULIN LISPRO 2 UNIT(S): 100 INJECTION, SOLUTION INTRAVENOUS; SUBCUTANEOUS at 09:18

## 2025-02-18 RX ADMIN — INSULIN LISPRO 3: 100 INJECTION, SOLUTION INTRAVENOUS; SUBCUTANEOUS at 13:12

## 2025-02-18 NOTE — DISCHARGE NOTE PROVIDER - HOSPITAL COURSE
80 y.o. F with PMH of T2DM, HTN, HLD, vertigo who presents with syncope likely orthostatic. orthostatic positive from laying to sitting. Patient was found down on the floor, does not recall how long she was down. Described R hip pain following fall. Patient symptomatically improved s/p IVF. EKG NSR, troponin negative. TTE w/ normal LV systolic function (EF 64 %), hypokinesis of the basal inferior and basal inferoseptal walls, mild LV diastolic dysfunction. Cardiology evaluated recommending outpatient work-up with holter monitor. CTH was negative for acute changes and XR of hips negative for fracture or dislocation. PT evaluated recommending INDU.    According to pharmacy patient has not filled insulin in months, patient states she is not taking metformin at home. A1C 9.6. While inpatient, glycemic control was achieved   with lantus 18u qHS and lispro 2u TID qAC.

## 2025-02-18 NOTE — DISCHARGE NOTE NURSING/CASE MANAGEMENT/SOCIAL WORK - FINANCIAL ASSISTANCE
[facility] provides services at a reduced cost to those who are determined to be eligible through [facility]’s financial assistance program. Information regarding [facility]’s financial assistance program can be found by going to [email or web address] or by calling [phone].

## 2025-02-18 NOTE — DISCHARGE NOTE PROVIDER - ATTENDING DISCHARGE PHYSICAL EXAMINATION:
Vital Signs Last 24 Hrs  T(C): 36.7 (18 Feb 2025 04:55), Max: 36.9 (17 Feb 2025 19:50)  T(F): 98.1 (18 Feb 2025 04:55), Max: 98.5 (17 Feb 2025 19:50)  HR: 71 (18 Feb 2025 04:55) (71 - 73)  BP: 141/71 (18 Feb 2025 04:55) (127/64 - 141/71)  BP(mean): --  RR: 18 (18 Feb 2025 04:55) (18 - 18)  SpO2: 99% (18 Feb 2025 04:55) (99% - 99%)    Parameters below as of 18 Feb 2025 04:55  Patient On (Oxygen Delivery Method): room air        CONSTITUTIONAL: Well-groomed, in no apparent distress  EYES: No conjunctival or scleral injection, non-icteric;   ENMT: No external nasal lesions; MMM  NECK: Trachea midline without palpable neck mass; thyroid not enlarged and non-tender  RESPIRATORY: Breathing comfortably; no dullness to percussion; lungs CTA without wheeze/rhonchi/rales  CARDIOVASCULAR: +S1S2, RRR, no M/G/R; pedal pulses full and symmetric; no lower extremity edema  GASTROINTESTINAL: No palpable masses or tenderness, +BS throughout, no rebound/guarding; no hepatosplenomegaly; no hernia palpated  LYMPHATIC: No cervical LAD or tenderness  SKIN: No rashes or ulcers noted  NEUROLOGIC: CN II-XII intact; sensation intact in LEs b/l to light touch  PSYCHIATRIC: awake and alert, conversant

## 2025-02-18 NOTE — DISCHARGE NOTE PROVIDER - NSDCCPCAREPLAN_GEN_ALL_CORE_FT
PRINCIPAL DISCHARGE DIAGNOSIS  Diagnosis: Syncope  Assessment and Plan of Treatment: You were evaluated by cardiology recommending an outpatient holter monitor, please follow-up with cardiology as outpatient  Your blood pressure dropped upon changing positions, please get up slowly and stay hdyrated      SECONDARY DISCHARGE DIAGNOSES  Diagnosis: Type 2 diabetes mellitus  Assessment and Plan of Treatment: Continue insulin as prescribed  Please follow-up with PCP/endocrinology as outpatient

## 2025-02-18 NOTE — DISCHARGE NOTE PROVIDER - NSDCMRMEDTOKEN_GEN_ALL_CORE_FT
alcohol swabs: Apply topically to affected area 4 times a day  amLODIPine 5 mg oral tablet: 1 tab(s) orally once a day  glucometer (per patient&#x27;s insurance): Test blood sugars four times a day. Dispense #1 glucometer.  glucose tablets: Follow instructions on bottle when sugar is low.  Insulin Pen Needles, 4mm: 1 application subcutaneously 4 times a day. ** Use with insulin pen **  lancets: 1 application subcutaneously 4 times a day  meclizine 25 mg oral tablet: 1 tab(s) orally 3 times a day as needed for  dizziness  metFORMIN 500 mg oral tablet: 1 tab(s) orally 2 times a day  test strips (per patient&#x27;s insurance): 1 application subcutaneously 4 times a day. ** Compatible with patient&#x27;s glucometer **   acetaminophen 325 mg oral tablet: 2 tab(s) orally every 6 hours As needed Temp greater or equal to 38C (100.4F), Mild Pain (1 - 3)  amLODIPine 5 mg oral tablet: 1 tab(s) orally once a day  insulin glargine 100 units/mL subcutaneous solution: 18 unit(s) subcutaneous once a day (at bedtime)  insulin lispro 100 units/mL injectable solution: 2 unit(s) injectable 3 times a day (before meals)  meclizine 25 mg oral tablet: 1 tab(s) orally 3 times a day as needed for  dizziness  senna leaf extract oral tablet: 2 tab(s) orally once a day (at bedtime)

## 2025-02-18 NOTE — DISCHARGE NOTE NURSING/CASE MANAGEMENT/SOCIAL WORK - NSDCVIVACCINE_GEN_ALL_CORE_FT
Tdap; 16-Aug-2019 12:36; Mariaa Bennett (RN); [employer]; [id number] (Exp. Date: 07-Jun-2021); IntraMuscular; Deltoid Left.; 0.5 milliLiter(s); VIS (VIS Published: 09-May-2013, VIS Presented: 16-Aug-2019);   Tdap; 04-Jan-2018 17:05; Delia Arellano (RN); [employer]; [id number]; IntraMuscular; Deltoid Left.; 0.5 milliLiter(s); VIS (VIS Published: 09-May-2013, VIS Presented: 04-Jan-2018);

## 2025-02-18 NOTE — DISCHARGE NOTE PROVIDER - DISCHARGE DATE
Photo Preface (Leave Blank If You Do Not Want): Photographs were obtained today to monitor
Detail Level: Detailed
18-Feb-2025

## 2025-02-18 NOTE — DISCHARGE NOTE PROVIDER - NSDCFUADDAPPT_GEN_ALL_CORE_FT
Please follow up with medicine clinics for a primary care physician regarding your hospitalization and for further monitoring/management.

## 2025-02-18 NOTE — PROGRESS NOTE ADULT - SUBJECTIVE AND OBJECTIVE BOX
Cardiovascular Disease Progress Note  DATE OF SERVICE: 02-18-25 @ 09:20    Overnight events: No acute events overnight.    The patient reports R shoulder pain.  No chest pain or SOB.   Otherwise review of systems negative    Objective Findings:  T(C): 36.7 (02-18-25 @ 04:55), Max: 36.9 (02-17-25 @ 19:50)  HR: 71 (02-18-25 @ 04:55) (62 - 73)  BP: 141/71 (02-18-25 @ 04:55) (127/64 - 141/71)  RR: 18 (02-18-25 @ 04:55) (17 - 18)  SpO2: 99% (02-18-25 @ 04:55) (99% - 100%)  Wt(kg): --  Daily     Daily       Physical Exam:  Gen: NAD; Patient resting comfortably  HEENT: EOMI, Normocephalic/ atraumatic  CV: RRR, normal S1 + S2, no m/r/g  Lungs:  Normal respiratory effort; clear to auscultation bilaterally  Abd: soft, non-tender; bowel sounds present  Ext: No edema; warm and well perfused    Telemetry: Sinus; no ectopy    Laboratory Data:                        10.6   3.11  )-----------( 241      ( 18 Feb 2025 06:25 )             34.0     02-18    138  |  107  |  14  ----------------------------<  137[H]  3.5   |  23  |  0.75    Ca    8.9      18 Feb 2025 06:25  Phos  3.2     02-18  Mg     2.00     02-18                Inpatient Medications:  MEDICATIONS  (STANDING):  amLODIPine   Tablet 5 milliGRAM(s) Oral daily  dextrose 5%. 1000 milliLiter(s) (100 mL/Hr) IV Continuous <Continuous>  dextrose 5%. 1000 milliLiter(s) (50 mL/Hr) IV Continuous <Continuous>  dextrose 50% Injectable 25 Gram(s) IV Push once  dextrose 50% Injectable 12.5 Gram(s) IV Push once  dextrose 50% Injectable 25 Gram(s) IV Push once  enoxaparin Injectable 40 milliGRAM(s) SubCutaneous every 24 hours  glucagon  Injectable 1 milliGRAM(s) IntraMuscular once  influenza  Vaccine (HIGH DOSE) 0.5 milliLiter(s) IntraMuscular once  insulin glargine Injectable (LANTUS) 18 Unit(s) SubCutaneous at bedtime  insulin lispro (ADMELOG) corrective regimen sliding scale   SubCutaneous three times a day before meals  insulin lispro (ADMELOG) corrective regimen sliding scale   SubCutaneous at bedtime  insulin lispro Injectable (ADMELOG) 2 Unit(s) SubCutaneous three times a day before meals  senna 2 Tablet(s) Oral at bedtime  sodium chloride 0.9%. 1000 milliLiter(s) (75 mL/Hr) IV Continuous <Continuous>      Assessment:  80 year old woman with T2DM, HTN, HLD, and vertigo presents with syncope.     Plan of Care:    #Syncope-  Ms. Cristobal describes a prodrome of dizziness prior to the episode.   EKG and telemetry have been unremarkable.  Mild inferior LV wall motion abnormality noted with normal overall ejection fraction.  Low suspicion for arrhythmogenic etiology.   Ms. Cristobal can follow up with me in the outpatient setting for Holter monitoring.  I would not start GDMT given diastolic hypotension and history of falls.       Over 55 minutes spent on total encounter; 100% of the visit was spent counseling and/or coordinating care by the attending physician.      Azeem Oakes MD [facility]  Cardiovascular Disease  [phone]
Cardiovascular Disease Progress Note  DATE OF SERVICE: 02-16-25 @ 09:30    Overnight events: No acute events overnight.   The patient denies pain.    Otherwise review of systems negative    Objective Findings:  T(C): 36.4 (02-16-25 @ 04:20), Max: 36.7 (02-15-25 @ 11:38)  HR: 78 (02-16-25 @ 04:20) (70 - 78)  BP: 126/70 (02-16-25 @ 04:20) (117/60 - 138/64)  RR: 18 (02-16-25 @ 04:20) (16 - 18)  SpO2: 98% (02-16-25 @ 04:20) (98% - 100%)  Wt(kg): --  Daily     Daily       Physical Exam:  Gen: NAD; Patient resting comfortably  HEENT: EOMI, Normocephalic/ atraumatic  CV: RRR, normal S1 + S2, no m/r/g  Lungs:  Normal respiratory effort; clear to auscultation bilaterally  Abd: soft, non-tender; bowel sounds present  Ext: No edema; warm and well perfused    Telemetry: n/a    Laboratory Data:                        11.0   3.63  )-----------( 236      ( 16 Feb 2025 07:05 )             34.5     02-15    140  |  105  |  11  ----------------------------<  112[H]  4.0   |  22  |  0.77    Ca    9.0      15 Feb 2025 06:00  Phos  3.4     02-15  Mg     2.20     02-15                Inpatient Medications:  MEDICATIONS  (STANDING):  amLODIPine   Tablet 5 milliGRAM(s) Oral daily  dextrose 5%. 1000 milliLiter(s) (100 mL/Hr) IV Continuous <Continuous>  dextrose 5%. 1000 milliLiter(s) (50 mL/Hr) IV Continuous <Continuous>  dextrose 50% Injectable 25 Gram(s) IV Push once  dextrose 50% Injectable 12.5 Gram(s) IV Push once  dextrose 50% Injectable 25 Gram(s) IV Push once  enoxaparin Injectable 40 milliGRAM(s) SubCutaneous every 24 hours  glucagon  Injectable 1 milliGRAM(s) IntraMuscular once  influenza  Vaccine (HIGH DOSE) 0.5 milliLiter(s) IntraMuscular once  insulin glargine Injectable (LANTUS) 18 Unit(s) SubCutaneous at bedtime  insulin lispro (ADMELOG) corrective regimen sliding scale   SubCutaneous three times a day before meals  insulin lispro (ADMELOG) corrective regimen sliding scale   SubCutaneous at bedtime  insulin lispro Injectable (ADMELOG) 4 Unit(s) SubCutaneous three times a day before meals  sodium chloride 0.9%. 1000 milliLiter(s) (75 mL/Hr) IV Continuous <Continuous>      Assessment: 80 year old woman with T2DM, HTN, HLD, and vertigo presents with syncope.     Plan of Care:    #Syncope-  Ms. Cristobal describes a prodrome of dizziness prior to the episode.   EKG is unremarkable.  Mild inferior LV wall motion abnormality noted with normal overall ejection fraction.  Low suspicion for arrhythmogenic etiology.   Ms. Cristobal can follow up with me in the outpatient setting for Holter monitoring.  I would not start GDMT given diastolic hypotension and history of falls.     #ACP (advance care planning)-  CPT 60386  Advanced care planning was discussed with the patient.    No plan for invasive cardiac testing at this time.       Over 55 minutes spent on total encounter; 100% of the visit was spent counseling and/or coordinating care by the attending physician.      Azeem Oakes MD [facility]  Cardiovascular Disease  [phone]
Cardiovascular Disease Progress Note  DATE OF SERVICE: 02-17-25 @ 09:22    Overnight events: No acute events overnight.    The patient is in no distress.   Mild R shoulder pain.   Otherwise review of systems negative    Objective Findings:  T(C): 36.7 (02-17-25 @ 06:10), Max: 36.8 (02-16-25 @ 19:20)  HR: 66 (02-17-25 @ 06:10) (66 - 83)  BP: 145/67 (02-17-25 @ 06:10) (120/70 - 145/67)  RR: 18 (02-17-25 @ 06:10) (16 - 18)  SpO2: 99% (02-17-25 @ 06:10) (97% - 100%)  Wt(kg): --  Daily     Daily       Physical Exam:  Gen: NAD; Patient resting comfortably  HEENT:  Normocephalic/ atraumatic  CV: RRR, normal S1 + S2, no m/r/g  Lungs:  Normal respiratory effort; clear to auscultation bilaterally  Abd: soft, non-tender; bowel sounds present  Ext: No edema; warm and well perfused    Telemetry: n/a    Laboratory Data:                        10.9   3.55  )-----------( 250      ( 17 Feb 2025 05:30 )             35.4     02-17    140  |  106  |  13  ----------------------------<  108[H]  4.0   |  21[L]  |  0.85    Ca    8.8      17 Feb 2025 05:30  Phos  3.4     02-17  Mg     2.00     02-17                Inpatient Medications:  MEDICATIONS  (STANDING):  amLODIPine   Tablet 5 milliGRAM(s) Oral daily  dextrose 5%. 1000 milliLiter(s) (100 mL/Hr) IV Continuous <Continuous>  dextrose 5%. 1000 milliLiter(s) (50 mL/Hr) IV Continuous <Continuous>  dextrose 50% Injectable 25 Gram(s) IV Push once  dextrose 50% Injectable 12.5 Gram(s) IV Push once  dextrose 50% Injectable 25 Gram(s) IV Push once  enoxaparin Injectable 40 milliGRAM(s) SubCutaneous every 24 hours  glucagon  Injectable 1 milliGRAM(s) IntraMuscular once  influenza  Vaccine (HIGH DOSE) 0.5 milliLiter(s) IntraMuscular once  insulin glargine Injectable (LANTUS) 18 Unit(s) SubCutaneous at bedtime  insulin lispro (ADMELOG) corrective regimen sliding scale   SubCutaneous three times a day before meals  insulin lispro (ADMELOG) corrective regimen sliding scale   SubCutaneous at bedtime  insulin lispro Injectable (ADMELOG) 2 Unit(s) SubCutaneous three times a day before meals  sodium chloride 0.9%. 1000 milliLiter(s) (75 mL/Hr) IV Continuous <Continuous>      Assessment: 80 year old woman with T2DM, HTN, HLD, and vertigo presents with syncope.     Plan of Care:    #Syncope-  Ms. Cristobal describes a prodrome of dizziness prior to the episode.   EKG is unremarkable.  Mild inferior LV wall motion abnormality noted with normal overall ejection fraction.  Low suspicion for arrhythmogenic etiology.   Ms. Cristobal can follow up with me in the outpatient setting for Holter monitoring.  I would not start GDMT given diastolic hypotension and history of falls.              Over 55 minutes spent on total encounter; 100% of the visit was spent counseling and/or coordinating care by the attending physician.      Azeem Oakes MD [facility]  Cardiovascular Disease  [phone]
PROGRESS NOTE:     Patient is a 80y old  Female who presents with a chief complaint of Syncope (17 Feb 2025 07:22)      SUBJECTIVE / OVERNIGHT EVENTS: no acute events.     ADDITIONAL REVIEW OF SYSTEMS:    MEDICATIONS  (STANDING):  amLODIPine   Tablet 5 milliGRAM(s) Oral daily  dextrose 5%. 1000 milliLiter(s) (100 mL/Hr) IV Continuous <Continuous>  dextrose 5%. 1000 milliLiter(s) (50 mL/Hr) IV Continuous <Continuous>  dextrose 50% Injectable 25 Gram(s) IV Push once  dextrose 50% Injectable 12.5 Gram(s) IV Push once  dextrose 50% Injectable 25 Gram(s) IV Push once  enoxaparin Injectable 40 milliGRAM(s) SubCutaneous every 24 hours  glucagon  Injectable 1 milliGRAM(s) IntraMuscular once  influenza  Vaccine (HIGH DOSE) 0.5 milliLiter(s) IntraMuscular once  insulin glargine Injectable (LANTUS) 18 Unit(s) SubCutaneous at bedtime  insulin lispro (ADMELOG) corrective regimen sliding scale   SubCutaneous three times a day before meals  insulin lispro (ADMELOG) corrective regimen sliding scale   SubCutaneous at bedtime  insulin lispro Injectable (ADMELOG) 2 Unit(s) SubCutaneous three times a day before meals  senna 2 Tablet(s) Oral at bedtime  sodium chloride 0.9%. 1000 milliLiter(s) (75 mL/Hr) IV Continuous <Continuous>    MEDICATIONS  (PRN):  acetaminophen     Tablet .. 650 milliGRAM(s) Oral every 6 hours PRN Temp greater or equal to 38C (100.4F), Mild Pain (1 - 3)  aluminum hydroxide/magnesium hydroxide/simethicone Suspension 30 milliLiter(s) Oral every 4 hours PRN Dyspepsia  dextrose Oral Gel 15 Gram(s) Oral once PRN Blood Glucose LESS THAN 70 milliGRAM(s)/deciliter  meclizine 25 milliGRAM(s) Oral three times a day PRN for dizziness  melatonin 3 milliGRAM(s) Oral at bedtime PRN Insomnia  ondansetron Injectable 4 milliGRAM(s) IV Push every 8 hours PRN Nausea and/or Vomiting  oxyCODONE    IR 2.5 milliGRAM(s) Oral every 6 hours PRN Severe Pain (7 - 10)      CAPILLARY BLOOD GLUCOSE      POCT Blood Glucose.: 114 mg/dL (17 Feb 2025 08:38)  POCT Blood Glucose.: 190 mg/dL (16 Feb 2025 21:15)  POCT Blood Glucose.: 241 mg/dL (16 Feb 2025 17:41)  POCT Blood Glucose.: 186 mg/dL (16 Feb 2025 12:27)    I&O's Summary      PHYSICAL EXAM:  Vital Signs Last 24 Hrs  T(C): 36.5 (17 Feb 2025 10:46), Max: 36.8 (16 Feb 2025 19:20)  T(F): 97.7 (17 Feb 2025 10:46), Max: 98.3 (16 Feb 2025 19:20)  HR: 62 (17 Feb 2025 10:46) (62 - 83)  BP: 128/71 (17 Feb 2025 10:46) (120/70 - 145/67)  BP(mean): --  RR: 17 (17 Feb 2025 10:46) (16 - 18)  SpO2: 100% (17 Feb 2025 10:46) (97% - 100%)    Parameters below as of 17 Feb 2025 10:46  Patient On (Oxygen Delivery Method): room air      General: NAD   Neurology:  R eye ptosis   Eyes: PERRLA/ EOMI, Gross vision intact  ENT/Neck: Neck supple, trachea midline, No JVD, Gross hearing intact.   Respiratory: CTA B/L, No wheezing, rales, rhonchi  CV: RRR, S1S2, no murmurs, rubs or gallops. No LE edema  Abdominal: Soft, NT, ND +BS,   Extremities:  + peripheral pulses  Skin: No Rashes    LABS:                        10.9   3.55  )-----------( 250      ( 17 Feb 2025 05:30 )             35.4     02-17    140  |  106  |  13  ----------------------------<  108[H]  4.0   |  21[L]  |  0.85    Ca    8.8      17 Feb 2025 05:30  Phos  3.4     02-17  Mg     2.00     02-17            Urinalysis Basic - ( 17 Feb 2025 05:30 )    Color: x / Appearance: x / SG: x / pH: x  Gluc: 108 mg/dL / Ketone: x  / Bili: x / Urobili: x   Blood: x / Protein: x / Nitrite: x   Leuk Esterase: x / RBC: x / WBC x   Sq Epi: x / Non Sq Epi: x / Bacteria: x          RADIOLOGY & ADDITIONAL TESTS:  Results Reviewed:   Imaging Personally Reviewed:  Electrocardiogram Personally Reviewed:    COORDINATION OF CARE:  Care Discussed with Consultants/Other Providers [Y/N]:  Prior or Outpatient Records Reviewed [Y/N]:  
PROGRESS NOTE:     Patient is a 80y old  Female who presents with a chief complaint of Syncope (13 Feb 2025 15:09)      SUBJECTIVE / OVERNIGHT EVENTS: pt c/o pain in right hip.     ADDITIONAL REVIEW OF SYSTEMS:    MEDICATIONS  (STANDING):  amLODIPine   Tablet 5 milliGRAM(s) Oral daily  dextrose 5%. 1000 milliLiter(s) (100 mL/Hr) IV Continuous <Continuous>  dextrose 5%. 1000 milliLiter(s) (50 mL/Hr) IV Continuous <Continuous>  dextrose 50% Injectable 25 Gram(s) IV Push once  dextrose 50% Injectable 12.5 Gram(s) IV Push once  dextrose 50% Injectable 25 Gram(s) IV Push once  enoxaparin Injectable 40 milliGRAM(s) SubCutaneous every 24 hours  glucagon  Injectable 1 milliGRAM(s) IntraMuscular once  influenza  Vaccine (HIGH DOSE) 0.5 milliLiter(s) IntraMuscular once  insulin glargine Injectable (LANTUS) 18 Unit(s) SubCutaneous at bedtime  insulin lispro (ADMELOG) corrective regimen sliding scale   SubCutaneous three times a day before meals  insulin lispro (ADMELOG) corrective regimen sliding scale   SubCutaneous at bedtime  magnesium sulfate  IVPB 2 Gram(s) IV Intermittent once  sodium chloride 0.9%. 1000 milliLiter(s) (75 mL/Hr) IV Continuous <Continuous>    MEDICATIONS  (PRN):  acetaminophen     Tablet .. 650 milliGRAM(s) Oral every 6 hours PRN Temp greater or equal to 38C (100.4F), Mild Pain (1 - 3)  aluminum hydroxide/magnesium hydroxide/simethicone Suspension 30 milliLiter(s) Oral every 4 hours PRN Dyspepsia  dextrose Oral Gel 15 Gram(s) Oral once PRN Blood Glucose LESS THAN 70 milliGRAM(s)/deciliter  meclizine 25 milliGRAM(s) Oral three times a day PRN for dizziness  melatonin 3 milliGRAM(s) Oral at bedtime PRN Insomnia  ondansetron Injectable 4 milliGRAM(s) IV Push every 8 hours PRN Nausea and/or Vomiting  oxyCODONE    IR 2.5 milliGRAM(s) Oral every 6 hours PRN Severe Pain (7 - 10)      CAPILLARY BLOOD GLUCOSE      POCT Blood Glucose.: 285 mg/dL (14 Feb 2025 12:21)  POCT Blood Glucose.: 153 mg/dL (14 Feb 2025 09:05)  POCT Blood Glucose.: 218 mg/dL (13 Feb 2025 21:42)  POCT Blood Glucose.: 210 mg/dL (13 Feb 2025 17:26)    I&O's Summary      PHYSICAL EXAM:  Vital Signs Last 24 Hrs  T(C): 36.4 (14 Feb 2025 12:35), Max: 36.8 (14 Feb 2025 04:20)  T(F): 97.6 (14 Feb 2025 12:35), Max: 98.2 (14 Feb 2025 04:20)  HR: 78 (14 Feb 2025 12:35) (72 - 79)  BP: 127/67 (14 Feb 2025 12:35) (127/67 - 156/77)  BP(mean): --  RR: 18 (14 Feb 2025 12:35) (18 - 18)  SpO2: 97% (14 Feb 2025 12:35) (97% - 98%)    Parameters below as of 14 Feb 2025 12:35  Patient On (Oxygen Delivery Method): room air    General: NAD   Neurology:  R eye ptosis   Eyes: PERRLA/ EOMI, Gross vision intact  ENT/Neck: Neck supple, trachea midline, No JVD, Gross hearing intact.   Respiratory: CTA B/L, No wheezing, rales, rhonchi  CV: RRR, S1S2, no murmurs, rubs or gallops. No LE edema  Abdominal: Soft, NT, ND +BS,   Extremities:  + peripheral pulses  Skin: No Rashes, Hematoma, Ecchymosis    LABS:                        10.8   3.15  )-----------( 235      ( 14 Feb 2025 07:06 )             33.7     02-14    140  |  106  |  10  ----------------------------<  161[H]  3.4[L]   |  23  |  0.72    Ca    8.6      14 Feb 2025 07:06  Phos  2.6     02-14  Mg     1.90     02-14            Urinalysis Basic - ( 14 Feb 2025 07:06 )    Color: x / Appearance: x / SG: x / pH: x  Gluc: 161 mg/dL / Ketone: x  / Bili: x / Urobili: x   Blood: x / Protein: x / Nitrite: x   Leuk Esterase: x / RBC: x / WBC x   Sq Epi: x / Non Sq Epi: x / Bacteria: x          RADIOLOGY & ADDITIONAL TESTS:  Results Reviewed:   Imaging Personally Reviewed:  Electrocardiogram Personally Reviewed:    COORDINATION OF CARE:  Care Discussed with Consultants/Other Providers [Y/N]:  Prior or Outpatient Records Reviewed [Y/N]:  
PROGRESS NOTE:     Patient is a 80y old  Female who presents with a chief complaint of Syncope (15 Feb 2025 09:43)      SUBJECTIVE / OVERNIGHT EVENTS: no acute events.     ADDITIONAL REVIEW OF SYSTEMS:    MEDICATIONS  (STANDING):  amLODIPine   Tablet 5 milliGRAM(s) Oral daily  dextrose 5%. 1000 milliLiter(s) (100 mL/Hr) IV Continuous <Continuous>  dextrose 5%. 1000 milliLiter(s) (50 mL/Hr) IV Continuous <Continuous>  dextrose 50% Injectable 25 Gram(s) IV Push once  dextrose 50% Injectable 12.5 Gram(s) IV Push once  dextrose 50% Injectable 25 Gram(s) IV Push once  enoxaparin Injectable 40 milliGRAM(s) SubCutaneous every 24 hours  glucagon  Injectable 1 milliGRAM(s) IntraMuscular once  influenza  Vaccine (HIGH DOSE) 0.5 milliLiter(s) IntraMuscular once  insulin glargine Injectable (LANTUS) 18 Unit(s) SubCutaneous at bedtime  insulin lispro (ADMELOG) corrective regimen sliding scale   SubCutaneous three times a day before meals  insulin lispro (ADMELOG) corrective regimen sliding scale   SubCutaneous at bedtime  insulin lispro Injectable (ADMELOG) 4 Unit(s) SubCutaneous three times a day before meals  sodium chloride 0.9%. 1000 milliLiter(s) (75 mL/Hr) IV Continuous <Continuous>    MEDICATIONS  (PRN):  acetaminophen     Tablet .. 650 milliGRAM(s) Oral every 6 hours PRN Temp greater or equal to 38C (100.4F), Mild Pain (1 - 3)  aluminum hydroxide/magnesium hydroxide/simethicone Suspension 30 milliLiter(s) Oral every 4 hours PRN Dyspepsia  dextrose Oral Gel 15 Gram(s) Oral once PRN Blood Glucose LESS THAN 70 milliGRAM(s)/deciliter  meclizine 25 milliGRAM(s) Oral three times a day PRN for dizziness  melatonin 3 milliGRAM(s) Oral at bedtime PRN Insomnia  ondansetron Injectable 4 milliGRAM(s) IV Push every 8 hours PRN Nausea and/or Vomiting  oxyCODONE    IR 2.5 milliGRAM(s) Oral every 6 hours PRN Severe Pain (7 - 10)      CAPILLARY BLOOD GLUCOSE      POCT Blood Glucose.: 112 mg/dL (15 Feb 2025 08:28)  POCT Blood Glucose.: 134 mg/dL (14 Feb 2025 23:16)  POCT Blood Glucose.: 106 mg/dL (14 Feb 2025 21:48)  POCT Blood Glucose.: 122 mg/dL (14 Feb 2025 17:36)  POCT Blood Glucose.: 285 mg/dL (14 Feb 2025 12:21)    I&O's Summary      PHYSICAL EXAM:  Vital Signs Last 24 Hrs  T(C): 36.3 (15 Feb 2025 04:20), Max: 36.4 (14 Feb 2025 12:35)  T(F): 97.3 (15 Feb 2025 04:20), Max: 97.6 (14 Feb 2025 12:35)  HR: 68 (15 Feb 2025 04:20) (68 - 78)  BP: 121/65 (15 Feb 2025 04:20) (121/65 - 127/67)  BP(mean): --  RR: 18 (15 Feb 2025 04:20) (18 - 18)  SpO2: 98% (15 Feb 2025 04:20) (97% - 98%)    Parameters below as of 15 Feb 2025 04:20  Patient On (Oxygen Delivery Method): room air    General: NAD   Neurology:  R eye ptosis   Eyes: PERRLA/ EOMI, Gross vision intact  ENT/Neck: Neck supple, trachea midline, No JVD, Gross hearing intact.   Respiratory: CTA B/L, No wheezing, rales, rhonchi  CV: RRR, S1S2, no murmurs, rubs or gallops. No LE edema  Abdominal: Soft, NT, ND +BS,   Extremities:  + peripheral pulses  Skin: No Rashes, Hematoma, Ecchymosis    LABS:                        10.8   3.15  )-----------( 235      ( 14 Feb 2025 07:06 )             33.7     02-15    140  |  105  |  11  ----------------------------<  112[H]  4.0   |  22  |  0.77    Ca    9.0      15 Feb 2025 06:00  Phos  3.4     02-15  Mg     2.20     02-15            Urinalysis Basic - ( 15 Feb 2025 06:00 )    Color: x / Appearance: x / SG: x / pH: x  Gluc: 112 mg/dL / Ketone: x  / Bili: x / Urobili: x   Blood: x / Protein: x / Nitrite: x   Leuk Esterase: x / RBC: x / WBC x   Sq Epi: x / Non Sq Epi: x / Bacteria: x          RADIOLOGY & ADDITIONAL TESTS:  Results Reviewed:   Imaging Personally Reviewed:  Electrocardiogram Personally Reviewed:    COORDINATION OF CARE:  Care Discussed with Consultants/Other Providers [Y/N]:  Prior or Outpatient Records Reviewed [Y/N]:  
PROGRESS NOTE:     Patient is a 80y old  Female who presents with a chief complaint of Syncope (16 Feb 2025 08:29)      SUBJECTIVE / OVERNIGHT EVENTS: no acute events.     ADDITIONAL REVIEW OF SYSTEMS:    MEDICATIONS  (STANDING):  amLODIPine   Tablet 5 milliGRAM(s) Oral daily  dextrose 5%. 1000 milliLiter(s) (100 mL/Hr) IV Continuous <Continuous>  dextrose 5%. 1000 milliLiter(s) (50 mL/Hr) IV Continuous <Continuous>  dextrose 50% Injectable 25 Gram(s) IV Push once  dextrose 50% Injectable 12.5 Gram(s) IV Push once  dextrose 50% Injectable 25 Gram(s) IV Push once  enoxaparin Injectable 40 milliGRAM(s) SubCutaneous every 24 hours  glucagon  Injectable 1 milliGRAM(s) IntraMuscular once  influenza  Vaccine (HIGH DOSE) 0.5 milliLiter(s) IntraMuscular once  insulin glargine Injectable (LANTUS) 18 Unit(s) SubCutaneous at bedtime  insulin lispro (ADMELOG) corrective regimen sliding scale   SubCutaneous three times a day before meals  insulin lispro (ADMELOG) corrective regimen sliding scale   SubCutaneous at bedtime  insulin lispro Injectable (ADMELOG) 4 Unit(s) SubCutaneous three times a day before meals  sodium chloride 0.9%. 1000 milliLiter(s) (75 mL/Hr) IV Continuous <Continuous>    MEDICATIONS  (PRN):  acetaminophen     Tablet .. 650 milliGRAM(s) Oral every 6 hours PRN Temp greater or equal to 38C (100.4F), Mild Pain (1 - 3)  aluminum hydroxide/magnesium hydroxide/simethicone Suspension 30 milliLiter(s) Oral every 4 hours PRN Dyspepsia  dextrose Oral Gel 15 Gram(s) Oral once PRN Blood Glucose LESS THAN 70 milliGRAM(s)/deciliter  meclizine 25 milliGRAM(s) Oral three times a day PRN for dizziness  melatonin 3 milliGRAM(s) Oral at bedtime PRN Insomnia  ondansetron Injectable 4 milliGRAM(s) IV Push every 8 hours PRN Nausea and/or Vomiting  oxyCODONE    IR 2.5 milliGRAM(s) Oral every 6 hours PRN Severe Pain (7 - 10)      CAPILLARY BLOOD GLUCOSE      POCT Blood Glucose.: 141 mg/dL (16 Feb 2025 08:23)  POCT Blood Glucose.: 160 mg/dL (15 Feb 2025 21:22)  POCT Blood Glucose.: 91 mg/dL (15 Feb 2025 17:35)  POCT Blood Glucose.: 239 mg/dL (15 Feb 2025 12:17)    I&O's Summary      PHYSICAL EXAM:  Vital Signs Last 24 Hrs  T(C): 36.4 (16 Feb 2025 04:20), Max: 36.7 (15 Feb 2025 11:38)  T(F): 97.6 (16 Feb 2025 04:20), Max: 98 (15 Feb 2025 11:38)  HR: 78 (16 Feb 2025 04:20) (70 - 78)  BP: 126/70 (16 Feb 2025 04:20) (117/60 - 138/64)  BP(mean): --  RR: 18 (16 Feb 2025 04:20) (16 - 18)  SpO2: 98% (16 Feb 2025 04:20) (98% - 100%)    Parameters below as of 16 Feb 2025 04:20  Patient On (Oxygen Delivery Method): room air    General: NAD   Neurology:  R eye ptosis   Eyes: PERRLA/ EOMI, Gross vision intact  ENT/Neck: Neck supple, trachea midline, No JVD, Gross hearing intact.   Respiratory: CTA B/L, No wheezing, rales, rhonchi  CV: RRR, S1S2, no murmurs, rubs or gallops. No LE edema  Abdominal: Soft, NT, ND +BS,   Extremities:  + peripheral pulses  Skin: No Rashes, Hematoma, Ecchymosis    LABS:                        11.0   3.63  )-----------( 236      ( 16 Feb 2025 07:05 )             34.5     02-16    138  |  103  |  14  ----------------------------<  128[H]  3.5   |  21[L]  |  0.79    Ca    8.9      16 Feb 2025 07:05  Phos  3.1     02-16  Mg     1.90     02-16            Urinalysis Basic - ( 16 Feb 2025 07:05 )    Color: x / Appearance: x / SG: x / pH: x  Gluc: 128 mg/dL / Ketone: x  / Bili: x / Urobili: x   Blood: x / Protein: x / Nitrite: x   Leuk Esterase: x / RBC: x / WBC x   Sq Epi: x / Non Sq Epi: x / Bacteria: x          RADIOLOGY & ADDITIONAL TESTS:  Results Reviewed:   Imaging Personally Reviewed:  Electrocardiogram Personally Reviewed:    COORDINATION OF CARE:  Care Discussed with Consultants/Other Providers [Y/N]:  Prior or Outpatient Records Reviewed [Y/N]:  
[facility] Division of Hospital Medicine  Tito Holman MD  In House Pager [phone]    Patient is a 80y old  Female who presents with a chief complaint of Syncope (12 Feb 2025 11:03)    OVERNIGHT EVENTS: no acute events.   SUBJECTIVE: no new subjective symptoms. still feels lightheaded with position change/standing up.   ROS: Denied Fever, Chill, CP, SOB, Abd pain, N/V/D, LE swelling or pain.     MEDICATIONS  (STANDING):  amLODIPine   Tablet 5 milliGRAM(s) Oral daily  dextrose 5%. 1000 milliLiter(s) (100 mL/Hr) IV Continuous <Continuous>  dextrose 5%. 1000 milliLiter(s) (50 mL/Hr) IV Continuous <Continuous>  dextrose 50% Injectable 25 Gram(s) IV Push once  dextrose 50% Injectable 12.5 Gram(s) IV Push once  dextrose 50% Injectable 25 Gram(s) IV Push once  enoxaparin Injectable 40 milliGRAM(s) SubCutaneous every 24 hours  glucagon  Injectable 1 milliGRAM(s) IntraMuscular once  influenza  Vaccine (HIGH DOSE) 0.5 milliLiter(s) IntraMuscular once  insulin glargine Injectable (LANTUS) 18 Unit(s) SubCutaneous at bedtime  insulin lispro (ADMELOG) corrective regimen sliding scale   SubCutaneous three times a day before meals  insulin lispro (ADMELOG) corrective regimen sliding scale   SubCutaneous at bedtime  sodium chloride 0.9%. 1000 milliLiter(s) (75 mL/Hr) IV Continuous <Continuous>    MEDICATIONS  (PRN):  acetaminophen     Tablet .. 650 milliGRAM(s) Oral every 6 hours PRN Temp greater or equal to 38C (100.4F), Mild Pain (1 - 3)  aluminum hydroxide/magnesium hydroxide/simethicone Suspension 30 milliLiter(s) Oral every 4 hours PRN Dyspepsia  dextrose Oral Gel 15 Gram(s) Oral once PRN Blood Glucose LESS THAN 70 milliGRAM(s)/deciliter  meclizine 25 milliGRAM(s) Oral three times a day PRN for dizziness  melatonin 3 milliGRAM(s) Oral at bedtime PRN Insomnia  ondansetron Injectable 4 milliGRAM(s) IV Push every 8 hours PRN Nausea and/or Vomiting    CAPILLARY BLOOD GLUCOSE      POCT Blood Glucose.: 280 mg/dL (13 Feb 2025 12:05)  POCT Blood Glucose.: 204 mg/dL (13 Feb 2025 08:29)  POCT Blood Glucose.: 223 mg/dL (12 Feb 2025 21:50)  POCT Blood Glucose.: 209 mg/dL (12 Feb 2025 17:53)    I&O's Summary      Vital Signs Last 24 Hrs  T(C): 36.7 (13 Feb 2025 11:25), Max: 36.7 (12 Feb 2025 20:40)  T(F): 98 (13 Feb 2025 11:25), Max: 98.1 (12 Feb 2025 20:40)  HR: 80 (13 Feb 2025 11:25) (75 - 80)  BP: 148/77 (13 Feb 2025 11:25) (148/77 - 162/80)  BP(mean): --  RR: 16 (13 Feb 2025 11:25) (16 - 18)  SpO2: 99% (13 Feb 2025 11:25) (97% - 99%)    Parameters below as of 13 Feb 2025 11:25  Patient On (Oxygen Delivery Method): room air        LABS:                        9.8    3.25  )-----------( 189      ( 12 Feb 2025 10:00 )             30.2     02-12    143  |  108[H]  |  8   ----------------------------<  224[H]  3.7   |  21[L]  |  0.68    Ca    8.4      12 Feb 2025 10:00  Phos  1.8     02-12  Mg     1.90     02-12    TPro  7.3  /  Alb  3.8  /  TBili  0.5  /  DBili  x   /  AST  28  /  ALT  17  /  AlkPhos  155[H]  02-11    PT/INR - ( 11 Feb 2025 22:00 )   PT: 12.8 sec;   INR: 1.08 ratio         PTT - ( 11 Feb 2025 22:00 )  PTT:28.7 sec      Urinalysis Basic - ( 12 Feb 2025 10:00 )    Color: x / Appearance: x / SG: x / pH: x  Gluc: 224 mg/dL / Ketone: x  / Bili: x / Urobili: x   Blood: x / Protein: x / Nitrite: x   Leuk Esterase: x / RBC: x / WBC x   Sq Epi: x / Non Sq Epi: x / Bacteria: x        RADIOLOGY & ADDITIONAL TESTS:  Results Reviewed: Y  Imaging Personally Reviewed: Y  Electrocardiogram Personally Reviewed: Y    COORDINATION OF CARE:  Care Discussed with Consultants/Other Providers [Y/N]: Y  Prior or Outpatient Records Reviewed [Y/N]: Y

## 2025-02-18 NOTE — DISCHARGE NOTE PROVIDER - NSFOLLOWUPCLINICS_GEN_ALL_ED_FT
[facility] General Internal Medicine  General Internal Medicine  [address] [place] [address]  Phone: [phone]  Fax:     [facility]  Internal Medicine  [address] [place] [address]  Phone: [phone]  Fax: [phone]

## 2025-02-18 NOTE — DISCHARGE NOTE NURSING/CASE MANAGEMENT/SOCIAL WORK - PATIENT PORTAL LINK FT
You can access the FollowMyHealth Patient Portal offered by [facility] by registering at the following website: [email or web address]. By joining [facility]’s FollowMyHealth portal, you will also be able to view your health information using other applications (apps) compatible with our system.

## 2025-02-18 NOTE — DISCHARGE NOTE PROVIDER - CARE PROVIDER_API CALL
Azeem Oakes.  Internal Medicine  [address] [place] [id number]  Phone: [phone]  Fax: [phone]  Follow Up Time: 2 weeks    A PCP,   Phone: (   )    -  Fax: (   )    -  Follow Up Time: 2 weeks

## 2025-02-18 NOTE — DISCHARGE NOTE PROVIDER - PROVIDER TOKENS
PROVIDER:[TOKEN:[[id number]],FOLLOWUP:[2 weeks]],FREE:[LAST:[A PCP],PHONE:[(   )    -],FAX:[(   )    -],FOLLOWUP:[2 weeks]]

## 2025-03-11 NOTE — ED ADULT NURSE NOTE - CHPI ED NUR SYMPTOMS NEG
Fistulogram Discharge Instructions:  You had a fistulogram (evaluation of your fistula/graft). A puncture was made into your fistula/graft and your fistula/graft and blood vessels were evaluated for narrowing and clot. Please follow these instructions as you recover:    Care Instructions:   - If you received sedation for your procedure, do not drive or operate heavy machinery for the rest of the day.  - Resume your normal activities as you tolerate.  - Remove dressing tomorrow.   - You may shower tomorrow.     Follow Up:  - Follow up with your nephrologists/dialysis unit for your dialysis plan.    Please seek medical evaluation for:  - Uncontrolled bleeding from puncture site.   - Fever (greater than 101 F (38.3C)).  - Purulent (yellow/green/foul smelling) drainage from puncture site.   - Increasing pain at puncture site.  - Increasing redness at puncture site.   
no blurred vision/no change in level of consciousness/no confusion/no fever/no loss of consciousness/no nausea/no numbness/no vomiting/no weakness

## 2025-03-12 ENCOUNTER — EMERGENCY (EMERGENCY)
Facility: HOSPITAL | Age: 80
LOS: 1 days | Discharge: HOME | End: 2025-03-12
Attending: EMERGENCY MEDICINE | Admitting: EMERGENCY MEDICINE
Payer: COMMERCIAL

## 2025-03-12 VITALS
SYSTOLIC BLOOD PRESSURE: 149 MMHG | HEIGHT: 66 IN | TEMPERATURE: 99 F | RESPIRATION RATE: 18 BRPM | HEART RATE: 88 BPM | DIASTOLIC BLOOD PRESSURE: 74 MMHG | WEIGHT: 164.02 LBS | OXYGEN SATURATION: 98 %

## 2025-03-12 DIAGNOSIS — Z96.7 PRESENCE OF OTHER BONE AND TENDON IMPLANTS: Chronic | ICD-10-CM

## 2025-03-12 DIAGNOSIS — Z87.81 PERSONAL HISTORY OF (HEALED) TRAUMATIC FRACTURE: Chronic | ICD-10-CM

## 2025-03-12 DIAGNOSIS — N60.02 SOLITARY CYST OF LEFT BREAST: Chronic | ICD-10-CM

## 2025-03-12 DIAGNOSIS — I10 ESSENTIAL (PRIMARY) HYPERTENSION: ICD-10-CM

## 2025-03-12 DIAGNOSIS — Z98.89 OTHER SPECIFIED POSTPROCEDURAL STATES: Chronic | ICD-10-CM

## 2025-03-12 DIAGNOSIS — Z98.51 TUBAL LIGATION STATUS: Chronic | ICD-10-CM

## 2025-03-12 DIAGNOSIS — E16.2 HYPOGLYCEMIA, UNSPECIFIED: ICD-10-CM

## 2025-03-12 DIAGNOSIS — S52.90XA UNSPECIFIED FRACTURE OF UNSPECIFIED FOREARM, INITIAL ENCOUNTER FOR CLOSED FRACTURE: Chronic | ICD-10-CM

## 2025-03-12 DIAGNOSIS — E11.9 TYPE 2 DIABETES MELLITUS WITHOUT COMPLICATIONS: ICD-10-CM

## 2025-03-12 DIAGNOSIS — E78.5 HYPERLIPIDEMIA, UNSPECIFIED: ICD-10-CM

## 2025-03-12 LAB
ADD ON TEST-SPECIMEN IN LAB: SIGNIFICANT CHANGE UP
ALBUMIN SERPL ELPH-MCNC: 4 G/DL — SIGNIFICANT CHANGE UP (ref 3.3–5)
ALP SERPL-CCNC: 118 U/L — SIGNIFICANT CHANGE UP (ref 40–120)
ALT FLD-CCNC: 13 U/L — SIGNIFICANT CHANGE UP (ref 4–33)
ANION GAP SERPL CALC-SCNC: 10 MMOL/L — SIGNIFICANT CHANGE UP (ref 7–14)
ANION GAP SERPL CALC-SCNC: 15 MMOL/L — HIGH (ref 7–14)
AST SERPL-CCNC: 22 U/L — SIGNIFICANT CHANGE UP (ref 4–32)
BASOPHILS # BLD AUTO: 0.02 K/UL — SIGNIFICANT CHANGE UP (ref 0–0.2)
BASOPHILS NFR BLD AUTO: 0.4 % — SIGNIFICANT CHANGE UP (ref 0–2)
BILIRUB SERPL-MCNC: 0.4 MG/DL — SIGNIFICANT CHANGE UP (ref 0.2–1.2)
BUN SERPL-MCNC: 14 MG/DL — SIGNIFICANT CHANGE UP (ref 7–23)
BUN SERPL-MCNC: 17 MG/DL — SIGNIFICANT CHANGE UP (ref 7–23)
CALCIUM SERPL-MCNC: 9.2 MG/DL — SIGNIFICANT CHANGE UP (ref 8.4–10.5)
CALCIUM SERPL-MCNC: 9.4 MG/DL — SIGNIFICANT CHANGE UP (ref 8.4–10.5)
CHLORIDE SERPL-SCNC: 105 MMOL/L — SIGNIFICANT CHANGE UP (ref 98–107)
CHLORIDE SERPL-SCNC: 107 MMOL/L — SIGNIFICANT CHANGE UP (ref 98–107)
CO2 SERPL-SCNC: 20 MMOL/L — LOW (ref 22–31)
CO2 SERPL-SCNC: 22 MMOL/L — SIGNIFICANT CHANGE UP (ref 22–31)
CREAT SERPL-MCNC: 0.83 MG/DL — SIGNIFICANT CHANGE UP (ref 0.5–1.3)
CREAT SERPL-MCNC: 0.94 MG/DL — SIGNIFICANT CHANGE UP (ref 0.5–1.3)
EGFR: 62 ML/MIN/1.73M2 — SIGNIFICANT CHANGE UP
EGFR: 72 ML/MIN/1.73M2 — SIGNIFICANT CHANGE UP
EOSINOPHIL # BLD AUTO: 0.02 K/UL — SIGNIFICANT CHANGE UP (ref 0–0.5)
EOSINOPHIL NFR BLD AUTO: 0.4 % — SIGNIFICANT CHANGE UP (ref 0–6)
FLUAV AG NPH QL: SIGNIFICANT CHANGE UP
FLUBV AG NPH QL: SIGNIFICANT CHANGE UP
GLUCOSE SERPL-MCNC: 76 MG/DL — SIGNIFICANT CHANGE UP (ref 70–99)
GLUCOSE SERPL-MCNC: 88 MG/DL — SIGNIFICANT CHANGE UP (ref 70–99)
HCT VFR BLD CALC: 32.5 % — LOW (ref 34.5–45)
HGB BLD-MCNC: 10.3 G/DL — LOW (ref 11.5–15.5)
IANC: 3.24 K/UL — SIGNIFICANT CHANGE UP (ref 1.8–7.4)
IMM GRANULOCYTES NFR BLD AUTO: 0.4 % — SIGNIFICANT CHANGE UP (ref 0–0.9)
LYMPHOCYTES # BLD AUTO: 1.23 K/UL — SIGNIFICANT CHANGE UP (ref 1–3.3)
LYMPHOCYTES # BLD AUTO: 25.2 % — SIGNIFICANT CHANGE UP (ref 13–44)
MAGNESIUM SERPL-MCNC: 2 MG/DL — SIGNIFICANT CHANGE UP (ref 1.6–2.6)
MAGNESIUM SERPL-MCNC: 2 MG/DL — SIGNIFICANT CHANGE UP (ref 1.6–2.6)
MCHC RBC-ENTMCNC: 23.4 PG — LOW (ref 27–34)
MCHC RBC-ENTMCNC: 31.7 G/DL — LOW (ref 32–36)
MCV RBC AUTO: 73.7 FL — LOW (ref 80–100)
MONOCYTES # BLD AUTO: 0.36 K/UL — SIGNIFICANT CHANGE UP (ref 0–0.9)
MONOCYTES NFR BLD AUTO: 7.4 % — SIGNIFICANT CHANGE UP (ref 2–14)
NEUTROPHILS # BLD AUTO: 3.24 K/UL — SIGNIFICANT CHANGE UP (ref 1.8–7.4)
NEUTROPHILS NFR BLD AUTO: 66.2 % — SIGNIFICANT CHANGE UP (ref 43–77)
NRBC # BLD AUTO: 0 K/UL — SIGNIFICANT CHANGE UP (ref 0–0)
NRBC # FLD: 0 K/UL — SIGNIFICANT CHANGE UP (ref 0–0)
NRBC BLD AUTO-RTO: 0 /100 WBCS — SIGNIFICANT CHANGE UP (ref 0–0)
PHOSPHATE SERPL-MCNC: 2.5 MG/DL — SIGNIFICANT CHANGE UP (ref 2.5–4.5)
PHOSPHATE SERPL-MCNC: 3 MG/DL — SIGNIFICANT CHANGE UP (ref 2.5–4.5)
PLATELET # BLD AUTO: 218 K/UL — SIGNIFICANT CHANGE UP (ref 150–400)
POTASSIUM SERPL-MCNC: 3.2 MMOL/L — LOW (ref 3.5–5.3)
POTASSIUM SERPL-MCNC: 4.1 MMOL/L — SIGNIFICANT CHANGE UP (ref 3.5–5.3)
POTASSIUM SERPL-SCNC: 3.2 MMOL/L — LOW (ref 3.5–5.3)
POTASSIUM SERPL-SCNC: 4.1 MMOL/L — SIGNIFICANT CHANGE UP (ref 3.5–5.3)
PROT SERPL-MCNC: 7.9 G/DL — SIGNIFICANT CHANGE UP (ref 6–8.3)
RBC # BLD: 4.41 M/UL — SIGNIFICANT CHANGE UP (ref 3.8–5.2)
RBC # FLD: 14.3 % — SIGNIFICANT CHANGE UP (ref 10.3–14.5)
RSV RNA NPH QL NAA+NON-PROBE: SIGNIFICANT CHANGE UP
SARS-COV-2 RNA SPEC QL NAA+PROBE: SIGNIFICANT CHANGE UP
SODIUM SERPL-SCNC: 139 MMOL/L — SIGNIFICANT CHANGE UP (ref 135–145)
SODIUM SERPL-SCNC: 140 MMOL/L — SIGNIFICANT CHANGE UP (ref 135–145)
WBC # BLD: 4.89 K/UL — SIGNIFICANT CHANGE UP (ref 3.8–10.5)
WBC # FLD AUTO: 4.89 K/UL — SIGNIFICANT CHANGE UP (ref 3.8–10.5)

## 2025-03-12 PROCEDURE — 99223 1ST HOSP IP/OBS HIGH 75: CPT

## 2025-03-12 PROCEDURE — 99284 EMERGENCY DEPT VISIT MOD MDM: CPT

## 2025-03-12 RX ORDER — SODIUM CHLORIDE 9 G/1000ML
1000 INJECTION, SOLUTION INTRAVENOUS
Refills: 0 | Status: DISCONTINUED | OUTPATIENT
Start: 2025-03-12 | End: 2025-03-12

## 2025-03-12 RX ORDER — DEXTROSE 50 % IN WATER 50 %
25 SYRINGE (ML) INTRAVENOUS ONCE
Refills: 0 | Status: COMPLETED | OUTPATIENT
Start: 2025-03-12 | End: 2025-03-12

## 2025-03-12 RX ORDER — ACETAMINOPHEN 500 MG/5ML
650 LIQUID (ML) ORAL ONCE
Refills: 0 | Status: COMPLETED | OUTPATIENT
Start: 2025-03-12 | End: 2025-03-12

## 2025-03-12 RX ORDER — ACETAMINOPHEN 500 MG/5ML
1000 LIQUID (ML) ORAL ONCE
Refills: 0 | Status: COMPLETED | OUTPATIENT
Start: 2025-03-12 | End: 2025-03-12

## 2025-03-12 RX ORDER — SODIUM CHLORIDE 9 G/1000ML
1000 INJECTION, SOLUTION INTRAVENOUS
Refills: 0 | Status: ACTIVE | OUTPATIENT
Start: 2025-03-12 | End: 2026-02-08

## 2025-03-12 RX ORDER — DEXTROSE 50 % IN WATER 50 %
15 SYRINGE (ML) INTRAVENOUS ONCE
Refills: 0 | Status: ACTIVE | OUTPATIENT
Start: 2025-03-12

## 2025-03-12 RX ORDER — DEXTROSE 50 % IN WATER 50 %
25 SYRINGE (ML) INTRAVENOUS ONCE
Refills: 0 | Status: ACTIVE | OUTPATIENT
Start: 2025-03-12

## 2025-03-12 RX ORDER — GLUCAGON 3 MG/1
1 POWDER NASAL ONCE
Refills: 0 | Status: ACTIVE | OUTPATIENT
Start: 2025-03-12

## 2025-03-12 RX ORDER — SOD PHOS DI, MONO/K PHOS MONO 250 MG
1 TABLET ORAL ONCE
Refills: 0 | Status: COMPLETED | OUTPATIENT
Start: 2025-03-12 | End: 2025-03-12

## 2025-03-12 RX ORDER — SODIUM CHLORIDE 9 G/1000ML
1000 INJECTION, SOLUTION INTRAVENOUS ONCE
Refills: 0 | Status: COMPLETED | OUTPATIENT
Start: 2025-03-12 | End: 2025-03-12

## 2025-03-12 RX ORDER — AMLODIPINE BESYLATE 10 MG/1
5 TABLET ORAL DAILY
Refills: 0 | Status: ACTIVE | OUTPATIENT
Start: 2025-03-12 | End: 2026-02-08

## 2025-03-12 RX ORDER — DEXTROSE 50 % IN WATER 50 %
12.5 SYRINGE (ML) INTRAVENOUS ONCE
Refills: 0 | Status: ACTIVE | OUTPATIENT
Start: 2025-03-12 | End: 2026-02-08

## 2025-03-12 RX ORDER — ATORVASTATIN CALCIUM 80 MG/1
40 TABLET, FILM COATED ORAL AT BEDTIME
Refills: 0 | Status: ACTIVE | OUTPATIENT
Start: 2025-03-12 | End: 2026-02-08

## 2025-03-12 RX ADMIN — Medication 25 GRAM(S): at 07:14

## 2025-03-12 RX ADMIN — Medication 1000 MILLILITER(S): at 08:59

## 2025-03-12 RX ADMIN — SODIUM CHLORIDE 100 MILLILITER(S): 9 INJECTION, SOLUTION INTRAVENOUS at 12:16

## 2025-03-12 RX ADMIN — ATORVASTATIN CALCIUM 40 MILLIGRAM(S): 80 TABLET, FILM COATED ORAL at 21:52

## 2025-03-12 RX ADMIN — SODIUM CHLORIDE 1000 MILLILITER(S): 9 INJECTION, SOLUTION INTRAVENOUS at 04:58

## 2025-03-12 RX ADMIN — AMLODIPINE BESYLATE 5 MILLIGRAM(S): 10 TABLET ORAL at 18:28

## 2025-03-12 RX ADMIN — Medication 650 MILLIGRAM(S): at 10:44

## 2025-03-12 RX ADMIN — Medication 400 MILLIGRAM(S): at 05:22

## 2025-03-12 RX ADMIN — Medication 40 MILLIEQUIVALENT(S): at 05:54

## 2025-03-12 RX ADMIN — Medication 650 MILLIGRAM(S): at 11:30

## 2025-03-12 RX ADMIN — Medication 1 PACKET(S): at 08:59

## 2025-03-12 NOTE — ED PROVIDER NOTE - PHYSICAL EXAMINATION
Sitting in bed comfortably alert and oriented x 4.  Heart and lung sounds grossly normal to auscultation.  No abdominal tenderness to palpation.  No lower extremity edema or tenderness to palpation.  Intact pulses moving all 4 extremities.

## 2025-03-12 NOTE — ED CDU PROVIDER INITIAL DAY NOTE - ATTENDING APP SHARED VISIT CONTRIBUTION OF CARE
Seen and examined, have discussed plan of care with PA.  I agree with note as stated above, having amended the EMR as needed to reflect the findings.    CDU for endocrine recs/finger sticks Q4/hold off on insulin for hypoglycemia

## 2025-03-12 NOTE — ED CDU PROVIDER INITIAL DAY NOTE - OBJECTIVE STATEMENT
CDU note: 80-year-old female with past medical history of hypertension, hyperlipidemia presents to the ER with complaint of diabetes on lispro 2 units Premeal and Lantus 18 units nightly episodes of diarrhea, vomiting, abdominal cramping and cramping in her arms and legs.  Patient abdominal exam is benign labs grossly unchanged from previous with noted hemoglobin of 10.3 no leukocytosis.  K initially 3.2 with repeat 4.1, phosphorus initially 2.5 down to 3.0 magnesium normal at 2.0, A1c 8.  Negative for flu, COVID, RSV.  Patient feeling improvement of symptoms after Tylenol, fluids, Phos-Nak.  Endocrine consulted for persistent episodes of hypoglycemia.  Patient initially 67, dextrose given still with episode of hypoglycemia. Will monitor in CDU FS q 4 hours while patient not eating, endo aware, note appreciated. Hold insulin, continue to reassess.

## 2025-03-12 NOTE — PROVIDER CONTACT NOTE (HYPOGLYCEMIA EVENT) - NS PROVIDER CONTACT BACKGROUND-HYPO
Age: 80y    Gender: Female    POCT Blood Glucose:  74 mg/dL (03-12-25 @ 06:11)  67 mg/dL (03-12-25 @ 05:47)      eMAR:

## 2025-03-12 NOTE — ED PROVIDER NOTE - NSFOLLOWUPINSTRUCTIONS_ED_ALL_ED_FT
Diarrhea is loose, watery stools (bowel movements). The exact cause is often hard to find. Sometimes diarrhea is your body's way of getting rid of what caused an upset stomach. Viruses, food poisoning, and many medicines can cause diarrhea. Some people get diarrhea in response to emotional stress, anxiety, or certain foods.    Almost everyone has diarrhea now and then. It usually isn't serious, and your stools will return to normal soon. The important thing to do is replace the fluids you have lost, so you can prevent dehydration.    The doctor has checked you carefully, but problems can develop later. If you notice any problems or new symptoms, get medical treatment right away.    Follow-up care is a key part of your treatment and safety. Be sure to make and go to all appointments, and call your doctor if you are having problems. It's also a good idea to know your test results and keep a list of the medicines you take.    How can you care for yourself at home?  Watch for signs of dehydration, which means your body has lost too much water. Dehydration is a serious condition and should be treated right away. Signs of dehydration are:  Increasing thirst and dry eyes and mouth.  Feeling faint or lightheaded.  A smaller amount of urine than normal.  To prevent dehydration, drink plenty of fluids. Choose water and other clear liquids until you feel better. If you have kidney, heart, or liver disease and have to limit fluids, talk with your doctor before you increase the amount of fluids you drink.  When you feel like eating, start with small amounts of food.  The doctor may recommend that you take over-the-counter medicine, such as loperamide (Imodium). Read and follow all instructions on the label. Do not use this medicine if you have bloody diarrhea, a high fever, or other signs of serious illness. Call your doctor if you think you are having a problem with your medicine.  When should you call for help?  	  Call 911 anytime you think you may need emergency care. For example, call if:    You passed out (lost consciousness).  Your stools are maroon or very bloody.  Call your doctor now or seek immediate medical care if:    You are dizzy or lightheaded, or you feel like you may faint.  Your stools are black and look like tar, or they have streaks of blood.  You have new or worse belly pain.  You have symptoms of dehydration, such as:  Dry eyes and a dry mouth.  Passing only a little urine.  Cannot keep fluids down.  You have a new or higher fever.  Watch closely for changes in your health, and be sure to contact your doctor if:    Your diarrhea is getting worse.  You see pus in the diarrhea.  You are not getting better after 2 days (48 hours).

## 2025-03-12 NOTE — ED ADULT NURSE NOTE - OBJECTIVE STATEMENT
Received pt AOx4, in no acute distress, resting comfortably on stretcher. Pt, PMHx DM, HLD, presented to the ED c/o abdominal pain associated with multiple episodes of diarrhea x 2 days. Pt also notees cramping with her L arm and b/l LE. Reports the last thing she ate were pancakes that were cooked at home and since then, she has been having diarrhea. Pt states she was recently also in rehab for falls, pt's personal cane noted at bedside. Denies CP, SOB, dizziness, lightheadedness, n/v, fever, chills, body aches, urinary sx, blurry vision. Denies recent travel/sick contact. Resp. equal and unlabored, no wheezes or crackles, not tachypneic/tachycardic. Abd. soft, non-tender. Safety/fall precautions maintained.

## 2025-03-12 NOTE — ED ADULT TRIAGE NOTE - CHIEF COMPLAINT QUOTE
abdominal cramps and diarrhea since 12MN.  Denies fever or vomiting. History of Type 2 DM, HTN, High cholesterol.

## 2025-03-12 NOTE — ED PROVIDER NOTE - OBJECTIVE STATEMENT
80-year-old female with history of hypertension type 2 diabetes on insulin, hyperlipidemia, presenting to the emergency department with several hours of diarrhea and now cramping in her arm and leg.  Patient states that this started last night and she has had some mild abdominal cramping as well but no overt abdominal pain.  Denies any nausea or vomiting no chest pain shortness of breath fevers or chills any urinary symptoms.  She notes that she was recently in the hospital but otherwise has not been around anyone else sick.  No recent travel.  Has been eating and drinking okay up until now.

## 2025-03-12 NOTE — ED CDU PROVIDER INITIAL DAY NOTE - CLINICAL SUMMARY MEDICAL DECISION MAKING FREE TEXT BOX
80-year-old female with past medical history of hypertension, hyperlipidemia presents to the ER with complaint of diabetes on lispro 2 units Premeal and Lantus 18 units nightly episodes of diarrhea, vomiting, abdominal cramping and cramping in her arms and legs.  Patient abdominal exam is benign labs grossly unchanged from previous with noted hemoglobin of 10.3 no leukocytosis.  K initially 3.2 with repeat 4.1, phosphorus initially 2.5 down to 3.0 magnesium normal at 2.0, A1c 8.  Negative for flu, COVID, RSV.  Patient feeling improvement of symptoms after Tylenol, fluids, Phos-Nak.  Endocrine consulted for persistent episodes of hypoglycemia.  Patient initially 67, dextrose given still with episode of hypoglycemia. Will monitor in CDU FS q 4 hours while patient not eating, endo aware, note appreciated. Hold insulin, continue dextrose while patient not eating, continue to reassess.

## 2025-03-12 NOTE — ED PROVIDER NOTE - ATTENDING CONTRIBUTION TO CARE
79 y/o F with h/o HTN, hyperlipidemia, DM2 on insulin p/w cramps to bilateral upper and lower extremities.  Pt reports having diarrhea last night (reports several episodes of soft nonbloody stools).  Afterwards she was awoken at 12am with cramps to her bilateral hands and arms that then traveled to her legs.  No fever, ha, cp, abd pain, vomiting.  She last took lantus 18u at 11:30pm along with her nighttime meds.  Of note, pt with recent hospitalization for syncope.  She was discharged to [facility] on 2/18 and was dc home from rehab on 3/10.  No other recent illnesses.    Well but tired appearing, lying comfortably in stretcher, awake and alert, nontoxic.  AF/VSS.  NCAT dry mucosa.  Lungs cta bl.  Cards nl S1/S2, RRR, no MRG.  Abd soft ntnd no rebound or guarding.  No pedal edema or calf tenderness.  No focal neuro deficits.    Mild hypoglycemia her to upper 60s, given juice and food, will repeat.  Will eval for associated dehydration, other electrolyte imbalance in setting of diarrhea/soft stools, poss early viral AGE.  Abd exam is benign without any tenderness or pain to warrant emergent cross sectional imaging at this time.  Will check ekg, labs, hydrate, reassess.

## 2025-03-12 NOTE — ED ADULT NURSE NOTE - NSFALLRISKINTERV_ED_ALL_ED

## 2025-03-12 NOTE — ED PROVIDER NOTE - PROGRESS NOTE DETAILS
Repeat fingerstick after breakfast sandwich now mid 60s.  Will give amp of D50 now and plan for observation in CDU.  CDU has discharges and pending this morning so we will place in observation status admit to CDU when available for ongoing glucose monitoring. Patient Blood glucose monitoring in ED with some fluctuation pt with decreased desire to eat given abdominal cramping. Patient abd exam soft NDNT, patient givent dextrose and started IV with maintaining >120s, most recent 152. patient still not wanting to eat will keep on dextrose. CDU accepted patient aware of monitoring. Patient cramping improved at [facility]

## 2025-03-12 NOTE — ED PROVIDER NOTE - CLINICAL SUMMARY MEDICAL DECISION MAKING FREE TEXT BOX
80-year-old female with history of hypertension hyperlipidemia type 2 diabetes, presenting to the emergency department with several hours of diarrhea and cramping.  Differential primarily includes but is not limited to gastroenteritis, consider electrolyte abnormalities, dehydration and possible kidney injury.  Also consider hyperglycemia so we will get fingerstick and potentially adjust differential based off of this.  Unlikely the patient has acute intra-abdominal process at this time given nontender with reassuring vitals.  Will give liter of fluid check electrolytes and basic labs and monitor for ongoing diarrhea.  Also check flu COVID.

## 2025-03-12 NOTE — ED PROVIDER NOTE - RATE
4/1/2019      RE: Phan Luque  [address] [facility] [address] [facility] [address]       I had the pleasure of seeing Phan Luque for followup in the Liver Transplantation Clinic at the [facility] on 04/01/2019.  Ms. Luque returns for followup now 2-1/2 years status post liver transplantation for alcoholic hepatitis.      She is doing well at this visit.  She is complaining still of some joint aches and joint pains, particularly in her neck and shoulders.  This has really been her major problem and required her to go back on prednisone.      She denies any abdominal pain.  She does have some itching.  She does complain of a moderate amount of fatigue.  She denies any increased abdominal girth or lower extremity edema.  She has not had any cough or shortness of breath.  She denies any nausea, vomiting, diarrhea or constipation.  Her appetite is good, in fact it has been too good, and her weight is up.     Current Outpatient Medications   Medication     acetaminophen (TYLENOL) 325 MG tablet     amLODIPine (NORVASC) 5 MG tablet     azaTHIOprine (IMURAN) 50 MG tablet     betaxolol (BETOPTIC) 0.5 % ophthalmic solution     calcium Citrate-vitamin D 500-400 MG-UNIT CHEW     chlorthalidone (HYGROTON) 25 MG tablet     cholecalciferol (VITAMIN D3) 400 UNIT TABS tablet     clobetasol (TEMOVATE) 0.05 % external ointment     cyanocobalamin (VITAMIN  B-12) 1000 MCG tablet     diclofenac (VOLTAREN) 1 % GEL topical gel     ferrous sulfate (IRON) 325 (65 FE) MG tablet     folic acid (FOLVITE) 1 MG tablet     gabapentin (NEURONTIN) 100 MG capsule     hydrOXYzine (ATARAX) 25 MG tablet     levothyroxine (SYNTHROID/LEVOTHROID) 88 MCG tablet     MAGNESIUM OXIDE PO     melatonin 5 MG tablet     multivitamin, therapeutic (THERA-VIT) TABS tablet     Omega-3 Fatty Acids (OMEGA-3 FISH OIL EX ST PO)     order for DME     pantoprazole (PROTONIX) 40 MG EC tablet     predniSONE (DELTASONE) 1 MG tablet     predniSONE  "(DELTASONE) 5 MG tablet     psyllium 0.52 G capsule     tacrolimus (GENERIC EQUIVALENT) 0.5 MG capsule     traMADol (ULTRAM) 50 MG tablet     ursodiol (ACTIGALL) 300 MG capsule     venlafaxine (EFFEXOR XR) 37.5 MG 24 hr capsule     pramipexole (MIRAPEX) 0.5 MG tablet     traZODone (DESYREL) 100 MG tablet     No current facility-administered medications for this visit.      /64   Pulse 92   Temp 98  F (36.7  C) (Oral)   Ht 1.702 m (5' 7\")   Wt 106.1 kg (234 lb)   SpO2 95%   BMI 36.65 kg/m       HEENT exam shows no scleral icterus or temporal muscle wasting.  Her chest is clear.  Her abdominal exam shows no increase in girth.  No masses or tenderness to palpation are present.  Her liver is 10 cm in span without left lobe enlargement.  No spleen tip is palpable.  Extremity exam shows no edema.  Skin exam shows no stigmata of chronic liver disease.  Neurologic exam shows no asterixis.     Recent Results (from the past 168 hour(s))   Tacrolimus level    Collection Time: 03/27/19  7:23 AM   Result Value Ref Range    Tacrolimus Last Dose 1,930     Tacrolimus Level 5.6 5.0 - 15.0 ug/L   Hepatic panel    Collection Time: 03/27/19  7:23 AM   Result Value Ref Range    Bilirubin Direct 0.2 0.0 - 0.2 mg/dL    Bilirubin Total 0.9 0.2 - 1.3 mg/dL    Albumin 3.8 3.4 - 5.0 g/dL    Protein Total 7.4 6.8 - 8.8 g/dL    Alkaline Phosphatase 49 40 - 150 U/L    ALT 20 0 - 50 U/L    AST 17 0 - 45 U/L   Basic metabolic panel    Collection Time: 03/27/19  7:23 AM   Result Value Ref Range    Sodium 138 133 - 144 mmol/L    Potassium 3.9 3.4 - 5.3 mmol/L    Chloride 104 94 - 109 mmol/L    Carbon Dioxide 27 20 - 32 mmol/L    Anion Gap 7 3 - 14 mmol/L    Glucose 114 (H) 70 - 99 mg/dL    Urea Nitrogen 24 7 - 30 mg/dL    Creatinine 1.45 (H) 0.52 - 1.04 mg/dL    GFR Estimate 38 (L) >60 mL/min/[1.73_m2]    GFR Estimate If Black 44 (L) >60 mL/min/[1.73_m2]    Calcium 9.7 8.5 - 10.1 mg/dL   CBC with platelets    Collection Time: 03/27/19  " 7:23 AM   Result Value Ref Range    WBC 12.9 (H) 4.0 - 11.0 10e9/L    RBC Count 4.64 3.8 - 5.2 10e12/L    Hemoglobin 14.5 11.7 - 15.7 g/dL    Hematocrit 43.2 35.0 - 47.0 %    MCV 93 78 - 100 fl    MCH 31.3 26.5 - 33.0 pg    MCHC 33.6 31.5 - 36.5 g/dL    RDW 13.9 10.0 - 15.0 %    Platelet Count 313 150 - 450 10e9/L      My impression is that Ms. Luque is 2-1/2 years status post liver transplantation for alcoholic hepatitis.  She continues to abstain from alcohol and I congratulated on her sobriety.  She does have joint aches and joint pains as well as some undefined of neuropathy which is what really compromises her quality of life.  The neuropathy seems to be responding fairly well to the gabapentin, particularly in terms of ability to sleep at night.  She is otherwise up-to-date with regard to cancer screening and vaccines, and my plan will be to see her back in the clinic again in 6 months.      Thank you very much for allowing me to participate in the care of this patient.  If you have any questions regarding my recommendations, please do not hesitate to contact me.        Hussein Banegas MD      Professor of Medicine  [employer]      Executive Medical Director, Solid Organ Transplant Program  [facility]            Hussein Banegas MD       [phone]

## 2025-03-12 NOTE — CONSULT NOTE ADULT - SUBJECTIVE AND OBJECTIVE BOX
HPI: 80 yr old Female with hx of HTN, T2DM, HLD presenting to the emergency department with several hours of diarrhea and now cramping in her arm and leg.  Patient states that this started last night and she has had some mild abdominal cramping as well but no overt abdominal pain.  Denies any nausea or vomiting, no chest pain, shortness of breath, fevers or chills, or any urinary symptoms.  She notes that she was recently in the hospital but otherwise has not been around anyone else sick.  No recent travel. On admission today, patient's BG was 67 at 5:47 AM, she was treated with 25 gram Dextrose injection with BG increasing to 211 at 7:30 AM, BG trended down to 76 at 10 AM, and BG was 129 at noon. She was started on D5 infusion at 100 ml/hr at noon. Endocrinology consulted for DM management in context of episodes of hypoglycemia. Prior to admission, patient had taken long acting insulin lantus 18 units at 11 PM (received 3/11). Patient with A1c 9.6% on 2/12/25.     Diabetes history:  Age at Dx:  How dx:  Hx and duration of insulin:  Hx of hypoglycemia:  Hx of DKA/HHS?    Microvascular Complications:   Retinopathy?  Neuropathy?  Nephropathy?  Hx of foot ulcer/amputation?    Macrovascular Complications:  Hx of CHF?  Hx of CAD/PAD?  Hx of CVA?      FH:  DM:  Thyroid:  Autoimmune:  Other:    SH:  Smoking: denies  Etoh: denies  Recreational Drugs: denies    PAST MEDICAL & SURGICAL HISTORY:  Diabetes mellitus type II  HTN (hypertension), benign  Radius fracture left  Hypercholesterolemia  Anemia  Headache  Vertigo  History of cataract extraction bilateral;  (Left 02/013 - right 03/3013)  History of wrist fracture ORIF left wrist ; 2013  History of bunionectomy of left great toe  H/O tubal ligation 1983  Breast cyst, left; Excision of left breast cyst; 1964  S/P ORIF (open reduction internal fixation) fracture left ankle; ~ 5-6 years ago      Current Meds:  dextrose 5%. 1000 milliLiter(s) IV Continuous <Continuous>  dextrose 5%. 1000 milliLiter(s) IV Continuous <Continuous>  dextrose 5%. 1000 milliLiter(s) IV Continuous <Continuous>  dextrose 50% Injectable 25 Gram(s) IV Push once  dextrose 50% Injectable 12.5 Gram(s) IV Push once  dextrose 50% Injectable 25 Gram(s) IV Push once  dextrose Oral Gel 15 Gram(s) Oral once  glucagon  Injectable 1 milliGRAM(s) IntraMuscular once      Allergies:  No Known Allergies      ROS:     Constitutional: No fever, good appetite/po intake  Eyes: No blurry vision, diplopia  Neuro: No tremors  HEENT: No pain  Cardiovascular: No chest pain, palpitations  Respiratory: No SOB, no cough  GI: +abd cramping, +diarrhea  : No dysuria, hematuria  Skin: no rash  Psych: no depression  Endocrine: +hypoglycemia  Hem/lymph: no swelling  Osteoporosis: no fractures     Vital Signs Last 24 Hrs  T(C): 36.8 (12 Mar 2025 12:12), Max: 37 (12 Mar 2025 03:41)  T(F): 98.3 (12 Mar 2025 12:12), Max: 98.6 (12 Mar 2025 03:41)  HR: 72 (12 Mar 2025 12:12) (72 - 88)  BP: 157/81 (12 Mar 2025 12:12) (135/79 - 157/81)  BP(mean): --  RR: 18 (12 Mar 2025 12:12) (18 - 18)  SpO2: 98% (12 Mar 2025 12:12) (98% - 100%)    Parameters below as of 12 Mar 2025 12:12  Patient On (Oxygen Delivery Method): room air      Height (cm): 167.6 (03-12 @ 03:41)  Weight (kg): 74.4 (03-12 @ 03:41)  BMI (kg/m2): 26.5 (03-12 @ 03:41)    VITALS: T(C): 36.8 (03-12-25 @ 12:12)  T(F): 98.3 (03-12-25 @ 12:12), Max: 98.6 (03-12-25 @ 03:41)  HR: 72 (03-12-25 @ 12:12) (72 - 88)  BP: 157/81 (03-12-25 @ 12:12) (135/79 - 157/81)  RR:  (18 - 18)  SpO2:  (98% - 100%)  Wt(kg): --  GENERAL: NAD, well-groomed, well-developed  EYES: No proptosis, no lid lag, anicteric, extraocular movements intact  HEENT:  Atraumatic, Normocephalic, moist mucous membranes  THYROID: Normal size, no palpable nodules, no thyromegaly  RESPIRATORY: non labored breathing, no accessory muscle use  CARDIOVASCULAR: non tachycardic, no peripheral edema  GI: Soft, nontender, non distended   SKIN: Dry, intact, No rashes or lesions  NEURO: sensation intact, no tremor  EXTREMITIES: no foot ulcers and bilateral distal pedal pulses intact  PSYCH: reactive affect, euthymic mood      LABS:                        10.3   4.89  )-----------( 218      ( 12 Mar 2025 05:00 )             32.5     03-12    139  |  107  |  14  ----------------------------<  76  4.1   |  22  |  0.83    Ca    9.2      12 Mar 2025 10:05  Phos  3.0     03-12  Mg     2.00     03-12    TPro  7.9  /  Alb  4.0  /  TBili  0.4  /  DBili  x   /  AST  22  /  ALT  13  /  AlkPhos  118  03-12      Urinalysis Basic - ( 12 Mar 2025 10:05 )    Color: x / Appearance: x / SG: x / pH: x  Gluc: 76 mg/dL / Ketone: x  / Bili: x / Urobili: x   Blood: x / Protein: x / Nitrite: x   Leuk Esterase: x / RBC: x / WBC x   Sq Epi: x / Non Sq Epi: x / Bacteria: x      RADIOLOGY & ADDITIONAL STUDIES:  CAPILLARY BLOOD GLUCOSE      POCT Blood Glucose.: 144 mg/dL (12 Mar 2025 13:10)  POCT Blood Glucose.: 129 mg/dL (12 Mar 2025 12:18)  POCT Blood Glucose.: 123 mg/dL (12 Mar 2025 09:01)  POCT Blood Glucose.: 211 mg/dL (12 Mar 2025 07:32)  POCT Blood Glucose.: 66 mg/dL (12 Mar 2025 06:55)  POCT Blood Glucose.: 74 mg/dL (12 Mar 2025 06:11)  POCT Blood Glucose.: 67 mg/dL (12 Mar 2025 05:47)        A/P: 80 yr old Female with hx of HTN, T2DM, HLD presenting to the emergency department with several hours of diarrhea, likely secondary to viral gastroenteritis. Patient with poor PO intake in context of illness and with a couple episodes of hypoglycemia in context of receiving her usual home insulin prior to admission, endocrinology consulted for further management of T2DM    # T2DM c/b hypoglycemia  Hypoglycemia likely secondary to poor PO intake in setting of illness and patient received her full lantus dose 18 units at 11 PM on 3/11 prior to admission  - Most recent Hemoglobin A1C 9.6% on 2/12/25  - Current FS ranges from  mg/dL  - Please monitor blood glucose values TID AC & QHS while eating regular meals and Q4H while NPO  - Blood glucose goals pre-meal less than 140 mg/dL and random blood glucose less than 180 mg/dL  - Recommendations:  - Agree with holding insulin for now given poor PO intake in context of gastroenteritis  - Wean dextrose IVF as patient's PO intake improves  - Monitor FS BG q4h    Discharge planning: ***  - Home DM medications:     If patient wishes to follow up with [facility], please provide her with below information:  [facility]:  [address] [place] [address]. [address]. [place] [address]  Tel: [phone]      # HTN  - BP goal 130/80  - Check UACr outpatient  - Manage per primary team     # HLD  - Check fasting lipid panel outpatient  - Goal LDL<70  - Manage as outpatient       Thank you for the consult. Will continue to monitor. Please inform the endocrinology team at least 24 hours prior to intended discharge date.     Contact via pager or [employer] Teams during business hours. For follow up questions, discharge recommendations, or new consults please call answering service at [phone] (weekdays), [phone] (nights/weekends). For nonurgent matters, please email  [email or web address].      Lisa Loomis D.O.  Attending Physician   Department of Endocrinology, Diabetes and Metabolism   [employer] Teams     For urgent matters before 9AM or after 5PM, or on weekends/holidays, please page the on call endocrine fellow.   For nonurgent matters, please email [email or web address] for assistance. HPI: 80 yr old Female with hx of HTN, T2DM, HLD presenting to the emergency department with several hours of diarrhea and now cramping in her arm and leg.  Patient states that this started last night and she has had some mild abdominal cramping as well but no overt abdominal pain.  Denies any nausea or vomiting, no chest pain, shortness of breath, fevers or chills, or any urinary symptoms.  She notes that she was recently in the hospital but otherwise has not been around anyone else sick.  No recent travel. On admission today, patient's BG was 67 at 5:47 AM, she was treated with 25 gram Dextrose injection with BG increasing to 211 at 7:30 AM, BG trended down to 76 at 10 AM, and BG was 129 at noon. She was started on D5 infusion at 100 ml/hr at noon. Endocrinology consulted for DM management in context of episodes of hypoglycemia. Prior to admission, patient had taken long acting insulin lantus 36 units at 11 PM (received 3/11). Patient with A1c 9.6% on 2/12/25.     Diabetes history:  Reports she was diagnosed with T2DM 20+ years ago  Previously DM managed by her PCP however reports she needs a new PCP  Home regimen: basaglar 36 units qhs and metformin 1000 mg daily  BG monitoring: has glucometer but does not check regularly; reports she only checks her BG if she is feeling sick  Denies hypoglycemia episodes at home    Microvascular Complications:   Retinopathy? denies, prior hx of cataract s/p surgery  Neuropathy? yes, admits to numbness/tingling in hands and feet  Nephropathy? denies      FH:  DM in sister    SH:  Lives alone, has 3 children   Denies drug use    PAST MEDICAL & SURGICAL HISTORY:  Diabetes mellitus type II  HTN (hypertension), benign  Radius fracture left  Hypercholesterolemia  Anemia  Headache  Vertigo  History of cataract extraction bilateral;  (Left 02/013 - right 03/3013)  History of wrist fracture ORIF left wrist ; 2013  History of bunionectomy of left great toe  H/O tubal ligation 1983  Breast cyst, left; Excision of left breast cyst; 1964  S/P ORIF (open reduction internal fixation) fracture left ankle; ~ 5-6 years ago      Current Meds:  dextrose 5%. 1000 milliLiter(s) IV Continuous <Continuous>  dextrose 5%. 1000 milliLiter(s) IV Continuous <Continuous>  dextrose 5%. 1000 milliLiter(s) IV Continuous <Continuous>  dextrose 50% Injectable 25 Gram(s) IV Push once  dextrose 50% Injectable 12.5 Gram(s) IV Push once  dextrose 50% Injectable 25 Gram(s) IV Push once  dextrose Oral Gel 15 Gram(s) Oral once  glucagon  Injectable 1 milliGRAM(s) IntraMuscular once      Allergies:  No Known Allergies      ROS:     Constitutional: No fever, good appetite/po intake  Eyes: No blurry vision, diplopia  Neuro: No tremors  HEENT: No pain  Cardiovascular: No chest pain, palpitations  Respiratory: No SOB, no cough  GI: +abd cramping, +diarrhea  : No dysuria, hematuria  Skin: no rash  Psych: no depression  Endocrine: +hypoglycemia  Hem/lymph: no swelling  Osteoporosis: no fractures     Vital Signs Last 24 Hrs  T(C): 36.8 (12 Mar 2025 12:12), Max: 37 (12 Mar 2025 03:41)  T(F): 98.3 (12 Mar 2025 12:12), Max: 98.6 (12 Mar 2025 03:41)  HR: 72 (12 Mar 2025 12:12) (72 - 88)  BP: 157/81 (12 Mar 2025 12:12) (135/79 - 157/81)  BP(mean): --  RR: 18 (12 Mar 2025 12:12) (18 - 18)  SpO2: 98% (12 Mar 2025 12:12) (98% - 100%)    Parameters below as of 12 Mar 2025 12:12  Patient On (Oxygen Delivery Method): room air      Height (cm): 167.6 (03-12 @ 03:41)  Weight (kg): 74.4 (03-12 @ 03:41)  BMI (kg/m2): 26.5 (03-12 @ 03:41)    VITALS: T(C): 36.8 (03-12-25 @ 12:12)  T(F): 98.3 (03-12-25 @ 12:12), Max: 98.6 (03-12-25 @ 03:41)  HR: 72 (03-12-25 @ 12:12) (72 - 88)  BP: 157/81 (03-12-25 @ 12:12) (135/79 - 157/81)  RR:  (18 - 18)  SpO2:  (98% - 100%)  Wt(kg): --  GENERAL: NAD, well-groomed, well-developed  EYES: No proptosis, no lid lag, anicteric, extraocular movements intact  HEENT:  Atraumatic, Normocephalic, moist mucous membranes  NECK: No masses  RESPIRATORY: non labored breathing, no accessory muscle use  CARDIOVASCULAR: non tachycardic  GI: Soft, nontender, non distended   SKIN: Dry, intact, No rashes or lesions  NEURO: sensation intact, no tremor  EXTREMITIES: no gross deformity  PSYCH: reactive affect, euthymic mood      LABS:                        10.3   4.89  )-----------( 218      ( 12 Mar 2025 05:00 )             32.5     03-12    139  |  107  |  14  ----------------------------<  76  4.1   |  22  |  0.83    Ca    9.2      12 Mar 2025 10:05  Phos  3.0     03-12  Mg     2.00     03-12    TPro  7.9  /  Alb  4.0  /  TBili  0.4  /  DBili  x   /  AST  22  /  ALT  13  /  AlkPhos  118  03-12      Urinalysis Basic - ( 12 Mar 2025 10:05 )    Color: x / Appearance: x / SG: x / pH: x  Gluc: 76 mg/dL / Ketone: x  / Bili: x / Urobili: x   Blood: x / Protein: x / Nitrite: x   Leuk Esterase: x / RBC: x / WBC x   Sq Epi: x / Non Sq Epi: x / Bacteria: x      RADIOLOGY & ADDITIONAL STUDIES:  CAPILLARY BLOOD GLUCOSE      POCT Blood Glucose.: 144 mg/dL (12 Mar 2025 13:10)  POCT Blood Glucose.: 129 mg/dL (12 Mar 2025 12:18)  POCT Blood Glucose.: 123 mg/dL (12 Mar 2025 09:01)  POCT Blood Glucose.: 211 mg/dL (12 Mar 2025 07:32)  POCT Blood Glucose.: 66 mg/dL (12 Mar 2025 06:55)  POCT Blood Glucose.: 74 mg/dL (12 Mar 2025 06:11)  POCT Blood Glucose.: 67 mg/dL (12 Mar 2025 05:47)        A/P: 80 yr old Female with hx of HTN, T2DM, HLD presenting to the emergency department with several hours of diarrhea, likely secondary to viral gastroenteritis. Patient with poor PO intake in context of illness and with a couple episodes of hypoglycemia in context of receiving her usual home insulin prior to admission, endocrinology consulted for further management of T2DM    # T2DM c/b hypoglycemia  Hypoglycemia likely secondary to poor PO intake in setting of illness and patient received her full lantus dose 36 units at 11 PM on 3/11 prior to admission  - Most recent Hemoglobin A1C 9.6% on 2/12/25  - Current FS ranges from  mg/dL  - Please monitor blood glucose values TID AC & QHS while eating regular meals and Q4H while NPO  - Blood glucose goals pre-meal less than 140 mg/dL and random blood glucose less than 180 mg/dL  - Recommendations:  - Agree with holding insulin for now given poor PO intake in context of gastroenteritis  - Wean dextrose IVF as patient's PO intake improves  - Monitor FS BG q4h    Discharge planning:   Discharge DM meds: can resume metformin (pending stable GFR) and resume basal insulin (recommend lower dose, TBD closer to time of discharge)  Please order CGM Dexcom g7 for patient for discharge, patient would benefit from CGM due to hypoglycemia and hypoglycemia unawareness    If patient wishes to follow up with [facility], please provide her with below information:  [facility]:  [address] [place] [address]. [address]. [place] [address]  Tel: [phone]      # HTN  - BP goal 130/80  - Check UACr outpatient  - Manage per primary team     # HLD  - Check fasting lipid panel outpatient  - Goal LDL<70  - Manage as outpatient       Thank you for the consult. Will continue to monitor. Please inform the endocrinology team at least 24 hours prior to intended discharge date.     Contact via pager or [employer] Teams during business hours. For follow up questions, discharge recommendations, or new consults please call answering service at [phone] (weekdays), [phone] (nights/weekends). For nonurgent matters, please email  [email or web address].      Lisa Loomis D.O.  Attending Physician   Department of Endocrinology, Diabetes and Metabolism   [employer] Teams     For urgent matters before 9AM or after 5PM, or on weekends/holidays, please page the on call endocrine fellow.   For nonurgent matters, please email [email or web address] for assistance.

## 2025-03-12 NOTE — ED ADULT NURSE NOTE - HOW PATIENT ADDRESSED, PROFILE
Spoke with cg re scheduling annual exam/HTN uncontrolled with Dr Willow Schwartz . Cg stated pt passed away 1/2017.  
Sanchez

## 2025-03-13 VITALS
SYSTOLIC BLOOD PRESSURE: 145 MMHG | TEMPERATURE: 98 F | RESPIRATION RATE: 18 BRPM | HEART RATE: 77 BPM | OXYGEN SATURATION: 98 % | DIASTOLIC BLOOD PRESSURE: 73 MMHG

## 2025-03-13 LAB
ALBUMIN SERPL ELPH-MCNC: 3.4 G/DL — SIGNIFICANT CHANGE UP (ref 3.3–5)
ALP SERPL-CCNC: 110 U/L — SIGNIFICANT CHANGE UP (ref 40–120)
ALT FLD-CCNC: 11 U/L — SIGNIFICANT CHANGE UP (ref 4–33)
ANION GAP SERPL CALC-SCNC: 8 MMOL/L — SIGNIFICANT CHANGE UP (ref 7–14)
ANISOCYTOSIS BLD QL: SLIGHT — SIGNIFICANT CHANGE UP
AST SERPL-CCNC: 21 U/L — SIGNIFICANT CHANGE UP (ref 4–32)
BASOPHILS # BLD AUTO: 0 K/UL — SIGNIFICANT CHANGE UP (ref 0–0.2)
BASOPHILS NFR BLD AUTO: 0 % — SIGNIFICANT CHANGE UP (ref 0–2)
BILIRUB SERPL-MCNC: 0.4 MG/DL — SIGNIFICANT CHANGE UP (ref 0.2–1.2)
BUN SERPL-MCNC: 8 MG/DL — SIGNIFICANT CHANGE UP (ref 7–23)
CALCIUM SERPL-MCNC: 8.8 MG/DL — SIGNIFICANT CHANGE UP (ref 8.4–10.5)
CHLORIDE SERPL-SCNC: 106 MMOL/L — SIGNIFICANT CHANGE UP (ref 98–107)
CO2 SERPL-SCNC: 22 MMOL/L — SIGNIFICANT CHANGE UP (ref 22–31)
CREAT SERPL-MCNC: 0.78 MG/DL — SIGNIFICANT CHANGE UP (ref 0.5–1.3)
EGFR: 77 ML/MIN/1.73M2 — SIGNIFICANT CHANGE UP
EOSINOPHIL # BLD AUTO: 0.09 K/UL — SIGNIFICANT CHANGE UP (ref 0–0.5)
EOSINOPHIL NFR BLD AUTO: 2.6 % — SIGNIFICANT CHANGE UP (ref 0–6)
GLUCOSE SERPL-MCNC: 146 MG/DL — HIGH (ref 70–99)
HCT VFR BLD CALC: 32 % — LOW (ref 34.5–45)
HGB BLD-MCNC: 10.2 G/DL — LOW (ref 11.5–15.5)
IANC: 0.94 K/UL — LOW (ref 1.8–7.4)
LYMPHOCYTES # BLD AUTO: 1.36 K/UL — SIGNIFICANT CHANGE UP (ref 1–3.3)
LYMPHOCYTES # BLD AUTO: 40 % — SIGNIFICANT CHANGE UP (ref 13–44)
MANUAL SMEAR VERIFICATION: SIGNIFICANT CHANGE UP
MCHC RBC-ENTMCNC: 23.4 PG — LOW (ref 27–34)
MCHC RBC-ENTMCNC: 31.9 G/DL — LOW (ref 32–36)
MCV RBC AUTO: 73.4 FL — LOW (ref 80–100)
MONOCYTES # BLD AUTO: 0.18 K/UL — SIGNIFICANT CHANGE UP (ref 0–0.9)
MONOCYTES NFR BLD AUTO: 5.2 % — SIGNIFICANT CHANGE UP (ref 2–14)
NEUTROPHILS # BLD AUTO: 1 K/UL — LOW (ref 1.8–7.4)
NEUTROPHILS NFR BLD AUTO: 29.6 % — LOW (ref 43–77)
OVALOCYTES BLD QL SMEAR: SLIGHT — SIGNIFICANT CHANGE UP
PLAT MORPH BLD: ABNORMAL
PLATELET # BLD AUTO: 195 K/UL — SIGNIFICANT CHANGE UP (ref 150–400)
PLATELET COUNT - ESTIMATE: NORMAL — SIGNIFICANT CHANGE UP
POIKILOCYTOSIS BLD QL AUTO: SLIGHT — SIGNIFICANT CHANGE UP
POTASSIUM SERPL-MCNC: 3.8 MMOL/L — SIGNIFICANT CHANGE UP (ref 3.5–5.3)
POTASSIUM SERPL-SCNC: 3.8 MMOL/L — SIGNIFICANT CHANGE UP (ref 3.5–5.3)
PROT SERPL-MCNC: 6.9 G/DL — SIGNIFICANT CHANGE UP (ref 6–8.3)
RBC # BLD: 4.36 M/UL — SIGNIFICANT CHANGE UP (ref 3.8–5.2)
RBC # FLD: 14.2 % — SIGNIFICANT CHANGE UP (ref 10.3–14.5)
RBC BLD AUTO: SIGNIFICANT CHANGE UP
SCHISTOCYTES BLD QL AUTO: SLIGHT — SIGNIFICANT CHANGE UP
SODIUM SERPL-SCNC: 136 MMOL/L — SIGNIFICANT CHANGE UP (ref 135–145)
VARIANT LYMPHS # BLD: 22.6 % — HIGH (ref 0–6)
VARIANT LYMPHS NFR BLD MANUAL: 22.6 % — HIGH (ref 0–6)
WBC # BLD: 3.39 K/UL — LOW (ref 3.8–10.5)
WBC # FLD AUTO: 3.39 K/UL — LOW (ref 3.8–10.5)

## 2025-03-13 PROCEDURE — 99239 HOSP IP/OBS DSCHRG MGMT >30: CPT

## 2025-03-13 PROCEDURE — 99214 OFFICE O/P EST MOD 30 MIN: CPT

## 2025-03-13 RX ORDER — ISOPROPYL ALCOHOL, BENZOCAINE .7; .06 ML/ML; ML/ML
0 SWAB TOPICAL
Qty: 100 | Refills: 1
Start: 2025-03-13

## 2025-03-13 RX ORDER — INSULIN GLARGINE-YFGN 100 [IU]/ML
14 INJECTION, SOLUTION SUBCUTANEOUS
Qty: 2 | Refills: 2
Start: 2025-03-13 | End: 2025-06-10

## 2025-03-13 RX ADMIN — AMLODIPINE BESYLATE 5 MILLIGRAM(S): 10 TABLET ORAL at 05:42

## 2025-03-13 NOTE — ED CDU PROVIDER DISPOSITION NOTE - CLINICAL COURSE
80-year-old female with past medical history of hypertension, hyperlipidemia presents to the ER with complaint of diabetes on lispro 2 units Premeal and Lantus 18 units nightly episodes of diarrhea, vomiting, abdominal cramping and cramping in her arms and legs.  Patient abdominal exam is benign labs grossly unchanged from previous with noted hemoglobin of 10.3 no leukocytosis.  K initially 3.2 with repeat 4.1, phosphorus initially 2.5 down to 3.0 magnesium normal at 2.0, A1c 8.  Negative for flu, COVID, RSV.  Patient feeling improvement of symptoms after Tylenol, fluids, Phos-Nak.  Endocrine consulted for persistent episodes of hypoglycemia.  Patient initially 67, dextrose given still with episode of hypoglycemia. Pt now off dextrose gtt overnight and all day today. FS have been stable. Endo recommends 14 units of lantus at bedtime instead of 36. Continue with metformin. CGM not covered by insurance - will send glucometer for pt to check FS. Pt stable for d/c at this time, pt feels well enough to go home.

## 2025-03-13 NOTE — ED CDU PROVIDER DISPOSITION NOTE - PRINCIPAL DIAGNOSIS
[address] [facility]  Hospitalist Group                                                                                          Critical Care Progress Note  Julio Leon MD  Answering service: [phone] from in house phone        Date of Service:  2020  NAME:  Lulú Pantoja  :  1980  MRN:  [id number]      Interval history / Subjective:    Patient reports feeling the same as yesterday. Still reports pleuritic chest pain, SOB and cough. Assessment & Plan:     -Sever sepsis. From pneumococcal pneumonia.  -Pneumococcal pneumonia.  -Pneumococcal bacteremia.  -Influenza B resp infection. -?UTI.  -Acute liver failure. Unclear etiology. ? Shock liver, she had hypotension on presentation, did not require vasopressors. ? Alcoholic hepatitis - although patient denies heavy alcohol abuse. Viral hep panel neg.  -Acute renal failure. Likely pre renal/ATN in setting of sever sepsis. -Lactic acidosis. -Coagulopathy. Likely from acute liver failure. Vit K given overnight      Plan:  -Continue rocephin azithro and tamiflu. -Repeat blood cultures.  -Echo is unremarkable.  -On NAC therapy for acute hapatitis. -Patient's sister has h/o lupus, although ESR is normal. SWAPNIL, dsDNA, complement levels pending.  -Continue duoneb Q4hrs.  -She is on prednisone rx for suspected alcoholic hepatitis. -Monitor liver function.  -Increase LR to 150ml/hr.  -Monitor I/O. -Monitor coag panel.  -Monitor DIC labs. -Closely monitor hemodynamics.  -She is critically ill and has very high chances of decompensation.  -Plan discussed with patient in detail. Code status: Full  DVT prophylaxis: SCD. Care Plan discussed with: Patient/Family, Nurse and   Disposition: Continue care in ICU. 60 minutes of critical care time spent excluding procedures.        Hospital Problems  Date Reviewed: 2019          Codes Class Noted POA    Liver failure [facility]) ICD-10-CM: K72.90  ICD-9-CM: 572.8  2020 Unknown                Review of Systems:   A comprehensive review of systems was negative except for that written in the HPI. Vital Signs:    Last 24hrs VS reviewed since prior progress note. Most recent are:  Visit Vitals  /84   Pulse (!) 116   Temp 99.1 °F (37.3 °C)   Resp 20   Ht 5' 7\" (1.702 m)   Wt 78.8 kg (173 lb 11.6 oz)   SpO2 92%   Breastfeeding No   BMI 27.21 kg/m²         Intake/Output Summary (Last 24 hours) at 2/21/2020 1005  Last data filed at 2/21/2020 7999  Gross per 24 hour   Intake 5741.87 ml   Output 3235 ml   Net 2506.87 ml        Physical Examination:             Constitutional:  No acute distress, cooperative, pleasant    ENT:  Oral mucous moist, oropharynx benign. Resp:  wheezing and rhonchi kit. No accessory muscle use   CV:  Regular rhythm, normal rate, no murmurs, gallops, rubs    GI:  Soft, non distended, non tender. normoactive bowel sounds, no hepatosplenomegaly     Musculoskeletal:  No edema, warm, 2+ pulses throughout    Neurologic:  Moves all extremities. AAOx3, CN II-XII reviewed           Data Review:    Review and/or order of clinical lab test      Labs:     Recent Labs     02/21/20  0543 02/20/20  0208   WBC 6.1 4.0   HGB 9.1* 11.2*   HCT 25.4* 33.3*   * 105*     Recent Labs     02/21/20  0543 02/20/20  2322 02/20/20  1708  02/20/20  0208  02/19/20  1648    136 132*  132*   < > 130*   < > 129*   K 3.1* 2.6* 3.1*  3.0*   < > 4.0   < > 4.9   CL 99 98 98  98   < > 98   < > 98   CO2 29 28 20*  19*   < > 16*   < > 15*   BUN 38* 41* 44*  44*   < > 39*   < > 34*   CREA 2.46* 2.73* 3.33*  3.25*   < > 4.63*   < > 4.94*   * 233* 203*  200*   < > 157*   < > 68   CA 7.8* 7.4* 8.0*  7.8*   < > 7.6*   < > 9.1   MG 1.6  --   --   --   --   --  2.0   PHOS 1.3*  --   --   --  3.0  --   --     < > = values in this interval not displayed.      Recent Labs     02/20/20  2322 02/20/20  1708 02/20/20  1028   SGOT >2,000* >2,000*  >2,000* >2,000*   ALT 1,600* 2,056*  2,049* 2,229*    123*  123* 128*   TBILI 6.6* 7.5*  7.6* 7.0*   TP 4.4* 4.8*  4.8* 4.7*   ALB 2.1* 2.2*  2.3* 2.2*   GLOB 2.3 2.6  2.5 2.5     Recent Labs     02/21/20  0543 02/20/20  2322 02/20/20  1025   INR 1.4* 1.5* 1.8*   PTP 13.7* 14.9* 17.4*      Recent Labs     02/20/20  1647 02/19/20  2100   TIBC  --  254   PSAT  --  95*   FERR 2,358*  --       Lab Results   Component Value Date/Time    Folate 8.4 05/23/2019 04:05 PM      No results for input(s): PH, PCO2, PO2 in the last 72 hours.   Recent Labs     02/20/20  1028 02/20/20  0208 02/19/20  2100   CPK  --  182  --    TROIQ <0.05  --  <0.05     Lab Results   Component Value Date/Time    Cholesterol, total 184 06/04/2019 10:17 AM    HDL Cholesterol 44 06/04/2019 10:17 AM    LDL, calculated Comment 06/04/2019 10:17 AM    Triglyceride 774 (HH) 06/04/2019 10:17 AM     No results found for: [facility]  Lab Results   Component Value Date/Time    Color BROWN 02/19/2020 10:11 PM    Appearance CLEAR 02/19/2020 10:11 PM    Specific gravity 1.025 02/19/2020 10:11 PM    Specific gravity 1.023 01/22/2019 08:21 AM    pH (UA) 6.5 02/19/2020 10:11 PM    Protein 100 (A) 02/19/2020 10:11 PM    Glucose 100 (A) 02/19/2020 10:11 PM    Ketone 15 (A) 02/19/2020 10:11 PM    Bilirubin NEGATIVE  01/22/2019 08:21 AM    Urobilinogen 2.0 (H) 02/19/2020 10:11 PM    Nitrites POSITIVE (A) 02/19/2020 10:11 PM    Leukocyte Esterase TRACE (A) 02/19/2020 10:11 PM    Epithelial cells MODERATE (A) 02/19/2020 10:11 PM    Bacteria 1+ (A) 02/19/2020 10:11 PM    WBC 0-4 02/19/2020 10:11 PM    RBC 20-50 02/19/2020 10:11 PM         Medications Reviewed:     Current Facility-Administered Medications   Medication Dose Route Frequency    lactated Ringers infusion  100 mL/hr IntraVENous CONTINUOUS    potassium phosphate 15 mmol in 0.9% sodium chloride 250 mL infusion   IntraVENous ONCE    vancomycin (VANCOCIN) 1,000 mg in 0.9% sodium chloride (MBP/ADV) 250 mL  1,000 mg IntraVENous Diarrhea ONCE    oseltamivir (TAMIFLU) capsule 30 mg  30 mg Oral Q12H    docusate (COLACE) 50 mg/5 mL oral liquid 100 mg  100 mg Oral BID PRN    Vancomycin Pharmacy Dosing   Other Rx Dosing/Monitoring    folic acid (FOLVITE) tablet 1 mg  1 mg Oral DAILY    thiamine HCL (B-1) tablet 100 mg  100 mg Oral DAILY    therapeutic multivitamin (THERAGRAN) tablet 1 Tab  1 Tab Oral DAILY    famotidine (PEPCID) tablet 20 mg  20 mg Oral DAILY    cefTRIAXone (ROCEPHIN) 2 g in 0.9% sodium chloride (MBP/ADV) 50 mL  2 g IntraVENous Q24H    azithromycin (ZITHROMAX) 500 mg in 0.9% sodium chloride (MBP/ADV) 250 mL  500 mg IntraVENous Q24H    albuterol-ipratropium (DUO-NEB) 2.5 MG-0.5 MG/3 ML  3 mL Nebulization Q4H RT    budesonide (PULMICORT) 500 mcg/2 ml nebulizer suspension  500 mcg Nebulization BID RT    acetylcysteine (ACETADOTE) 7,460 mg in dextrose 5% 1,000 mL infusion  100 mg/kg IntraVENous CONTINUOUS    sodium chloride (NS) flush 5-10 mL  5-10 mL IntraVENous PRN    sodium chloride (NS) flush 5-40 mL  5-40 mL IntraVENous Q8H    sodium chloride (NS) flush 5-40 mL  5-40 mL IntraVENous PRN    ondansetron (ZOFRAN) injection 4 mg  4 mg IntraVENous Q4H PRN    prednisoLONE (ORAPRED) 15 mg/5 mL (3 mg/mL) solution 40 mg  40 mg Oral DAILY     ______________________________________________________________________  EXPECTED LENGTH OF STAY: 4d 19h  ACTUAL LENGTH OF STAY:          2                 Jcarlos Valladares MD

## 2025-03-13 NOTE — PROGRESS NOTE ADULT - ASSESSMENT
A/P: 80 yr old Female with hx of HTN, T2DM, HLD presenting to the emergency department with several hours of diarrhea, likely secondary to viral gastroenteritis. Patient with poor PO intake in context of illness and with a couple episodes of hypoglycemia in context of receiving her usual home insulin prior to admission, endocrinology consulted for further management of T2DM    #Poorly controlled T2DM with hyperglycemia  - HbA1c: 8.0 % on 2/13/2025----> 9.6% on 2/12/25  - Home Regimen: basaglar 36 units qhs and metformin 1000 mg daily  - Endocrinologist: none    INPATIENT PLAN:   - Goal -180 mg/dl: at goal off all insulin, appetite is low.   - Continue low dose Admelog correction scale pre-meal  - Continue low dose Admelog correction scale at bedtime  - Fingerstick BG before meals and bedtime, Q 6hrs if NPO  - Carbohydrate consistent diet  - RD consult    DISCHARGE PLAN:   - Discharge medications: Insulin Glargine units SQ at bedtime tonight. Resume Metformin 1000mg PO daily.   - CGM not covered by insurance. Glucometer blood sugar checks before meals and at bedtime.   - Patient to call doctor with persistent high or low BG at home.   - Ensure patient has glucometer, test strips and lancets on discharge.  - Recommend routine outpatient ophthalmology and podiatry follow up.     If patient wishes to follow up with [facility], please provide her with below information:  [facility]:  [address] [place] [address]. [address]. [place] [address]  Tel: [phone]      #HTN  - Outpatient goal BP <130/80.  - Can check urine albumin/creatinine outpatient  - Management per primary team.    #HLD  - LDL goal < 70  - Would likely benefit from a statin if no contraindication  - Can check lipid profile if not done         A/P: 80 yr old Female with hx of HTN, T2DM, HLD presenting to the emergency department with several hours of diarrhea, likely secondary to viral gastroenteritis. Patient with poor PO intake in context of illness and with a couple episodes of hypoglycemia in context of receiving her usual home insulin prior to admission, endocrinology consulted for further management of T2DM    #Poorly controlled T2DM with hyperglycemia  - HbA1c: 8.0 % on 2/13/2025----> 9.6% on 2/12/25  - Home Regimen: basaglar 36 units qhs and metformin 1000 mg daily  - Endocrinologist: none    INPATIENT PLAN:   - Goal -180 mg/dl: at goal off all insulin, appetite is low.   - Continue low dose Admelog correction scale pre-meal  - Continue low dose Admelog correction scale at bedtime  - Fingerstick BG before meals and bedtime, Q 6hrs if NPO  - Carbohydrate consistent diet  - RD consult    DISCHARGE PLAN:   - Discharge medications: Insulin Glargine 14 units SQ at bedtime tonight. Resume Metformin 1000mg PO daily.   - See PCP in 1-2 weeks for follow up.   - CGM not covered by insurance. Glucometer blood sugar checks before meals and at bedtime.   - Patient to call doctor with persistent high or low BG at home.   - Ensure patient has glucometer, test strips and lancets on discharge.  - Recommend routine outpatient ophthalmology and podiatry follow up.     If patient wishes to follow up with [facility], please provide her with below information:  [facility]:  [address] [place] [address]. [address]. [place] [address]  Tel: [phone]      #HTN  - Outpatient goal BP <130/80.  - Can check urine albumin/creatinine outpatient  - Management per primary team.    #HLD  - LDL goal < 70  - Would likely benefit from a statin if no contraindication  - Can check lipid profile if not done

## 2025-03-13 NOTE — ED CDU PROVIDER DISPOSITION NOTE - NSFOLLOWUPINSTRUCTIONS_ED_ALL_ED_FT
A copy of the results from today's visit is provided to you.     Please follow-up with an endocrinologist. Call the number below to make an appointment.   [facility]:  [address] [place] [address]. [address]. [place] [address]  Tel: [phone]    Take 14 units of the Lantus at bedtime. Continue taking Metformin 1000mg (two 500mg tablets) TWICE a day.    Check your sugars before each meal and at bedtime. Glucose tablets were sent if your sugar is too low (below 80).    Return to the ED for any new or worsening symptoms.     Hypoglycemia in a Person with Diabetes    WHAT YOU NEED TO KNOW:  Hypoglycemia is a serious condition that happens when your blood glucose (sugar) level drops too low. The blood sugar level is usually too high in a person with diabetes, but the level can also drop too low. It is important to follow your diabetes management plan to keep your blood sugar level steady.  DISCHARGE INSTRUCTIONS:  You or someone close to you needs to call the local emergency number (911 in the [place]) if:  You have a seizure or pass out.  Your blood sugar is less than 50 mg/dL and does not respond to treatment.  You feel you are going to pass out.  You have trouble thinking clearly.  Call your diabetes care team if:  You have had symptoms of low blood sugar several times.  You have questions about the amount of insulin or diabetes medicine you are taking.  You have questions or concerns about your condition or care.  Medicines:  Insulin or diabetes medicine help to keep your blood sugar under control.  Glucagon may be needed if you have severe hypoglycemia.  Take your medicine as directed. Contact your healthcare provider if you think your medicine is not helping or if you have side effects. Tell him or her if you are allergic to any medicine. Keep a list of the medicines, vitamins, and herbs you take. Include the amounts, and when and why you take them. Bring the list or the pill bottles to follow-up visits. Carry your medicine list with you in case of an emergency.  Manage hypoglycemia  Check your blood sugar level right away if you have symptoms of hypoglycemia. Hypoglycemia is usually 70 mg/dL or below. Ask your diabetes care team what blood sugar level is too low for you.  If your blood sugar level is too low, eat or drink 15 grams of fast-acting carbohydrate. Examples of this amount of fast-acting carbohydrate are 4 ounces (½ cup) of fruit juice or 4 ounces of regular soda. Other examples are 2 tablespoons of raisins or 1 tube of glucose gel.  Ways to Raise Your Blood Sugar  Check your blood sugar level 15 minutes later. Sit still as you wait. If the level is still low (less than 100 mg/dL), have another 15 grams of carbohydrate. When the level returns to 100 mg/dL, eat a meal if it is time. If your meal time is more than 1 hour away, eat a snack. The snack should contain carbohydrates, such as the following:  3/4 cup of cereal  1 cup of skim or low fat milk  6 soda crackers  1/2 of a turkey sandwich  15 fat-free chips  This will help prevent another drop in blood sugar. Always carefully follow your diabetes care team's instructions on how to treat low blood sugar levels.  Always carry a source of fast-acting carbohydrate. If you have symptoms of hypoglycemia and you do not have a blood glucose meter, have a source of fast-acting carbohydrate anyway. Avoid carbohydrate foods that are high in fat. The fat content may make the carbohydrate take longer to increase your blood sugar level. Ask your diabetes care team if you should carry a glucagon kit. Glucagon is a medicine that is injected when you develop severe hypoglycemia and become unconscious. Check the expiration date every month and replace it before it expires.  Teach others how to help you if you have symptoms of hypoglycemia. Tell them about the symptoms of hypoglycemia. Ask them to give you a source of fast-acting carbohydrate if you cannot get it yourself. Ask them to give you a glucagon injection if you have signs of hypoglycemia and you become unconscious or have a seizure. Ask them to call the local emergency number (911 in the ). This is an emergency. Tell them never to try to make you swallow anything if you faint or have a seizure.  Wear medical alert jewelry or carry a card that says you have diabetes. Ask where to get these items.  Medical Alert Jewelry  Prevent hypoglycemia:  Take diabetes medicine as directed. Take your medicine at the right time and in the right amount. Do not double the amount of medicine you take unless instructed by your diabetes care team.  Eat regular meals and snacks. Talk to your dietitian or diabetes care team about a meal plan that is right for you. Do not skip meals.  Check your blood sugar level as directed. Ask your diabetes care team what your blood sugar levels should be before and after you eat. Ask when and how often to check your blood sugar level. You may need to check at least 3 times each day. Record your blood sugar level results and take the record with you when you see your care team. Changes may need to be made to your medicine, food, or exercise schedules using the record.  How to check your blood sugar  Check your blood sugar level before you exercise. Physical activity, such as exercise can decrease your blood sugar level. If your blood sugar level is less than 100 mg/dL, have a carbohydrate snack. Examples are 4 to 6 crackers, ½ banana, 8 ounces (1 cup) of nonfat or 1% milk, or 4 ounces (½ cup) of juice. If you will be active for more than 1 hour, you may need to check your blood sugar level every 30 minutes. Your diabetes care team may also recommend that you check your blood sugar level after your activity  Know the risks if you choose to drink alcohol. Alcohol can cause your blood sugar levels to be low if you use insulin. Alcohol can cause high blood sugar levels and weight gain if you drink too much. Women 21 years or older and men 65 years or older should limit alcohol to 1 drink a day. Men aged 21 to 64 years should limit alcohol to 2 drinks a day. A drink of alcohol is 12 ounces of beer, 5 ounces of wine, or 1½ ounces of liquor.  Follow up with your diabetes care team as directed: Write down your questions so you remember to ask them during your visits.

## 2025-03-13 NOTE — ED CDU PROVIDER DISPOSITION NOTE - ATTENDING CONTRIBUTION TO CARE
I have reviewed the above disposition plan and approve of it in its entirety. Endocrine reccs noted to be dced with followup and current plan to RTED PRN

## 2025-03-13 NOTE — ED CDU PROVIDER DISPOSITION NOTE - NSFOLLOWUPCLINICS_GEN_ALL_ED_FT
[facility]  Endocrinology  [address] [place] [address]  Phone: [phone]  Fax:

## 2025-03-13 NOTE — ED CDU PROVIDER SUBSEQUENT DAY NOTE - CLINICAL SUMMARY MEDICAL DECISION MAKING FREE TEXT BOX
Pt is a 81 YO F with PMH HTN, HLD, DM2 who presented to ED with diarrhea, abdominal pain, muscle cramps. Pt reports decreased PO intake and continued diabetic regimen. In ED, K 3.2-->4.1, phosphorus 2.5-->3. Pt noted to be hypoglycemic 67. Pt placed in CDU for glycemic control, endocrinology eval.

## 2025-03-13 NOTE — ED CDU PROVIDER DISPOSITION NOTE - PATIENT PORTAL LINK FT
You can access the FollowMyHealth Patient Portal offered by [facility] by registering at the following website: [email or web address]. By joining [employer]’s FollowMyHealth portal, you will also be able to view your health information using other applications (apps) compatible with our system.

## 2025-03-13 NOTE — ED ADULT NURSE REASSESSMENT NOTE - NS ED NURSE REASSESS COMMENT FT1
Checked pt's FS, as pt is a diabetic. FS 67, notified provider of abnml results. 3 cups of OJ given to pt. Pt denies any sx at this time. Will recheck FS in 15 mins.
Pt has poor po intake, pt borderline hypoglycemic on BMP result, dextrose 5% infusion initiated as per MD orders, will reassess blood glucose, pt asymptomatic, denies any present complaints, ivl clear and patent, pt resting comfortably in stretcher, will continue to monitor pt.
Pt's FS dropped to 66 despite eating a sandwich. Notified MD, awaiting D5 order. Pt denies any sx, appears lethargic but easily arousable to voice.
Pt's FS improved to 211 s/p amp of D50. Notified provider of results. Pt is in stable condition, appears more alert.
break coverage rn. received report from JERI hernandez. pt A&Ox4, vitally stable. denies chest pain, SOB ,headache, dizziness, numbness/tingling to hands/feet. pt pending pickup. IV site remained until discharge. safety maintained call bell within reach

## 2025-03-13 NOTE — ED CDU PROVIDER SUBSEQUENT DAY NOTE - ATTENDING APP SHARED VISIT CONTRIBUTION OF CARE
I have made the decision to admit this patient to the CDU as documented in my Provider note I have reviewed the note  written by the CDU Physician Assistant, on that visit day. I have supervised and participated as necessary in the performance of procedures indicated for patient management and was available at all phases of the patient´s visit when needed.    Please see the  provider note for the details of the decision to admit  Vital Signs Last 24 Hrs  T(C): 36.7 (13 Mar 2025 09:00), Max: 36.7 (12 Mar 2025 18:22)  T(F): 98.1 (13 Mar 2025 09:00), Max: 98.1 (12 Mar 2025 18:22)  HR: 68 (13 Mar 2025 09:00) (67 - 98)  BP: 147/69 (13 Mar 2025 09:00) (147/69 - 174/82)  BP(mean): --  RR: 18 (13 Mar 2025 09:00) (18 - 18)  SpO2: 100% (13 Mar 2025 09:00) (98% - 100%)    Parameters below as of 13 Mar 2025 09:00  Patient On (Oxygen Delivery Method): room air      PE unchanged at time of admission  Patient persistently normo slightly hyperglycemic off glucose infusions for hyperglycemia await final reccs; Patient feels better but still notes decreased PO/appetite; encouragement offered  Will reassess      I have made the decision to discharge this patient to the CDU as documented in my Provider note I have reviewed the note  written by the CDU Physician Assistant, on that visit day. I have supervised and participated as necessary in the performance of procedures indicated for patient management and was available at all phases of the patient´s visit when needed.    Patient cleared by cardiology for d/c echo normal   d/c with followup   RTED PRN

## 2025-03-13 NOTE — ED CDU PROVIDER SUBSEQUENT DAY NOTE - HISTORY
Pt is a 81 YO F with PMH HTN, HLD, DM2 who presented to ED with diarrhea, abdominal pain, muscle cramps. Pt reports decreased PO intake and continued diabetic regimen. In ED, K 3.2-->4.1, phosphorus 2.5-->3. Pt noted to be hypoglycemic 67. Pt placed in CDU for glycemic control, endocrinology eval.   In the interim- pt was taken of dextrose gtt and FS improving.

## 2025-03-13 NOTE — PROGRESS NOTE ADULT - SUBJECTIVE AND OBJECTIVE BOX
Chief Complaint: T2DM c/b hypoglycemia    Interval Events: Pt seen and examined at bedside earlier today.  She reports diarrhea has subsided. Appetite is low, reports she did not eat much breakfast.     MEDICATIONS  (STANDING):  amLODIPine   Tablet 5 milliGRAM(s) Oral daily  atorvastatin 40 milliGRAM(s) Oral at bedtime  dextrose 5%. 1000 milliLiter(s) (50 mL/Hr) IV Continuous <Continuous>  dextrose 5%. 1000 milliLiter(s) (100 mL/Hr) IV Continuous <Continuous>  dextrose 50% Injectable 25 Gram(s) IV Push once  dextrose 50% Injectable 12.5 Gram(s) IV Push once  dextrose 50% Injectable 25 Gram(s) IV Push once  dextrose Oral Gel 15 Gram(s) Oral once  glucagon  Injectable 1 milliGRAM(s) IntraMuscular once    MEDICATIONS  (PRN):      Allergies    No Known Allergies    Intolerances      Review of Systems:  Eyes: No blurry vision  Cardiovascular: No chest pain, palpitations  Respiratory: No SOB, no cough  GI: No nausea, vomiting, abdominal pain  : No dysuria    VITALS: T(C): 36.7 (03-13-25 @ 09:00)  T(F): 98.1 (03-13-25 @ 09:00), Max: 98.1 (03-12-25 @ 18:22)  HR: 68 (03-13-25 @ 09:00) (67 - 98)  BP: 147/69 (03-13-25 @ 09:00) (147/69 - 174/82)  RR:  (18 - 18)  SpO2:  (98% - 100%)  Wt(kg): --      Physical Exam:   GENERAL: NAD, well-developed  EYES: No proptosis  HEENT:  Atraumatic, Normocephalic  RESPIRATORY: non labored breathing   GI: Non distended  PSYCH: Alert, normal affect, normal mood    CAPILLARY BLOOD GLUCOSE    POCT Blood Glucose.: 158 mg/dL (13 Mar 2025 11:43)  POCT Blood Glucose.: 145 mg/dL (13 Mar 2025 07:40)  POCT Blood Glucose.: 213 mg/dL (13 Mar 2025 03:03)  POCT Blood Glucose.: 209 mg/dL (12 Mar 2025 22:53)  POCT Blood Glucose.: 221 mg/dL (12 Mar 2025 21:09)  POCT Blood Glucose.: 186 mg/dL (12 Mar 2025 16:48)  POCT Blood Glucose.: 152 mg/dL (12 Mar 2025 14:14)      03-13    136  |  106  |  8   ----------------------------<  146[H]  3.8   |  22  |  0.78    eGFR: 77    Ca    8.8      03-13  Mg     2.00     03-12  Phos  3.0     03-12    TPro  6.9  /  Alb  3.4  /  TBili  0.4  /  DBili  x   /  AST  21  /  ALT  11  /  AlkPhos  110  03-13      A1C with Estimated Average Glucose Result: 8.0 % (03-12-25 @ 05:00)  A1C with Estimated Average Glucose Result: 9.6 % (02-12-25 @ 10:00)  A1C with Estimated Average Glucose Result: 7.6 % (04-13-24 @ 07:00)      Thyroid Function Tests:

## 2025-03-13 NOTE — ED CDU PROVIDER SUBSEQUENT DAY NOTE - CPE EDP HEME LYMPH NORM
Gestational Diabetes Follow-up    Subjective/Objective:    Paulo Lema sent in blood glucose log for review. Last date of communication was: 10/02.    Gestational diabetes is being managed with Humulin/Novolin NPH Insulin 3 units at bedtime    Estimated Date of Delivery: Nov 18, 2017    BG/Food Log:        Assessment:    Ketones:not checked.   Fasting blood glucoses: 50% in target past 4 days, only 42% in goal past 7 days.  After breakfast: 75% in target.  After lunch: 100% in target.  After dinner: 100% in target.    Plan/Response:  Recommend increase to insulin - increase NPH 0-0-0-3 --> 0-0-0-4  units at HS with trend toward elevated fasting.    Reply to Paulo:    Dave Bates,     Thanks for the updated info,  so close to goal J  You are doing great!    Let s have you increase to 4 units NPH tonight. If either Friday, Saturday, or Sunday morning number is over 95, go ahead and increase to 5 units.  Can you please send in a snapshot again on Monday?     Have a super weekend!  Nancy Savage RD, LD, CDE      Any diabetes medication dose changes were made via the CDE Protocol and Collaborative Practice Agreement with the patient's referring provider. A copy of this encounter was shared with the provider.       normal...

## 2025-03-14 LAB — GLUCOSE BLDC GLUCOMTR-MCNC: 184 MG/DL — HIGH (ref 70–99)

## 2025-03-28 NOTE — PATIENT PROFILE ADULT - FUNCTIONAL ASSESSMENT - BASIC MOBILITY 3.
Subjective:   Cherrie Saucedo is a 9 y.o. female who presents for Ear Pain (Ear pain starting in left ear now in both ears x 1 week)      Patient presents with her mother with complaints of 2-week history of congestion, cough, and newer onset 1 week history of ear pain.  States that pain initially started left ear though now is in the right and severe causing patient to cry.  Mother's been watching her fever states it has been low-grade about 99 and giving Tylenol and ibuprofen as needed.  No other sick contacts.  She denies any wheezing stridor or shortness of breath        Review of Systems   Constitutional:  Positive for fever. Negative for chills.   HENT:  Positive for congestion and ear pain. Negative for ear discharge, hearing loss, sinus pain, sore throat and tinnitus.    Eyes:  Negative for discharge and redness.   Respiratory:  Positive for cough.    Cardiovascular:  Negative for chest pain.   Musculoskeletal:  Negative for myalgias.   Neurological:  Negative for headaches.     Refer to [facility] [place] for additional details.    During this visit, appropriate PPE was worn, and hand hygiene was performed.    PMH:  has a past medical history of Healthy infant and Rash.    She has no past medical history of Encounter for long-term (current) use of other medications.    MEDS:   Current Outpatient Medications:     amoxicillin (AMOXIL) 400 mg/5 mL suspension, Take 6.3 mL by mouth 2 times a day for 7 days., Disp: 88.2 mL, Rfl: 0    prednisoLONE sodium phosphate (PEDIAPRED) 15 mg/5mL oral solution, Take 7.4 mL by mouth every day for 3 days., Disp: 22.2 mL, Rfl: 0    fluticasone (FLONASE) 50 MCG/ACT nasal spray, Administer 1 Spray into affected nostril(S) every day., Disp: 16 g, Rfl: 0    diphenhydrAMINE (BENADRYL) 12.5 MG/5ML Liquid liquid, Take 5 mL by mouth 4 times a day as needed. (Patient not taking: Reported on 3/28/2025), Disp: 1 Bottle, Rfl: 0    acetaminophen (TYLENOL) 160 MG/5ML liquid, Take 2 mL by mouth  "every four hours as needed for Mild Pain, Fever or Headache. (Patient not taking: Reported on 3/28/2025), Disp: 1 Bottle, Rfl: 2    ALLERGIES: No Known Allergies  SURGHX: History reviewed. No pertinent surgical history.  SOCHX:      FH: Per HPI as applicable/pertinent.    Medications, Allergies, and current problem list reviewed today in Epic.     Objective:     Pulse 91   Temp 37.2 °C (99 °F) (Temporal)   Resp 20   Ht 1.323 m (4' 4.09\")   Wt 22.3 kg (49 lb 1.6 oz)   SpO2 96%     Physical Exam  Constitutional:       General: She is active. She is not in acute distress.     Appearance: Normal appearance. She is normal weight.   HENT:      Head: Normocephalic.      Right Ear: Tympanic membrane is erythematous and bulging.      Left Ear: Tympanic membrane is not erythematous or bulging.      Nose: Congestion and rhinorrhea present.      Mouth/Throat:      Mouth: Mucous membranes are moist.      Pharynx: Oropharynx is clear. Posterior oropharyngeal erythema present. No oropharyngeal exudate.   Eyes:      General:         Right eye: No discharge.         Left eye: No discharge.      Pupils: Pupils are equal, round, and reactive to light.   Cardiovascular:      Rate and Rhythm: Normal rate.      Pulses: Normal pulses.   Pulmonary:      Effort: Pulmonary effort is normal. No respiratory distress, nasal flaring or retractions.      Breath sounds: No stridor or decreased air movement. No wheezing, rhonchi or rales.   Musculoskeletal:      Cervical back: Normal range of motion. No tenderness.   Lymphadenopathy:      Cervical: No cervical adenopathy.   Neurological:      Mental Status: She is alert.         Assessment/Plan:     Diagnosis and associated orders:     1. Acute nonsuppurative otitis media of right ear  - amoxicillin (AMOXIL) 400 mg/5 mL suspension; Take 6.3 mL by mouth 2 times a day for 7 days.  Dispense: 88.2 mL; Refill: 0    2. Acute cough  - prednisoLONE sodium phosphate (PEDIAPRED) 15 mg/5mL oral solution; " Take 7.4 mL by mouth every day for 3 days.  Dispense: 22.2 mL; Refill: 0    3. Nasal congestion with rhinorrhea  - fluticasone (FLONASE) 50 MCG/ACT nasal spray; Administer 1 Spray into affected nostril(S) every day.  Dispense: 16 g; Refill: 0     Comments/MDM:     Patient history and physical exam are consistent with right nonsuppurative otitis media with concomitant congestion, rhinorrhea, and cough.  Patient presents well in clinic no red flag symptoms no acute distress  Discussed HPI and physical exam with mother.  Explained that with prolonged duration of symptoms and examination findings a year ago of high suspicion for likely superimposed bacterial otitis media.  This point we will treat supportively for symptoms and with empiric antibiotics for otitis media.  Outpatient management will consist of fluids, staggered Motrin and Tylenol for ear pain, Flonase for nasal decongestion, short course prednisolone for cough and inflammation, amoxicillin twice daily x 7 days  Follow up in 3-5 days if no improvement in symptoms           Differential diagnosis, natural history, supportive care, and indications for immediate follow-up discussed.    Advised the patient to follow-up with the primary care physician for recheck, reevaluation, and consideration of further management.    Please note that this dictation was created using voice recognition software. I have made a reasonable attempt to correct obvious errors, but I expect that there are errors of grammar and possibly content that I did not discover before finalizing the note.    This note was electronically signed by LELO Oates       3 = A little assistance

## 2025-04-09 ENCOUNTER — EMERGENCY (EMERGENCY)
Facility: HOSPITAL | Age: 80
LOS: 0 days | Discharge: HOME | End: 2025-04-09
Payer: COMMERCIAL

## 2025-04-09 VITALS
HEART RATE: 82 BPM | TEMPERATURE: 98 F | OXYGEN SATURATION: 96 % | RESPIRATION RATE: 19 BRPM | SYSTOLIC BLOOD PRESSURE: 164 MMHG | DIASTOLIC BLOOD PRESSURE: 85 MMHG

## 2025-04-09 VITALS
OXYGEN SATURATION: 97 % | WEIGHT: 162.92 LBS | HEIGHT: 66 IN | SYSTOLIC BLOOD PRESSURE: 119 MMHG | HEART RATE: 84 BPM | TEMPERATURE: 98 F | DIASTOLIC BLOOD PRESSURE: 82 MMHG | RESPIRATION RATE: 18 BRPM

## 2025-04-09 DIAGNOSIS — N60.02 SOLITARY CYST OF LEFT BREAST: Chronic | ICD-10-CM

## 2025-04-09 DIAGNOSIS — M25.511 PAIN IN RIGHT SHOULDER: ICD-10-CM

## 2025-04-09 DIAGNOSIS — Z98.89 OTHER SPECIFIED POSTPROCEDURAL STATES: Chronic | ICD-10-CM

## 2025-04-09 DIAGNOSIS — S52.90XA UNSPECIFIED FRACTURE OF UNSPECIFIED FOREARM, INITIAL ENCOUNTER FOR CLOSED FRACTURE: Chronic | ICD-10-CM
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
## 2025-04-09 DIAGNOSIS — M25.562 PAIN IN LEFT KNEE: ICD-10-CM
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
## 2025-04-09 DIAGNOSIS — Z96.7 PRESENCE OF OTHER BONE AND TENDON IMPLANTS: Chronic | ICD-10-CM

## 2025-04-09 DIAGNOSIS — S82.044A NONDISPLACED COMMINUTED FRACTURE OF RIGHT PATELLA, INITIAL ENCOUNTER FOR CLOSED FRACTURE: ICD-10-CM
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
## 2025-04-09 DIAGNOSIS — S80.02XA CONTUSION OF LEFT KNEE, INITIAL ENCOUNTER: ICD-10-CM

## 2025-04-09 DIAGNOSIS — Z98.51 TUBAL LIGATION STATUS: Chronic | ICD-10-CM

## 2025-04-09 DIAGNOSIS — Z87.81 PERSONAL HISTORY OF (HEALED) TRAUMATIC FRACTURE: Chronic | ICD-10-CM

## 2025-04-09 DIAGNOSIS — M25.561 PAIN IN RIGHT KNEE: ICD-10-CM

## 2025-04-09 PROCEDURE — 73030 X-RAY EXAM OF SHOULDER: CPT | Mod: 26,RT

## 2025-04-09 PROCEDURE — 99285 EMERGENCY DEPT VISIT HI MDM: CPT

## 2025-04-09 PROCEDURE — 73562 X-RAY EXAM OF KNEE 3: CPT | Mod: 26,50

## 2025-04-09 PROCEDURE — 73600 X-RAY EXAM OF ANKLE: CPT | Mod: 26,RT

## 2025-04-09 RX ORDER — ACETAMINOPHEN 500 MG/5ML
650 LIQUID (ML) ORAL ONCE
Refills: 0 | Status: COMPLETED | OUTPATIENT
Start: 2025-04-09 | End: 2025-04-09

## 2025-04-09 RX ORDER — IBUPROFEN 200 MG
600 TABLET ORAL ONCE
Refills: 0 | Status: COMPLETED | OUTPATIENT
Start: 2025-04-09 | End: 2025-04-09

## 2025-04-09 RX ADMIN — Medication 650 MILLIGRAM(S): at 10:07

## 2025-04-09 RX ADMIN — Medication 600 MILLIGRAM(S): at 10:07

## 2025-04-09 NOTE — ED PROVIDER NOTE - LOWER EXTREMITY EXAM, RIGHT
+ Right knee w/ small superficial abrasion / swelling / ecchymosis and ttp overlying the anterior right knee, decreased ROM of right knee due to pain, no other bony ttp./SWELLING/TENDERNESS

## 2025-04-09 NOTE — ED PROVIDER NOTE - CARE PROVIDER_API CALL
Mauricio Wilson.  Orthopaedic Surgery  [address] [facility], [address] [place] [id number]  Phone: [phone]  Fax: [phone]  Follow Up Time: 7-10 Days

## 2025-04-09 NOTE — ED PROVIDER NOTE - NSICDXPASTMEDICALHX_GEN_ALL_CORE_FT
Logan Wolfe PAST MEDICAL HISTORY:  Anemia     Diabetes mellitus type II     DM2 (diabetes mellitus, type 2)     H/O: HTN (hypertension)     Headache     HTN (hypertension), benign     Hypercholesterolemia     Radius fracture left    Vertigo

## 2025-04-09 NOTE — ED ADULT TRIAGE NOTE - CHIEF COMPLAINT QUOTE
BIBEMS c/o abrasion to lip, b/l knee pain, and R shoulder pain s/p trip and fall after walking grandson to school. Denies LOC, dizziness, blood thinner use. PMH HTN, HLD, DM.

## 2025-04-09 NOTE — ED PROVIDER NOTE - RESPIRATORY, MLM
Chest symmetrical, non-labored breathing, breath sounds clear and equal bilaterally. No signs of bruising overlying skin of chest wall.

## 2025-04-09 NOTE — ED ADULT NURSE NOTE - OBJECTIVE STATEMENT
Patient alert and verbally responsive, came in due to having a fall while taking grand son to school. Noted with abrasion to lip, b/l knee pain, and R shoulder pain. Denies LOC, dizziness, blood thinner use. PMH HTN, HLD, DM.

## 2025-04-09 NOTE — CONSULT NOTE ADULT - SUBJECTIVE AND OBJECTIVE BOX
HPI  80yFemale w/ a c/o BL knee pain (R>L) s/p mechanical fall outside today. Able to bear weight in the RLE since the fall but with pain. Endorses headstrike but denies any LOC. Denies numbness/tingling in the RLE. Denies any other trauma/injuries at this time. At baseline, community ambulator w/ cane. Patient has a prior history of L Patella Fx in 2018, managed nonoperatively. Patient does not take any blood thinners at baseline.     ROS  Negative unless otherwise specified in HPI.    PAST MEDICAL & SURGICAL Hx  PAST MEDICAL & SURGICAL HISTORY:  Diabetes mellitus type II      HTN (hypertension), benign      Radius fracture  left      Hypercholesterolemia      Anemia      Headache      DM2 (diabetes mellitus, type 2)      H/O: HTN (hypertension)      Vertigo      History of cataract extraction  bilateral;  (Left 02/013 - right 03/3013)      History of wrist fracture  ORIF left wrist ; 2013      History of bunionectomy of left great toe      H/O tubal ligation  1983      Breast cyst, left  Excision of left breast cyst; 1964      S/P ORIF (open reduction internal fixation) fracture  left ankle; ~ 5-6 years ago      Radial fracture          MEDICATIONS  Home Medications:  acetaminophen 325 mg oral tablet: 2 tab(s) orally every 6 hours As needed Temp greater or equal to 38C (100.4F), Mild Pain (1 - 3) (18 Feb 2025 13:11)  insulin glargine 100 units/mL subcutaneous solution: 18 unit(s) subcutaneous once a day (at bedtime) (18 Feb 2025 13:10)  insulin lispro 100 units/mL injectable solution: 2 unit(s) injectable 3 times a day (before meals) (18 Feb 2025 13:10)  meclizine 25 mg oral tablet: 1 tab(s) orally 3 times a day as needed for  dizziness (12 Feb 2025 10:59)  senna leaf extract oral tablet: 2 tab(s) orally once a day (at bedtime) (18 Feb 2025 13:10)      ALLERGIES  No Known Allergies      FAMILY Hx  FAMILY HISTORY:  Family history of diabetes mellitus (Sibling)        SOCIAL Hx  Social History:      VITALS  Vital Signs Last 24 Hrs  T(C): 36.9 (09 Apr 2025 11:52), Max: 36.9 (09 Apr 2025 11:52)  T(F): 98.5 (09 Apr 2025 11:52), Max: 98.5 (09 Apr 2025 11:52)  HR: 76 (09 Apr 2025 11:52) (76 - 84)  BP: 131/79 (09 Apr 2025 11:52) (119/82 - 131/79)  BP(mean): --  RR: 18 (09 Apr 2025 11:52) (18 - 18)  SpO2: 98% (09 Apr 2025 11:52) (97% - 98%)    Parameters below as of 09 Apr 2025 11:52  Patient On (Oxygen Delivery Method): room air        PHYSICAL EXAM  Gen: Lying in bed, NAD  Resp: No increased WOB    RLE:  Skin intact, small superficial abrasion, +edema and +ecchymosis over L knee  +TTP over R knee, no palpable patellar defect, no TTP along remainder of extremity; compartments soft  Limited ROM of knee 2/2 pain  Difficulty with SLR  Motor: TA/EHL/GS/FHL intact  Sensory: DP/SP/Tib/Angella/Saph SILT  +DP pulse, WWP    LLE:    Skin intact, small superficial abrasion  +minimal TTP over L knee, no palpable patellar defect, no TTP along remainder of extremity; compartments soft  Full ROM of L Knee  Able to SLR  Motor: TA/EHL/GS/FHL intact  Sensory: DP/SP/Tib/Angella/Saph SILT  +DP pulse, WWP    Secondary survey:  Mild TTP to L ankle, XRs ordered, Mild R Shldr TTP but full AROM with ease, NVI; No pain along spine or other extremities, pelvis grossly stable, SILT and compartments soft throughout    IMAGING  XRs: R patella fx (personal read)    ASSESSMENT & PLAN  80yFemale w/ R patella fx s/p immobilization.  -WBAT RLE in a BJKI  -pain control  -ice/cold compress, elevation  -PT/OT  -no acute ortho surgery at this time  -FU XR R Ankle, if negative OK to DC  -DC on Aspirin for DVT Ppx for 1-2 weeks  -f/u outpt with Dr. Wilson within 1 week, call office for appt Unna Boot Text: An Unna boot was placed to help immobilize the limb and facilitate more rapid healing.  Patient was provided instructions on removing the Unna boot at home in 1 week

## 2025-04-09 NOTE — ED PROVIDER NOTE - PROGRESS NOTE DETAILS
MICHALE Guerrero: Workup reviewed - XRAY RT Shoulder w/ no obvious fracture/dislocation, XRAY RT Knee w/ comminuted, nondisplaced intra-articular fracture of the right patella. Results endorsed including unexpected incidental findings (copy of reports provided to patient). Discussed case w/ Orthopedics team - placed pt in knee immobilizer, recommending WBAT RLE and DC home w/ ASA and outpatient follow-up w/ Dr. Wilson. Shared Decision Making - Reassessment performed, pt able to ambulate prior to DC home, reports improvement of pain w/ medications. Patient is medically stable for discharge. Strict return precautions given, discussed red flag signs/symptoms. Patient to follow up with PMD, Ortho. Patient/parent displays understanding and agreeable with plan, comfortable with discharge plan home. Plan for discharge discussed with Ortho who agrees with disposition and discharge plan.

## 2025-04-09 NOTE — ED ADULT NURSE NOTE - NSFALLHARMRISKINTERV_ED_ALL_ED
Assistance OOB with selected safe patient handling equipment if applicable/Assistance with ambulation/Communicate risk of Fall with Harm to all staff, patient, and family/Monitor gait and stability/Provide visual cue: red socks, yellow wristband, yellow gown, etc/Reinforce activity limits and safety measures with patient and family/Bed in lowest position, wheels locked, appropriate side rails in place/Call bell, personal items and telephone in reach/Instruct patient to call for assistance before getting out of bed/chair/stretcher/Non-slip footwear applied when patient is off stretcher/[place] to call system/Physically safe environment - no spills, clutter or unnecessary equipment/Purposeful Proactive Rounding/Room/bathroom lighting operational, light cord in reach

## 2025-04-09 NOTE — ED PROVIDER NOTE - CLINICAL SUMMARY MEDICAL DECISION MAKING FREE TEXT BOX
80F w/ PMH DM2, HTN, HLD, Vertigo, Anemia who presents to ED w/ bilateral knee pain and right shoulder pain s/p mechanical fall x today. Pt states while walking her grandson to school, she tripped and fell onto the B/L knees, pt was able to ambulate s/p fall but reporting pain in B/L knees (worse RT > LT), ambulates w/ cane at baseline. Denies head injury/trauma, no LOC, denies preceding symptoms of lightheadedness/dizziness or CP. Denies fever, CP, SOB, abd pain, N/V/D, extremity weakness/numbness/tingling, dizziness, syncope, or headaches.     Patient currently afebrile, hemodynamically stable, spO2 98%. On PE - pt well-appearing, in no acute distress, heart w/ RRR, chest symmetrical, non-labored breathing, lungs clear bilaterally, abdomen soft/non-distended/non-tender to palpation, + Right knee w/ small superficial abrasion / swelling / ecchymosis and ttp overlying the anterior right knee, decreased ROM of right knee due to pain, no other bony ttp, Spine appears normal, no midline spinal tenderness.     Based on history and physical, differentials include but are not limited to knee contusion, abrasion, musculoskeletal strain/sprain, fracture/dislocation. Plan to assess patient for acute pathology as listed above with XRAY RT Shoulder, XRAY RT Ankle, XRAY B/L Knee. Will administer medications for symptomatic relief, follow-up on results, and reassess. Disposition pending workup/re-evaluation.    Workup reviewed - XRAY RT Shoulder w/ no obvious fracture/dislocation, XRAY RT Knee w/ comminuted, nondisplaced intra-articular fracture of the right patella. Discussed case w/ Orthopedics team - placed pt in knee immobilizer, recommending WBAT RLE and DC home w/ ASA and outpatient follow-up w/ Dr. Wilson. Will DC home w/ meds for pain relief and outpatient ortho follow-up.

## 2025-04-09 NOTE — ED PROVIDER NOTE - NSFOLLOWUPINSTRUCTIONS_ED_ALL_ED_FT
- Follow-up with Orthopedic Dr. Wilson within the next week.  - Follow-up with your Primary Care Physician within the next week.    Medications  - Aspirin 81 mg, 1 tablet, once daily, for 1-2 weeks, for deep vein thrombosis prophylaxis / prevention.  - Take Tylenol (Acetaminophen) 650 mg every 4-6 hours AND/OR Motrin (Ibuprofen) 600 mg every 6-8 hours as needed for pain/fever.      Advance activity as tolerated.  Continue all previously prescribed medications as directed unless otherwise instructed.  Follow up with your primary care physician in 48-72 hours- bring copies of your results.  Return to the ER for worsening or persistent symptoms, and/or ANY NEW OR CONCERNING SYMPTOMS such as fever, chest pain, shortness of breath, abdominal pain, or headaches. If you have issues obtaining follow up, please call: [phone] to obtain a doctor or specialist who takes your insurance in your area.  You may call [phone] to make an appointment with the internal medicine clinic.    A patellar fracture is a break in the kneecap (patella). The patella is located on the front of the knee. A patellar fracture may make it difficult to walk or do other activities.    What are the causes?  This condition may be caused by:    A fall.  A hard and direct hit to the knee.  A very hard and strong bending of the knee.  Overuse of the knee due to repeated movements.    What increases the risk?  The following factors may make you more likely to develop this condition:    Playing contact sports or motor sports, especially sports that involve a lot of jumping.  Having bone problems or diseases of the bone, such as a condition that weakens the bones (osteoporosis) or a bone tumor.  Having poor strength or flexibility.  Having metabolism disorders, hormone problems, or nutrition disorders.    What are the signs or symptoms?  Symptoms of this condition include:    A tender and swollen knee.  Pain when moving your knee, especially when straightening it.  Difficulty walking or using your knee to support body weight.  A misshapen knee. Your knee may look like a bone is out of place.  Hearing a noise, like a pop or a snap, in the front of your knee at the time of injury.  Having a feeling like a pop or a snap in the front of your knee at the time of injury.    How is this diagnosed?  This condition may be diagnosed based on your symptoms and medical history, a physical exam, and X-rays.    How is this treated?  Treatment for this condition depends on the type of fracture that you have.    If your patella is still in the right position after the fracture and you can still straighten your leg, you may need to wear a brace or cast for 4–6 weeks.  If your patella is broken into multiple pieces but you are able to straighten your leg, you can usually be treated with a brace or cast for 4–6 weeks. In rare cases, the patella may need to be removed before the cast is applied.  If you cannot straighten your leg after a patellar fracture, you will need to have surgery to hold the patella together until it heals. After surgery, a brace or cast will be applied for 4–6 weeks.  You may be prescribed medicine to help relieve pain.    Follow these instructions at home:    Medicines    Take over-the-counter and prescription medicines only as told by your health care provider.  Ask your health care provider if the medicine prescribed to you:    Requires you to avoid driving or using machinery.  Can cause constipation. You may need to take these actions to prevent or treat constipation:    Drink enough fluid to keep your urine pale yellow.  Take over-the-counter or prescription medicines.  Eat foods that are high in fiber, such as beans, whole grains, and fresh fruits and vegetables.  Limit foods that are high in fat and processed sugars, such as fried or sweet foods.    If you have a brace:    Wear it as told by your health care provider. Remove it only as told by your health care provider.  Loosen it if your toes tingle, become numb, or turn cold and blue.  Keep it clean and dry.  Check the skin around it every day. Tell your health care provider about any concerns.    If you have a cast:    Do not put pressure on any part of the cast until it is fully hardened. This may take several hours.  Do not stick anything inside it to scratch your skin. Doing that increases your risk of infection.  Check the skin around it every day. Tell your health care provider about any concerns.  You may put lotion on dry skin around the edges of the cast. Do not put lotion on the skin underneath the cast.  Keep it clean and dry.    Bathing    Do not take baths, swim, or use a hot tub until your health care provider approves. Ask your health care provider if you may take showers.  If the cast or brace is not waterproof:    Do not let it get wet.  Cover it with a watertight covering when you take a bath or a shower.    Managing pain, stiffness, and swelling     If directed, put ice on the injured area, To do this:    If you have a removable brace, remove it as told by your health care provider.  Put ice in a plastic bag.  Place a towel between your skin and the bag or between your cast and the bag.  Leave the ice on for 20 minutes, 2–3 times a day.  Move your toes and ankle often to reduce stiffness and swelling.  Raise (elevate) the injured area above the level of your heart while you are sitting or lying down.    Activity    Do not use the injured limb to support your body weight until your health care provider says that you can. Use crutches as told by your health care provider.  Return to your normal activities as told by your health care provider. Ask your health care provider what activities are safe for you.  Do exercises as told by your health care provider.  Ask your health care provider when it is safe to drive if you have a brace or cast on your leg.        General instructions    Do not use any products that contain nicotine or tobacco, such as cigarettes, e-cigarettes, and chewing tobacco. These can delay bone healing. If you need help quitting, ask your health care provider.  Keep all follow-up visits as told by your health care provider. This is important.    Contact a health care provider if you have:  A fever.  Symptoms that get worse or do not get better after 2 weeks of treatment.  Redness, more swelling, or increasing pain in your knee.    Get help right away if you have:  Severe pain that does not go away.  Blue or gray skin below the fracture site, or if your toes on the affected side look blue or gray.  Numbness or loss of feeling below the fracture site.    Summary  A patellar fracture is a break in the kneecap.  Depending on the type of fracture, you may need to wear a brace or a cast.  You will need to have surgery if you are unable to straighten your leg.  Return to your normal activities as told by your health care provider. Ask your health care provider what activities are safe for you.    ADDITIONAL NOTES AND INSTRUCTIONS    Please follow up with your Primary MD in 24-48 hr.  Seek immediate medical care for any new/worsening signs or symptoms.

## 2025-04-09 NOTE — ED PROVIDER NOTE - LOCATION
+ Left anterior knee w/ small abrasion, otherwise no obvious deformity, no swelling/bruising/ttp./knee

## 2025-04-09 NOTE — ED PROVIDER NOTE - PATIENT PORTAL LINK FT
You can access the FollowMyHealth Patient Portal offered by [facility] by registering at the following website: [email or web address]. By joining [employer]’s FollowMyHealth portal, you will also be able to view your health information using other applications (apps) compatible with our system.

## 2025-04-09 NOTE — ED ADULT NURSE REASSESSMENT NOTE - NS ED NURSE REASSESS COMMENT FT1
Discharge instructions provided and verbalizes understanding of medication regimen and follow up care. Educational material provided. Denies any pain at this time. verified address and SW set up transport

## 2025-07-18 NOTE — H&P ADULT - NSHPPHYSICALEXAM_GEN_ALL_CORE
[FreeTextEntry1] : #favor epidermal cysts, b/l axilla - ddx includes hidradenitis suppurativa - education, counseling, benign however can rupture, discussed monitoring vs excision, defer excision for now - Start benzoyl peroxide 4-5% wash daily. Sed, can bleach fabrics and hair - Start clindamycin 1% solution daily as needed for bumps. SED. To use after bp wash. - pt would like imaging - f/u ultrasound axilla  RTC 2-3 months  PHYSICAL EXAM:      Constitutional: NAD, well-groomed, well-developed  HEENT: EOMI, Normal Hearing  Neck: No LAD, No JVD  Back: Normal spine flexure, No CVA tenderness  Respiratory: CTAB  Cardiovascular: S1 and S2, RRR, no M/G/R  Gastrointestinal: BS+, soft, NT/ND  Extremities: No peripheral edema  Vascular: 2+ peripheral pulses  Neurological: A/O x 3, no focal deficits  Psychiatric: Normal mood, normal affect  Musculoskeletal: 5/5 strength b/l upper and lower extremities, save for left leg 2/2 pain; bulky chu left knee immobilizer  Skin: No rashes